# Patient Record
Sex: MALE | Race: WHITE | NOT HISPANIC OR LATINO | URBAN - METROPOLITAN AREA
[De-identification: names, ages, dates, MRNs, and addresses within clinical notes are randomized per-mention and may not be internally consistent; named-entity substitution may affect disease eponyms.]

---

## 2017-01-09 ENCOUNTER — EMERGENCY (EMERGENCY)
Facility: HOSPITAL | Age: 32
LOS: 1 days | End: 2017-01-09
Payer: SELF-PAY

## 2017-01-09 PROCEDURE — 99285 EMERGENCY DEPT VISIT HI MDM: CPT

## 2017-01-09 PROCEDURE — 74176 CT ABD & PELVIS W/O CONTRAST: CPT | Mod: 26

## 2017-05-26 ENCOUNTER — EMERGENCY (EMERGENCY)
Facility: HOSPITAL | Age: 32
LOS: 1 days | End: 2017-05-26
Payer: SELF-PAY

## 2017-05-26 PROCEDURE — 99284 EMERGENCY DEPT VISIT MOD MDM: CPT | Mod: 25

## 2017-05-26 PROCEDURE — 74176 CT ABD & PELVIS W/O CONTRAST: CPT | Mod: 26

## 2017-05-26 PROCEDURE — 99053 MED SERV 10PM-8AM 24 HR FAC: CPT

## 2019-10-21 ENCOUNTER — EMERGENCY (EMERGENCY)
Facility: HOSPITAL | Age: 34
LOS: 1 days | End: 2019-10-21
Admitting: EMERGENCY MEDICINE
Payer: COMMERCIAL

## 2019-10-21 PROCEDURE — 99284 EMERGENCY DEPT VISIT MOD MDM: CPT

## 2019-10-21 PROCEDURE — 74177 CT ABD & PELVIS W/CONTRAST: CPT | Mod: 26

## 2019-12-30 ENCOUNTER — OUTPATIENT (OUTPATIENT)
Dept: OUTPATIENT SERVICES | Facility: HOSPITAL | Age: 34
LOS: 1 days | End: 2019-12-30

## 2020-08-24 ENCOUNTER — INPATIENT (INPATIENT)
Facility: HOSPITAL | Age: 35
LOS: 0 days | Discharge: ANOTHER HOSPITAL | End: 2020-08-24
Attending: INTERNAL MEDICINE | Admitting: INTERNAL MEDICINE
Payer: COMMERCIAL

## 2020-08-24 ENCOUNTER — INPATIENT (INPATIENT)
Facility: HOSPITAL | Age: 35
LOS: 9 days | Discharge: HOME | DRG: 235 | End: 2020-09-03
Attending: THORACIC SURGERY (CARDIOTHORACIC VASCULAR SURGERY) | Admitting: THORACIC SURGERY (CARDIOTHORACIC VASCULAR SURGERY)
Payer: SELF-PAY

## 2020-08-24 VITALS
SYSTOLIC BLOOD PRESSURE: 174 MMHG | HEIGHT: 70 IN | DIASTOLIC BLOOD PRESSURE: 80 MMHG | RESPIRATION RATE: 15 BRPM | TEMPERATURE: 99 F | WEIGHT: 305.78 LBS | OXYGEN SATURATION: 94 % | HEART RATE: 97 BPM

## 2020-08-24 DIAGNOSIS — I25.10 ATHEROSCLEROTIC HEART DISEASE OF NATIVE CORONARY ARTERY WITHOUT ANGINA PECTORIS: ICD-10-CM

## 2020-08-24 LAB
A1C WITH ESTIMATED AVERAGE GLUCOSE RESULT: 5.7 % — HIGH (ref 4–5.6)
ALBUMIN SERPL ELPH-MCNC: 4.1 G/DL — SIGNIFICANT CHANGE UP (ref 3.3–5.2)
ALP SERPL-CCNC: 74 U/L — SIGNIFICANT CHANGE UP (ref 40–120)
ALT FLD-CCNC: 29 U/L — SIGNIFICANT CHANGE UP
ANION GAP SERPL CALC-SCNC: 16 MMOL/L — SIGNIFICANT CHANGE UP (ref 5–17)
APPEARANCE UR: CLEAR — SIGNIFICANT CHANGE UP
APTT BLD: 80.8 SEC — HIGH (ref 27.5–35.5)
AST SERPL-CCNC: 44 U/L — HIGH
BASOPHILS # BLD AUTO: 0.06 K/UL — SIGNIFICANT CHANGE UP (ref 0–0.2)
BASOPHILS NFR BLD AUTO: 0.4 % — SIGNIFICANT CHANGE UP (ref 0–2)
BILIRUB SERPL-MCNC: 0.6 MG/DL — SIGNIFICANT CHANGE UP (ref 0.4–2)
BILIRUB UR-MCNC: NEGATIVE — SIGNIFICANT CHANGE UP
BLD GP AB SCN SERPL QL: SIGNIFICANT CHANGE UP
BUN SERPL-MCNC: 12 MG/DL — SIGNIFICANT CHANGE UP (ref 8–20)
CALCIUM SERPL-MCNC: 9.2 MG/DL — SIGNIFICANT CHANGE UP (ref 8.6–10.2)
CHLORIDE SERPL-SCNC: 97 MMOL/L — LOW (ref 98–107)
CK SERPL-CCNC: 104 U/L — SIGNIFICANT CHANGE UP (ref 30–200)
CO2 SERPL-SCNC: 23 MMOL/L — SIGNIFICANT CHANGE UP (ref 22–29)
COLOR SPEC: YELLOW — SIGNIFICANT CHANGE UP
CREAT SERPL-MCNC: 0.96 MG/DL — SIGNIFICANT CHANGE UP (ref 0.5–1.3)
DIFF PNL FLD: NEGATIVE — SIGNIFICANT CHANGE UP
EOSINOPHIL # BLD AUTO: 0.24 K/UL — SIGNIFICANT CHANGE UP (ref 0–0.5)
EOSINOPHIL NFR BLD AUTO: 1.7 % — SIGNIFICANT CHANGE UP (ref 0–6)
EPI CELLS # UR: SIGNIFICANT CHANGE UP
ESTIMATED AVERAGE GLUCOSE: 117 MG/DL — HIGH (ref 68–114)
GLUCOSE SERPL-MCNC: 107 MG/DL — HIGH (ref 70–99)
GLUCOSE UR QL: NEGATIVE MG/DL — SIGNIFICANT CHANGE UP
HCT VFR BLD CALC: 44.5 % — SIGNIFICANT CHANGE UP (ref 39–50)
HGB BLD-MCNC: 14.5 G/DL — SIGNIFICANT CHANGE UP (ref 13–17)
IMM GRANULOCYTES NFR BLD AUTO: 2 % — HIGH (ref 0–1.5)
INR BLD: 1.23 RATIO — HIGH (ref 0.88–1.16)
KETONES UR-MCNC: ABNORMAL
LEUKOCYTE ESTERASE UR-ACNC: NEGATIVE — SIGNIFICANT CHANGE UP
LYMPHOCYTES # BLD AUTO: 14.1 % — SIGNIFICANT CHANGE UP (ref 13–44)
LYMPHOCYTES # BLD AUTO: 2.01 K/UL — SIGNIFICANT CHANGE UP (ref 1–3.3)
MCHC RBC-ENTMCNC: 27.4 PG — SIGNIFICANT CHANGE UP (ref 27–34)
MCHC RBC-ENTMCNC: 32.6 GM/DL — SIGNIFICANT CHANGE UP (ref 32–36)
MCV RBC AUTO: 84.1 FL — SIGNIFICANT CHANGE UP (ref 80–100)
MONOCYTES # BLD AUTO: 0.79 K/UL — SIGNIFICANT CHANGE UP (ref 0–0.9)
MONOCYTES NFR BLD AUTO: 5.6 % — SIGNIFICANT CHANGE UP (ref 2–14)
NEUTROPHILS # BLD AUTO: 10.83 K/UL — HIGH (ref 1.8–7.4)
NEUTROPHILS NFR BLD AUTO: 76.2 % — SIGNIFICANT CHANGE UP (ref 43–77)
NITRITE UR-MCNC: NEGATIVE — SIGNIFICANT CHANGE UP
NT-PROBNP SERPL-SCNC: 1384 PG/ML — HIGH (ref 0–300)
PA ADP PRP-ACNC: 15 PRU — LOW (ref 180–376)
PH UR: 6.5 — SIGNIFICANT CHANGE UP (ref 5–8)
PLATELET # BLD AUTO: 338 K/UL — SIGNIFICANT CHANGE UP (ref 150–400)
POTASSIUM SERPL-MCNC: 3.5 MMOL/L — SIGNIFICANT CHANGE UP (ref 3.5–5.3)
POTASSIUM SERPL-SCNC: 3.5 MMOL/L — SIGNIFICANT CHANGE UP (ref 3.5–5.3)
PROT SERPL-MCNC: 7.4 G/DL — SIGNIFICANT CHANGE UP (ref 6.6–8.7)
PROT UR-MCNC: 30 MG/DL
PROTHROM AB SERPL-ACNC: 14.1 SEC — HIGH (ref 10.6–13.6)
RBC # BLD: 5.29 M/UL — SIGNIFICANT CHANGE UP (ref 4.2–5.8)
RBC # FLD: 14.8 % — HIGH (ref 10.3–14.5)
RBC CASTS # UR COMP ASSIST: NEGATIVE /HPF — SIGNIFICANT CHANGE UP (ref 0–4)
SODIUM SERPL-SCNC: 136 MMOL/L — SIGNIFICANT CHANGE UP (ref 135–145)
SP GR SPEC: 1 — LOW (ref 1.01–1.02)
TSH SERPL-MCNC: 1.82 UIU/ML — SIGNIFICANT CHANGE UP (ref 0.27–4.2)
UROBILINOGEN FLD QL: NEGATIVE MG/DL — SIGNIFICANT CHANGE UP
WBC # BLD: 14.21 K/UL — HIGH (ref 3.8–10.5)
WBC # FLD AUTO: 14.21 K/UL — HIGH (ref 3.8–10.5)
WBC UR QL: NEGATIVE — SIGNIFICANT CHANGE UP

## 2020-08-24 PROCEDURE — 93010 ELECTROCARDIOGRAM REPORT: CPT | Mod: 76

## 2020-08-24 PROCEDURE — 93458 L HRT ARTERY/VENTRICLE ANGIO: CPT | Mod: 26

## 2020-08-24 PROCEDURE — 99223 1ST HOSP IP/OBS HIGH 75: CPT

## 2020-08-24 PROCEDURE — 93010 ELECTROCARDIOGRAM REPORT: CPT

## 2020-08-24 PROCEDURE — 93308 TTE F-UP OR LMTD: CPT | Mod: 26

## 2020-08-24 PROCEDURE — 71045 X-RAY EXAM CHEST 1 VIEW: CPT | Mod: 26

## 2020-08-24 PROCEDURE — 99291 CRITICAL CARE FIRST HOUR: CPT

## 2020-08-24 PROCEDURE — 33967 INSERT I-AORT PERCUT DEVICE: CPT

## 2020-08-24 PROCEDURE — 99255 IP/OBS CONSLTJ NEW/EST HI 80: CPT | Mod: 25

## 2020-08-24 PROCEDURE — 93880 EXTRACRANIAL BILAT STUDY: CPT | Mod: 26

## 2020-08-24 RX ORDER — ONDANSETRON 8 MG/1
4 TABLET, FILM COATED ORAL ONCE
Refills: 0 | Status: COMPLETED | OUTPATIENT
Start: 2020-08-24 | End: 2020-08-24

## 2020-08-24 RX ORDER — SODIUM CHLORIDE 9 MG/ML
3 INJECTION INTRAMUSCULAR; INTRAVENOUS; SUBCUTANEOUS EVERY 8 HOURS
Refills: 0 | Status: DISCONTINUED | OUTPATIENT
Start: 2020-08-24 | End: 2020-08-28

## 2020-08-24 RX ORDER — FUROSEMIDE 40 MG
40 TABLET ORAL ONCE
Refills: 0 | Status: COMPLETED | OUTPATIENT
Start: 2020-08-24 | End: 2020-08-24

## 2020-08-24 RX ORDER — HEPARIN SODIUM 5000 [USP'U]/ML
1000 INJECTION INTRAVENOUS; SUBCUTANEOUS
Qty: 25000 | Refills: 0 | Status: DISCONTINUED | OUTPATIENT
Start: 2020-08-24 | End: 2020-08-25

## 2020-08-24 RX ORDER — ASPIRIN/CALCIUM CARB/MAGNESIUM 324 MG
81 TABLET ORAL DAILY
Refills: 0 | Status: DISCONTINUED | OUTPATIENT
Start: 2020-08-24 | End: 2020-08-28

## 2020-08-24 RX ORDER — POTASSIUM CHLORIDE 20 MEQ
40 PACKET (EA) ORAL ONCE
Refills: 0 | Status: COMPLETED | OUTPATIENT
Start: 2020-08-24 | End: 2020-08-24

## 2020-08-24 RX ORDER — ATORVASTATIN CALCIUM 80 MG/1
40 TABLET, FILM COATED ORAL AT BEDTIME
Refills: 0 | Status: DISCONTINUED | OUTPATIENT
Start: 2020-08-24 | End: 2020-08-28

## 2020-08-24 RX ORDER — DOBUTAMINE HCL 250MG/20ML
2.5 VIAL (ML) INTRAVENOUS
Qty: 500 | Refills: 0 | Status: DISCONTINUED | OUTPATIENT
Start: 2020-08-24 | End: 2020-08-24

## 2020-08-24 RX ORDER — MORPHINE SULFATE 50 MG/1
2 CAPSULE, EXTENDED RELEASE ORAL EVERY 4 HOURS
Refills: 0 | Status: DISCONTINUED | OUTPATIENT
Start: 2020-08-24 | End: 2020-08-28

## 2020-08-24 RX ORDER — HYDRALAZINE HCL 50 MG
10 TABLET ORAL ONCE
Refills: 0 | Status: COMPLETED | OUTPATIENT
Start: 2020-08-24 | End: 2020-08-24

## 2020-08-24 RX ORDER — ACETAMINOPHEN 500 MG
1000 TABLET ORAL ONCE
Refills: 0 | Status: COMPLETED | OUTPATIENT
Start: 2020-08-24 | End: 2020-08-24

## 2020-08-24 RX ORDER — METOPROLOL TARTRATE 50 MG
25 TABLET ORAL
Refills: 0 | Status: DISCONTINUED | OUTPATIENT
Start: 2020-08-24 | End: 2020-08-25

## 2020-08-24 RX ADMIN — Medication 25 MILLIGRAM(S): at 23:04

## 2020-08-24 RX ADMIN — MORPHINE SULFATE 2 MILLIGRAM(S): 50 CAPSULE, EXTENDED RELEASE ORAL at 21:30

## 2020-08-24 RX ADMIN — SODIUM CHLORIDE 3 MILLILITER(S): 9 INJECTION INTRAMUSCULAR; INTRAVENOUS; SUBCUTANEOUS at 21:47

## 2020-08-24 RX ADMIN — Medication 400 MILLIGRAM(S): at 23:05

## 2020-08-24 RX ADMIN — ONDANSETRON 4 MILLIGRAM(S): 8 TABLET, FILM COATED ORAL at 21:45

## 2020-08-24 RX ADMIN — Medication 40 MILLIGRAM(S): at 23:04

## 2020-08-24 RX ADMIN — HEPARIN SODIUM 9.5 UNIT(S)/HR: 5000 INJECTION INTRAVENOUS; SUBCUTANEOUS at 22:45

## 2020-08-24 RX ADMIN — MORPHINE SULFATE 2 MILLIGRAM(S): 50 CAPSULE, EXTENDED RELEASE ORAL at 20:45

## 2020-08-24 RX ADMIN — Medication 40 MILLIEQUIVALENT(S): at 23:07

## 2020-08-24 RX ADMIN — Medication 10 MILLIGRAM(S): at 21:45

## 2020-08-25 DIAGNOSIS — E66.9 OBESITY, UNSPECIFIED: ICD-10-CM

## 2020-08-25 DIAGNOSIS — K46.9 UNSPECIFIED ABDOMINAL HERNIA WITHOUT OBSTRUCTION OR GANGRENE: Chronic | ICD-10-CM

## 2020-08-25 DIAGNOSIS — Z87.891 PERSONAL HISTORY OF NICOTINE DEPENDENCE: ICD-10-CM

## 2020-08-25 DIAGNOSIS — K21.9 GASTRO-ESOPHAGEAL REFLUX DISEASE WITHOUT ESOPHAGITIS: ICD-10-CM

## 2020-08-25 DIAGNOSIS — I50.21 ACUTE SYSTOLIC (CONGESTIVE) HEART FAILURE: ICD-10-CM

## 2020-08-25 DIAGNOSIS — Z29.9 ENCOUNTER FOR PROPHYLACTIC MEASURES, UNSPECIFIED: ICD-10-CM

## 2020-08-25 LAB
ABO RH CONFIRMATION: SIGNIFICANT CHANGE UP
ALBUMIN SERPL ELPH-MCNC: 3.9 G/DL — SIGNIFICANT CHANGE UP (ref 3.3–5.2)
ALBUMIN SERPL ELPH-MCNC: 4.1 G/DL — SIGNIFICANT CHANGE UP (ref 3.3–5.2)
ALP SERPL-CCNC: 64 U/L — SIGNIFICANT CHANGE UP (ref 40–120)
ALP SERPL-CCNC: 66 U/L — SIGNIFICANT CHANGE UP (ref 40–120)
ALT FLD-CCNC: 24 U/L — SIGNIFICANT CHANGE UP
ALT FLD-CCNC: 28 U/L — SIGNIFICANT CHANGE UP
ANION GAP SERPL CALC-SCNC: 17 MMOL/L — SIGNIFICANT CHANGE UP (ref 5–17)
ANION GAP SERPL CALC-SCNC: SIGNIFICANT CHANGE UP MMOL/L (ref 5–17)
APTT BLD: 38.2 SEC — HIGH (ref 27.5–35.5)
APTT BLD: 38.2 SEC — HIGH (ref 27.5–35.5)
APTT BLD: 43.8 SEC — HIGH (ref 27.5–35.5)
AST SERPL-CCNC: 27 U/L — SIGNIFICANT CHANGE UP
AST SERPL-CCNC: 34 U/L — SIGNIFICANT CHANGE UP
BASOPHILS # BLD AUTO: 0.06 K/UL — SIGNIFICANT CHANGE UP (ref 0–0.2)
BASOPHILS NFR BLD AUTO: 0.5 % — SIGNIFICANT CHANGE UP (ref 0–2)
BILIRUB SERPL-MCNC: 0.4 MG/DL — SIGNIFICANT CHANGE UP (ref 0.4–2)
BILIRUB SERPL-MCNC: 0.6 MG/DL — SIGNIFICANT CHANGE UP (ref 0.4–2)
BUN SERPL-MCNC: 16 MG/DL — SIGNIFICANT CHANGE UP (ref 8–20)
BUN SERPL-MCNC: 20 MG/DL — SIGNIFICANT CHANGE UP (ref 8–20)
CALCIUM SERPL-MCNC: 9.1 MG/DL — SIGNIFICANT CHANGE UP (ref 8.6–10.2)
CALCIUM SERPL-MCNC: 9.2 MG/DL — SIGNIFICANT CHANGE UP (ref 8.6–10.2)
CHLORIDE SERPL-SCNC: 96 MMOL/L — LOW (ref 98–107)
CHLORIDE SERPL-SCNC: 99 MMOL/L — SIGNIFICANT CHANGE UP (ref 98–107)
CO2 SERPL-SCNC: 24 MMOL/L — SIGNIFICANT CHANGE UP (ref 22–29)
CO2 SERPL-SCNC: 24 MMOL/L — SIGNIFICANT CHANGE UP (ref 22–29)
CREAT SERPL-MCNC: 0.93 MG/DL — SIGNIFICANT CHANGE UP (ref 0.5–1.3)
CREAT SERPL-MCNC: 0.95 MG/DL — SIGNIFICANT CHANGE UP (ref 0.5–1.3)
EOSINOPHIL # BLD AUTO: 0.14 K/UL — SIGNIFICANT CHANGE UP (ref 0–0.5)
EOSINOPHIL NFR BLD AUTO: 1.1 % — SIGNIFICANT CHANGE UP (ref 0–6)
GLUCOSE SERPL-MCNC: 116 MG/DL — HIGH (ref 70–99)
GLUCOSE SERPL-MCNC: 118 MG/DL — HIGH (ref 70–99)
HCT VFR BLD CALC: 42.5 % — SIGNIFICANT CHANGE UP (ref 39–50)
HGB BLD-MCNC: 13.5 G/DL — SIGNIFICANT CHANGE UP (ref 13–17)
IMM GRANULOCYTES NFR BLD AUTO: 1.8 % — HIGH (ref 0–1.5)
LYMPHOCYTES # BLD AUTO: 1.76 K/UL — SIGNIFICANT CHANGE UP (ref 1–3.3)
LYMPHOCYTES # BLD AUTO: 14.3 % — SIGNIFICANT CHANGE UP (ref 13–44)
MAGNESIUM SERPL-MCNC: 2.1 MG/DL — SIGNIFICANT CHANGE UP (ref 1.6–2.6)
MCHC RBC-ENTMCNC: 27.3 PG — SIGNIFICANT CHANGE UP (ref 27–34)
MCHC RBC-ENTMCNC: 31.8 GM/DL — LOW (ref 32–36)
MCV RBC AUTO: 85.9 FL — SIGNIFICANT CHANGE UP (ref 80–100)
MONOCYTES # BLD AUTO: 0.8 K/UL — SIGNIFICANT CHANGE UP (ref 0–0.9)
MONOCYTES NFR BLD AUTO: 6.5 % — SIGNIFICANT CHANGE UP (ref 2–14)
MRSA PCR RESULT.: SIGNIFICANT CHANGE UP
NEUTROPHILS # BLD AUTO: 9.31 K/UL — HIGH (ref 1.8–7.4)
NEUTROPHILS NFR BLD AUTO: 75.8 % — SIGNIFICANT CHANGE UP (ref 43–77)
PLATELET # BLD AUTO: 342 K/UL — SIGNIFICANT CHANGE UP (ref 150–400)
POTASSIUM SERPL-MCNC: 4.2 MMOL/L — SIGNIFICANT CHANGE UP (ref 3.5–5.3)
POTASSIUM SERPL-MCNC: 4.2 MMOL/L — SIGNIFICANT CHANGE UP (ref 3.5–5.3)
POTASSIUM SERPL-SCNC: 4.2 MMOL/L — SIGNIFICANT CHANGE UP (ref 3.5–5.3)
POTASSIUM SERPL-SCNC: 4.2 MMOL/L — SIGNIFICANT CHANGE UP (ref 3.5–5.3)
PROT SERPL-MCNC: 7.1 G/DL — SIGNIFICANT CHANGE UP (ref 6.6–8.7)
PROT SERPL-MCNC: 7.4 G/DL — SIGNIFICANT CHANGE UP (ref 6.6–8.7)
RBC # BLD: 4.95 M/UL — SIGNIFICANT CHANGE UP (ref 4.2–5.8)
RBC # FLD: 15.1 % — HIGH (ref 10.3–14.5)
S AUREUS DNA NOSE QL NAA+PROBE: SIGNIFICANT CHANGE UP
SARS-COV-2 IGG SERPL QL IA: NEGATIVE — SIGNIFICANT CHANGE UP
SARS-COV-2 IGM SERPL IA-ACNC: 0.12 INDEX — SIGNIFICANT CHANGE UP
SODIUM SERPL-SCNC: 134 MMOL/L — LOW (ref 135–145)
SODIUM SERPL-SCNC: 137 MMOL/L — SIGNIFICANT CHANGE UP (ref 135–145)
T4 FREE SERPL-MCNC: 1.3 NG/DL — SIGNIFICANT CHANGE UP (ref 0.9–1.8)
TROPONIN T SERPL-MCNC: 1.65 NG/ML — HIGH (ref 0–0.06)
WBC # BLD: 12.29 K/UL — HIGH (ref 3.8–10.5)
WBC # FLD AUTO: 12.29 K/UL — HIGH (ref 3.8–10.5)

## 2020-08-25 PROCEDURE — 93010 ELECTROCARDIOGRAM REPORT: CPT | Mod: 76

## 2020-08-25 PROCEDURE — 93306 TTE W/DOPPLER COMPLETE: CPT | Mod: 26

## 2020-08-25 PROCEDURE — 71045 X-RAY EXAM CHEST 1 VIEW: CPT | Mod: 26,76

## 2020-08-25 PROCEDURE — 99222 1ST HOSP IP/OBS MODERATE 55: CPT

## 2020-08-25 RX ORDER — ACETAMINOPHEN 500 MG
1000 TABLET ORAL ONCE
Refills: 0 | Status: COMPLETED | OUTPATIENT
Start: 2020-08-25 | End: 2020-08-25

## 2020-08-25 RX ORDER — HEPARIN SODIUM 5000 [USP'U]/ML
1400 INJECTION INTRAVENOUS; SUBCUTANEOUS
Qty: 25000 | Refills: 0 | Status: DISCONTINUED | OUTPATIENT
Start: 2020-08-25 | End: 2020-08-26

## 2020-08-25 RX ORDER — METOPROLOL TARTRATE 50 MG
50 TABLET ORAL EVERY 12 HOURS
Refills: 0 | Status: DISCONTINUED | OUTPATIENT
Start: 2020-08-25 | End: 2020-08-26

## 2020-08-25 RX ORDER — METOPROLOL TARTRATE 50 MG
25 TABLET ORAL
Refills: 0 | Status: DISCONTINUED | OUTPATIENT
Start: 2020-08-25 | End: 2020-08-25

## 2020-08-25 RX ORDER — ALPRAZOLAM 0.25 MG
0.25 TABLET ORAL ONCE
Refills: 0 | Status: DISCONTINUED | OUTPATIENT
Start: 2020-08-25 | End: 2020-08-25

## 2020-08-25 RX ORDER — POTASSIUM CHLORIDE 20 MEQ
40 PACKET (EA) ORAL ONCE
Refills: 0 | Status: COMPLETED | OUTPATIENT
Start: 2020-08-25 | End: 2020-08-25

## 2020-08-25 RX ORDER — CHLORHEXIDINE GLUCONATE 213 G/1000ML
1 SOLUTION TOPICAL ONCE
Refills: 0 | Status: COMPLETED | OUTPATIENT
Start: 2020-08-25 | End: 2020-08-25

## 2020-08-25 RX ORDER — NITROGLYCERIN 6.5 MG
0.4 CAPSULE, EXTENDED RELEASE ORAL ONCE
Refills: 0 | Status: COMPLETED | OUTPATIENT
Start: 2020-08-25 | End: 2020-08-25

## 2020-08-25 RX ORDER — CEFUROXIME AXETIL 250 MG
1500 TABLET ORAL ONCE
Refills: 0 | Status: DISCONTINUED | OUTPATIENT
Start: 2020-08-25 | End: 2020-08-28

## 2020-08-25 RX ORDER — VANCOMYCIN HCL 1 G
1500 VIAL (EA) INTRAVENOUS ONCE
Refills: 0 | Status: DISCONTINUED | OUTPATIENT
Start: 2020-08-25 | End: 2020-08-28

## 2020-08-25 RX ORDER — HEPARIN SODIUM 5000 [USP'U]/ML
1200 INJECTION INTRAVENOUS; SUBCUTANEOUS
Qty: 25000 | Refills: 0 | Status: DISCONTINUED | OUTPATIENT
Start: 2020-08-25 | End: 2020-08-25

## 2020-08-25 RX ORDER — METOPROLOL TARTRATE 50 MG
25 TABLET ORAL ONCE
Refills: 0 | Status: COMPLETED | OUTPATIENT
Start: 2020-08-25 | End: 2020-08-25

## 2020-08-25 RX ORDER — METOPROLOL TARTRATE 50 MG
5 TABLET ORAL ONCE
Refills: 0 | Status: COMPLETED | OUTPATIENT
Start: 2020-08-25 | End: 2020-08-25

## 2020-08-25 RX ORDER — ALPRAZOLAM 0.25 MG
0.25 TABLET ORAL EVERY 8 HOURS
Refills: 0 | Status: DISCONTINUED | OUTPATIENT
Start: 2020-08-25 | End: 2020-08-27

## 2020-08-25 RX ORDER — METOPROLOL TARTRATE 50 MG
25 TABLET ORAL THREE TIMES A DAY
Refills: 0 | Status: DISCONTINUED | OUTPATIENT
Start: 2020-08-25 | End: 2020-08-25

## 2020-08-25 RX ORDER — NITROGLYCERIN 6.5 MG
5 CAPSULE, EXTENDED RELEASE ORAL
Qty: 50 | Refills: 0 | Status: DISCONTINUED | OUTPATIENT
Start: 2020-08-25 | End: 2020-08-27

## 2020-08-25 RX ADMIN — SODIUM CHLORIDE 3 MILLILITER(S): 9 INJECTION INTRAMUSCULAR; INTRAVENOUS; SUBCUTANEOUS at 21:41

## 2020-08-25 RX ADMIN — Medication 25 MILLIGRAM(S): at 05:04

## 2020-08-25 RX ADMIN — CHLORHEXIDINE GLUCONATE 1 APPLICATION(S): 213 SOLUTION TOPICAL at 22:05

## 2020-08-25 RX ADMIN — Medication 1000 MILLIGRAM(S): at 15:30

## 2020-08-25 RX ADMIN — SODIUM CHLORIDE 3 MILLILITER(S): 9 INJECTION INTRAMUSCULAR; INTRAVENOUS; SUBCUTANEOUS at 05:05

## 2020-08-25 RX ADMIN — Medication 0.4 MILLIGRAM(S): at 21:39

## 2020-08-25 RX ADMIN — MORPHINE SULFATE 2 MILLIGRAM(S): 50 CAPSULE, EXTENDED RELEASE ORAL at 18:43

## 2020-08-25 RX ADMIN — Medication 0.25 MILLIGRAM(S): at 21:30

## 2020-08-25 RX ADMIN — MORPHINE SULFATE 2 MILLIGRAM(S): 50 CAPSULE, EXTENDED RELEASE ORAL at 13:22

## 2020-08-25 RX ADMIN — Medication 5 MILLIGRAM(S): at 09:31

## 2020-08-25 RX ADMIN — Medication 1000 MILLIGRAM(S): at 00:00

## 2020-08-25 RX ADMIN — Medication 50 MILLIGRAM(S): at 17:08

## 2020-08-25 RX ADMIN — Medication 400 MILLIGRAM(S): at 08:00

## 2020-08-25 RX ADMIN — Medication 400 MILLIGRAM(S): at 15:05

## 2020-08-25 RX ADMIN — Medication 0.25 MILLIGRAM(S): at 02:33

## 2020-08-25 RX ADMIN — MORPHINE SULFATE 2 MILLIGRAM(S): 50 CAPSULE, EXTENDED RELEASE ORAL at 13:07

## 2020-08-25 RX ADMIN — Medication 0.4 MILLIGRAM(S): at 06:15

## 2020-08-25 RX ADMIN — Medication 40 MILLIEQUIVALENT(S): at 05:04

## 2020-08-25 RX ADMIN — Medication 81 MILLIGRAM(S): at 12:31

## 2020-08-25 RX ADMIN — ATORVASTATIN CALCIUM 40 MILLIGRAM(S): 80 TABLET, FILM COATED ORAL at 21:39

## 2020-08-25 RX ADMIN — MORPHINE SULFATE 2 MILLIGRAM(S): 50 CAPSULE, EXTENDED RELEASE ORAL at 05:37

## 2020-08-25 RX ADMIN — MORPHINE SULFATE 2 MILLIGRAM(S): 50 CAPSULE, EXTENDED RELEASE ORAL at 18:28

## 2020-08-25 RX ADMIN — HEPARIN SODIUM 14 UNIT(S)/HR: 5000 INJECTION INTRAVENOUS; SUBCUTANEOUS at 21:00

## 2020-08-25 RX ADMIN — Medication 25 MILLIGRAM(S): at 12:31

## 2020-08-25 RX ADMIN — SODIUM CHLORIDE 3 MILLILITER(S): 9 INJECTION INTRAMUSCULAR; INTRAVENOUS; SUBCUTANEOUS at 14:18

## 2020-08-25 RX ADMIN — Medication 1000 MILLIGRAM(S): at 08:30

## 2020-08-25 NOTE — H&P ADULT - ASSESSMENT
35 year old male, PMH of GERD, HTN, active smoker for 12 years (1/2 PPD), family history of MI (father at 34yo), [place] [facility] [place] presenting with shortness of breath for two days with frothy sputum. Patient had chest pain, with arm pain on his left. CXR showed what looked like pulmonary edema. Patient was given lopressor, lasix, and was started on nitro gtt. ST depressions noted on EKG. Patient taken to the Cath lab and was found to have EF of 30-40%, proximal LAD disease, ostial LAD, and proximal Circ. Patient had a right groin balloon pump inserted. Patient was brought to [place] for further management.

## 2020-08-25 NOTE — CONSULT NOTE ADULT - ASSESSMENT
A/P:  36yo male w/PMH of GERD, HTN (untreated), obesity (gained 80-100lbs in 1 year), ?ELSA, ?herniated disc (right lateral mid thigh numbness), anxiety, active smoker for 15 years (1/2-1 PPD), strong family history of early CAD and MI (both sides, father at 34yo MI & CABG). [place] [facility] [place] and IABP placement.  Patient has been experiencing increased TRINH for about 1-2wks. Saturday night noticed he was having difficulty laying flat d/t orthopnea and PND.  Symptoms worsened Sunday night into Monday morning. Then began getting sharp left chest pain w/left arm numbness/tingling into fingertips, nausea, and hot flashes and chills.  [place] [facility] [place] 0.87. EKG evolving to ST depressions inferolaterally. Pulm edema on CXR. Taken to Cath, found to have EF of 30-40%, proximal LAD disease, ostial LAD, and proximal Circ. Had a right groin IABP placed p/t transfer.       NSTEMI  -EKG w/inferolateral T wave inversions  -[place]rop 0.87 [place] [facility]  -BnP 1384  -LHC [place] revealed EF of 30-40%, proximal LAD disease, ostial LAD, and proximal Circ  -Plan for CTSx intervention  -wean off IABP as allowed  -c/w nitro gtt, heparin gtt, asa, statin, lopressor    HfrEF  -euvolemic on exam  -bedside TTE pending results    Hypertension  -BP normalizes when anxiety and back pain are controlled  -c/w current med regimen    Lifestyle  -patient confident in smoking cessation and diet/lifestyle modifications.      Preliminary evaluation, please await complete evaluation by Dr. Mckee A/P:  36yo male w/PMH of GERD, HTN (untreated), obesity (gained 80-100lbs in 1 year), ?ELSA, ?herniated disc (right lateral mid thigh numbness), anxiety, active smoker for 15 years (1/2-1 PPD), strong family history of early CAD and MI (both sides, father at 34yo MI & CABG). [place] [facility] [place] and IABP placement.  Patient has been experiencing increased TRINH for about 1-2wks. Saturday night noticed he was having difficulty laying flat d/t orthopnea and PND.  Symptoms worsened Sunday night into Monday morning. Then began getting sharp left chest pain w/left arm numbness/tingling into fingertips, nausea, and hot flashes and chills.  [place] [facility] [place]rop 0.87. EKG evolving to ST depressions inferolaterally. Pulm edema on CXR. Taken to Cath, found to have EF of 30-40%, proximal LAD disease, ostial LAD, and proximal Circ. Had a right groin IABP placed p/t transfer.       NSTEMI  -EKG w/inferolateral T wave inversions  -Trop 0.87 at [facility] [place]  -BnP 1384  -LHC ([facility] [place] revealed EF of 30-40%, proximal LAD disease, ostial LAD, and proximal Circ  -Plan for CTSx intervention  -wean off IABP as allowed  -c/w nitro gtt, heparin gtt, asa, statin, lopressor    HfrEF  -euvolemic on exam  -bedside TTE pending results    Hypertension  -BP normalizes when anxiety and back pain are controlled  -c/w current med regimen    Lifestyle  -patient confident in smoking cessation and diet/lifestyle modifications.

## 2020-08-25 NOTE — H&P ADULT - NSHPLABSRESULTS_GEN_ALL_CORE
Labs                          14.5   14.21 )-----------( 338      ( 24 Aug 2020 21:17 )             44.5     08-24    136  |  97<L>  |  12.0  ----------------------------<  107<H>  3.5   |  23.0  |  0.96    Ca    9.2      24 Aug 2020 21:17    TPro  7.4  /  Alb  4.1  /  TBili  0.6  /  DBili  x   /  AST  44<H>  /  ALT  29  /  AlkPhos  74  08-24    Radiology     pending

## 2020-08-25 NOTE — CONSULT NOTE ADULT - SUBJECTIVE AND OBJECTIVE BOX
[place] [facility]                                                               Office:  [address] [address]                                                              Telephone: [phone]. Fax:[phone]                                                                        CARDIOLOGY CONSULTATION NOTE                                                                                             Consult requested by:  Dr. Miller  Reason for Consultation: NSTEMI, Hypertensive  History obtained by: Patient and medical record   obtained: No    Chief complaint:    Patient is a 35y old  Male who presents with a chief complaint of chest pain and shortness of breath (25 Aug 2020 00:01)        HPI:  36yo male w/PMH of GERD, HTN (untreated), obesity (gained 80-100lbs in 1 year), ?ELSA, ?herniated disc (right lateral mid thigh numbness), anxiety, active smoker for 15 years (1/2-1 PPD), strong family history of early CAD and MI (both sides, father at 34yo MI & CABG). Patient [place] from [facility] [place] s/p NSTEMI and IABP placement.  Patient has been experiencing increased TRINH for about 1-2wks. Saturday night noticed he was having difficulty laying flat d/t orthopnea and PND.  Symptoms worsened  night into Monday morning. Then began getting sharp left chest pain w/left arm numbness/tingling into fingertips, nausea, and hot flashes and chills.  At [facility] [place] Trop 0.87. EKG evolving to ST depressions inferolaterally. Pulm edema on CXR. Taken to Cath, found to have EF of 30-40%, proximal LAD disease, ostial LAD, and proximal Circ. Had a right groin IABP placed p/t transfer. Denies palpitations, irregular and/or rapid heart beat, syncope/near syncope, dizziness, edema, fever, v/d, hematuria, or hematochezia.         REVIEW OF SYMPTOMS:     CONSTITUTIONAL: No fever, weight loss, or fatigue  ENMT:  No difficulty hearing, tinnitus, vertigo; No sinus or throat pain  NECK: No pain or stiffness  CARDIOVASCULAR: as per HPI  RESPIRATORY: as per HPI   GI: No dark color stool, no melena, no diarrhea, no constipation, no abdominal pain   NEURO: No headache, no dizziness, no slurred speech   MUSCULOSKELETAL: No joint pain or swelling; No muscle, back, or extremity pain  PSYCH: No agitation, no anxiety.    ALL OTHER REVIEW OF SYSTEMS ARE NEGATIVE.      PREVIOUS DIAGNOSTIC TESTING  in chart from [facility] [place]:  EF of 30-40%, proximal LAD disease, ostial LAD, and proximal Circ.      ALLERGIES: Allergies    No Known Allergies    Intolerances          PAST MEDICAL HISTORY  History of ear surgery      PAST SURGICAL HISTORY  Abdominal hernia      FAMILY HISTORY:  family history of early CAD and MI (both sides, father at 34yo MI & CABG)    SOCIAL HISTORY:    CIGARETTES:   active smoker for 15 years (1/2-1 PPD)  ALCOHOL: drinks 6-10 lrg beers or glasses of vodka per weekend,  DRUGS: marijuana      CURRENT MEDICATIONS:  metoprolol tartrate 25 milliGRAM(s) Oral three times a day  nitroglycerin  Infusion 5 MICROgram(s)/Min IV Continuous <Continuous>     aspirin enteric coated  atorvastatin  cefuroxime  IVPB  chlorhexidine 4% Liquid  heparin  Infusion  sodium chloride 0.9% lock flush  vancomycin  IVPB        HOME MEDICATIONS:  Omeprazole 40mg Qam  Benadryl PRN for anxiety    Vital Signs Last 24 Hrs  T(C): 37.3 (25 Aug 2020 08:00), Max: 37.4 (24 Aug 2020 20:30)  T(F): 99.1 (25 Aug 2020 08:00), Max: 99.3 (24 Aug 2020 20:30)  HR: 80 (25 Aug 2020 09:00) (75 - 97)  BP: 143/82 (25 Aug 2020 09:00) (143/82 - 179/78)  BP(mean): 107 (25 Aug 2020 09:00) (92 - 127)  RR: 22 (25 Aug 2020 09:00) (12 - 29)  SpO2: 96% (25 Aug 2020 09:00) (91% - 98%)      PHYSICAL EXAM:  Constitutional: Comfortable. No acute distress.   HEENT: Atraumatic and normocephalic, neck is supple. No JVD. No carotid bruit. PEERL   CNS: A&Ox3. No focal deficits. EOMI. Cranial nerves II-IX are intact.   Lymph Nodes: Cervical: Not palpable.  Respiratory: LSCTA  Cardiovascular: S1S2 RRR. II/VI murmur. NO rubs or gallop.  Gastrointestinal: Soft non-tender and non distended. +Bowel sounds. Negative Mir's sign.  Extremities: No edema.   Psychiatric: Calm. No agitation.  Skin: No skin rash/ulcers visualized to face, hands or feet.    Intake and output:    @ 07:01  -  08-25 @ 07:00  --------------------------------------------------------  IN: 1116 mL / OUT: 855 mL / NET: 261 mL     @ 07:01   @ 10:30  --------------------------------------------------------  IN: 0 mL / OUT: 50 mL / NET: -50 mL        LABS:                        13.5   12.29 )-----------( 342      ( 25 Aug 2020 05:54 )             42.5         137  |  96<L>  |  16.0  ----------------------------<  118<H>  4.2   |  24.0  |  0.95    Ca    9.2      25 Aug 2020 05:54    TPro  7.4  /  Alb  4.1  /  TBili  0.6  /  DBili  x   /  AST  34  /  ALT  28  /  AlkPhos  66      CARDIAC MARKERS ( 24 Aug 2020 21:17 )  x     / x     / 104 U/L / x     / x        ;p-BNP=Serum Pro-Brain Natriuretic Peptide: 1384 pg/mL ( @ 21:17)    PT/INR - ( 24 Aug 2020 21:17 )   PT: 14.1 sec;   INR: 1.23 ratio         PTT - ( 25 Aug 2020 05:53 )  PTT:43.8 sec  Urinalysis Basic - ( 24 Aug 2020 21:17 )    Color: Yellow / Appearance: Clear / S.005 / pH: x  Gluc: x / Ketone: Moderate  / Bili: Negative / Urobili: Negative mg/dL   Blood: x / Protein: 30 mg/dL / Nitrite: Negative   Leuk Esterase: Negative / RBC: Negative /HPF / WBC Negative   Sq Epi: x / Non Sq Epi: Occasional / Bacteria: x        INTERPRETATION OF TELEMETRY: SR 78, inverted T waves lead II  ECG: Sinus rhythm, inferolateral T wave inversions    RADIOLOGY & ADDITIONAL STUDIES:    X-ray:   < from: Xray Chest 1 View AP/PA. (20 @ 05:41) >  FINDINGS: Tip of aortic balloon pump just below the aortic knob..  The lungs are clear of airspace consolidations or effusions. No pneumothorax.    Heart size within normal limits allowing for magnification effect of AP projection. Visualized osseous structures are intact.        IMPRESSION:   Aortic balloon pump tip just below aortic knob..    < end of copied text >      CT scan:   MRI:

## 2020-08-25 NOTE — H&P ADULT - HISTORY OF PRESENT ILLNESS
35 year old male, PMH of GERD, HTN, active smoker for 12 years (1/2 PPD), family history of MI (father at 34yo), [place] [facility] [place] with shortness of breath for two days with frothy sputum. Patient had chest pain, with arm pain on his left. CXR showed what looked like pulmonary edema. Patient was given lopressor, lasix, and was started on nitro gtt. ST depressions noted on EKG. Patient taken to the Cath lab and was found to have EF of 30-40%, proximal LAD disease, ostial LAD, and proximal Circ. Patient had a right groin balloon pump inserted. Patient was brought to [place] for further management.

## 2020-08-26 DIAGNOSIS — I10 ESSENTIAL (PRIMARY) HYPERTENSION: ICD-10-CM

## 2020-08-26 DIAGNOSIS — I25.10 ATHEROSCLEROTIC HEART DISEASE OF NATIVE CORONARY ARTERY WITHOUT ANGINA PECTORIS: ICD-10-CM

## 2020-08-26 LAB
ANION GAP SERPL CALC-SCNC: 14 MMOL/L — SIGNIFICANT CHANGE UP (ref 5–17)
BUN SERPL-MCNC: 19 MG/DL — SIGNIFICANT CHANGE UP (ref 8–20)
CALCIUM SERPL-MCNC: 9.1 MG/DL — SIGNIFICANT CHANGE UP (ref 8.6–10.2)
CHLORIDE SERPL-SCNC: 97 MMOL/L — LOW (ref 98–107)
CO2 SERPL-SCNC: 23 MMOL/L — SIGNIFICANT CHANGE UP (ref 22–29)
CREAT SERPL-MCNC: 0.94 MG/DL — SIGNIFICANT CHANGE UP (ref 0.5–1.3)
GAS PNL BLDA: SIGNIFICANT CHANGE UP
GLUCOSE SERPL-MCNC: 126 MG/DL — HIGH (ref 70–99)
HCT VFR BLD CALC: 39.6 % — SIGNIFICANT CHANGE UP (ref 39–50)
HGB BLD-MCNC: 12.6 G/DL — LOW (ref 13–17)
MAGNESIUM SERPL-MCNC: 2 MG/DL — SIGNIFICANT CHANGE UP (ref 1.6–2.6)
MCHC RBC-ENTMCNC: 27.2 PG — SIGNIFICANT CHANGE UP (ref 27–34)
MCHC RBC-ENTMCNC: 31.8 GM/DL — LOW (ref 32–36)
MCV RBC AUTO: 85.5 FL — SIGNIFICANT CHANGE UP (ref 80–100)
PA ADP PRP-ACNC: 118 PRU — LOW (ref 180–376)
PLATELET # BLD AUTO: 309 K/UL — SIGNIFICANT CHANGE UP (ref 150–400)
POTASSIUM SERPL-MCNC: 4.2 MMOL/L — SIGNIFICANT CHANGE UP (ref 3.5–5.3)
POTASSIUM SERPL-SCNC: 4.2 MMOL/L — SIGNIFICANT CHANGE UP (ref 3.5–5.3)
RBC # BLD: 4.63 M/UL — SIGNIFICANT CHANGE UP (ref 4.2–5.8)
RBC # FLD: 14.8 % — HIGH (ref 10.3–14.5)
SODIUM SERPL-SCNC: 134 MMOL/L — LOW (ref 135–145)
WBC # BLD: 12.54 K/UL — HIGH (ref 3.8–10.5)
WBC # FLD AUTO: 12.54 K/UL — HIGH (ref 3.8–10.5)

## 2020-08-26 PROCEDURE — 99232 SBSQ HOSP IP/OBS MODERATE 35: CPT

## 2020-08-26 PROCEDURE — 99291 CRITICAL CARE FIRST HOUR: CPT

## 2020-08-26 PROCEDURE — 71045 X-RAY EXAM CHEST 1 VIEW: CPT | Mod: 26

## 2020-08-26 RX ORDER — HYDRALAZINE HCL 50 MG
10 TABLET ORAL ONCE
Refills: 0 | Status: COMPLETED | OUTPATIENT
Start: 2020-08-26 | End: 2020-08-26

## 2020-08-26 RX ORDER — FENTANYL CITRATE 50 UG/ML
25 INJECTION INTRAVENOUS ONCE
Refills: 0 | Status: DISCONTINUED | OUTPATIENT
Start: 2020-08-26 | End: 2020-08-26

## 2020-08-26 RX ORDER — NITROGLYCERIN 6.5 MG
0.4 CAPSULE, EXTENDED RELEASE ORAL
Refills: 0 | Status: DISCONTINUED | OUTPATIENT
Start: 2020-08-26 | End: 2020-08-28

## 2020-08-26 RX ORDER — ACETAMINOPHEN 500 MG
1000 TABLET ORAL ONCE
Refills: 0 | Status: COMPLETED | OUTPATIENT
Start: 2020-08-26 | End: 2020-08-26

## 2020-08-26 RX ORDER — METOPROLOL TARTRATE 50 MG
50 TABLET ORAL THREE TIMES A DAY
Refills: 0 | Status: DISCONTINUED | OUTPATIENT
Start: 2020-08-26 | End: 2020-08-27

## 2020-08-26 RX ORDER — AMLODIPINE BESYLATE 2.5 MG/1
2.5 TABLET ORAL DAILY
Refills: 0 | Status: DISCONTINUED | OUTPATIENT
Start: 2020-08-26 | End: 2020-08-28

## 2020-08-26 RX ORDER — ACETAMINOPHEN 500 MG
650 TABLET ORAL ONCE
Refills: 0 | Status: COMPLETED | OUTPATIENT
Start: 2020-08-26 | End: 2020-08-26

## 2020-08-26 RX ADMIN — Medication 0.4 MILLIGRAM(S): at 21:50

## 2020-08-26 RX ADMIN — Medication 81 MILLIGRAM(S): at 11:58

## 2020-08-26 RX ADMIN — MORPHINE SULFATE 2 MILLIGRAM(S): 50 CAPSULE, EXTENDED RELEASE ORAL at 01:10

## 2020-08-26 RX ADMIN — Medication 10 MILLIGRAM(S): at 01:44

## 2020-08-26 RX ADMIN — MORPHINE SULFATE 2 MILLIGRAM(S): 50 CAPSULE, EXTENDED RELEASE ORAL at 15:50

## 2020-08-26 RX ADMIN — Medication 50 MILLIGRAM(S): at 21:33

## 2020-08-26 RX ADMIN — FENTANYL CITRATE 25 MICROGRAM(S): 50 INJECTION INTRAVENOUS at 08:10

## 2020-08-26 RX ADMIN — Medication 400 MILLIGRAM(S): at 04:58

## 2020-08-26 RX ADMIN — Medication 1000 MILLIGRAM(S): at 05:28

## 2020-08-26 RX ADMIN — Medication 50 MILLIGRAM(S): at 14:40

## 2020-08-26 RX ADMIN — SODIUM CHLORIDE 3 MILLILITER(S): 9 INJECTION INTRAMUSCULAR; INTRAVENOUS; SUBCUTANEOUS at 04:59

## 2020-08-26 RX ADMIN — MORPHINE SULFATE 2 MILLIGRAM(S): 50 CAPSULE, EXTENDED RELEASE ORAL at 21:40

## 2020-08-26 RX ADMIN — Medication 0.25 MILLIGRAM(S): at 21:32

## 2020-08-26 RX ADMIN — Medication 650 MILLIGRAM(S): at 21:34

## 2020-08-26 RX ADMIN — SODIUM CHLORIDE 3 MILLILITER(S): 9 INJECTION INTRAMUSCULAR; INTRAVENOUS; SUBCUTANEOUS at 14:42

## 2020-08-26 RX ADMIN — Medication 50 MILLIGRAM(S): at 10:47

## 2020-08-26 RX ADMIN — Medication 650 MILLIGRAM(S): at 21:30

## 2020-08-26 RX ADMIN — Medication 50 MILLIGRAM(S): at 04:58

## 2020-08-26 RX ADMIN — SODIUM CHLORIDE 3 MILLILITER(S): 9 INJECTION INTRAMUSCULAR; INTRAVENOUS; SUBCUTANEOUS at 21:34

## 2020-08-26 RX ADMIN — MORPHINE SULFATE 2 MILLIGRAM(S): 50 CAPSULE, EXTENDED RELEASE ORAL at 15:34

## 2020-08-26 RX ADMIN — ATORVASTATIN CALCIUM 40 MILLIGRAM(S): 80 TABLET, FILM COATED ORAL at 21:33

## 2020-08-26 RX ADMIN — AMLODIPINE BESYLATE 2.5 MILLIGRAM(S): 2.5 TABLET ORAL at 15:33

## 2020-08-26 RX ADMIN — Medication 10 MILLIGRAM(S): at 01:07

## 2020-08-26 RX ADMIN — FENTANYL CITRATE 25 MICROGRAM(S): 50 INJECTION INTRAVENOUS at 08:30

## 2020-08-26 RX ADMIN — MORPHINE SULFATE 2 MILLIGRAM(S): 50 CAPSULE, EXTENDED RELEASE ORAL at 22:50

## 2020-08-26 NOTE — PROGRESS NOTE ADULT - ASSESSMENT
35 year old male, PMH of GERD, HTN, active smoker for 12 years (1/2 PPD), family history of MI (father at 36yo), comes in from [facility] [place]  after presenting with shortness of breath for two days with frothy sputum. Patient had chest pain, with arm pain on his left. CXR showed what looked like pulmonary edema. Patient was given lopressor, lasix, and was started on nitro gtt. ST depressions noted on EKG. Patient taken to the Cath lab and was found to have EF of 30-40%, proximal LAD disease, ostial LAD, and proximal Circ. Patient had a right groin balloon pump inserted. Patient was brought to [facility] for further management.

## 2020-08-26 NOTE — PROGRESS NOTE ADULT - ASSESSMENT
A/P:  36yo male w/PMH of GERD, HTN (untreated), obesity (gained 80-100lbs in 1 year), ?ELSA, ?herniated disc (right lateral mid thigh numbness), anxiety, active smoker for 15 years (1/2-1 PPD), strong family history of early CAD and MI (both sides, father at 36yo MI & CABG). [place] [facility] [place]/p NSTEMI and IABP placement.  Patient has been experiencing increased TRINH for about 1-2wks. Saturday night noticed he was having difficulty laying flat d/t orthopnea and PND.  Symptoms worsened Sunday night into Monday morning. Then began getting sharp left chest pain w/left arm numbness/tingling into fingertips, nausea, and hot flashes and chills.  At [facility] Trop 0.87. EKG evolving to ST depressions inferolaterally. Pulm edema on CXR. Taken to Cath, found to have EF of 30-40%, proximal LAD disease, ostial LAD, and proximal Circ. Had a right groin IABP placed p/t transfer.     8/26: Patient resting comfortably w/o cardiac complaint.  VSS. Right groin IAPB removed this morning.    NSTEMI  -EKG w/inferolateral T wave inversions  -Trop 0.87 at [facility], now 1.65  -BnP 1384  -LHC ([facility] revealed EF of 30-40%, proximal LAD disease, ostial LAD, and proximal Circ  -Plan for CTSx intervention  -IAPB removed 8/26 am  -c/w nitro gtt, heparin gtt, asa, statin, lopressor    HfrEF  -euvolemic on exam  -TTE results as above    Hypertension  -BP better controlled when anxiety and back pain are controlled  -c/w current med regimen    Lifestyle  -patient confident in smoking cessation and diet/lifestyle modifications.

## 2020-08-26 NOTE — CHART NOTE - NSCHARTNOTEFT_GEN_A_CORE
PROCEDURE NOTE  REMOVAL OF INTRA AORTIC BALLOON PUMP    The IABP (intra-aortic balloon pump) was weaned according to protocol.  Hemodynamics remained stable.  Pulses in the right lower extremity are palpable/audible by doppler/absent.  The patient was placed in the supine position.  The insertion site was identified and the sutures were removed.  The IABP was turned off and the balloon deflated.  The IABP was then removed.  Direct pressure was applied for 45 minutes.  A sandbag was applied and is  to remain in place for three hours.    Monitoring of the right groin and both lower extremities including neuro-vascular checks and vital signs every 15 minutes  x4, then every 30 minutes x 2, then every 1 hr x 4 was ordered.      Complications: none

## 2020-08-26 NOTE — DIETITIAN INITIAL EVALUATION ADULT. - PERTINENT LABORATORY DATA
08-26 Na134 mmol/L<L> Glu 126 mg/dL<H> K+ 4.2 mmol/L Cr  0.94 mg/dL BUN 19.0 mg/dL Phos n/a   Alb n/a   PAB n/a

## 2020-08-26 NOTE — DIETITIAN INITIAL EVALUATION ADULT. - OTHER INFO
35 year old male PMH of GERD, HTN, active smoker for 12 years (1/2 PPD), family history of MI (father at 36yo), [place] [facility] [place] presenting with shortness of breath for two days with frothy sputum. Patient had chest pain, with arm pain on his left. CXR showed what looked like pulmonary edema. Patient was given lopressor, lasix, and was started on nitro gtt. ST depressions noted on EKG. Patient taken to the Cath lab and was found to have EF of 30-40%, proximal LAD disease, ostial LAD, and proximal Circ. Patient had a right groin balloon pump inserted, now removed. Plan for CABG. Pt reports overall good appetite/po intake PTA. Pt follows a regular diet. Denies any significant weight changes. Pt reports fair appetite/po intake since admission due to disliking food. Briefly discussed a heart healthy diet. Pt deferred full diet education/written literature until after surgery- RD to follow up.

## 2020-08-26 NOTE — PROGRESS NOTE ADULT - SUBJECTIVE AND OBJECTIVE BOX
Subjective:  36yo M very anxious, resting without c/o pain      HPI:  35 year old male, PMH of GERD, HTN, active smoker for 12 years (1/2 PPD), family history of MI (father at 34yo), [place] in [place] [facility] [place]  after presenting with shortness of breath for two days with frothy sputum. Patient had chest pain, with arm pain on his left. CXR showed what looked like pulmonary edema. Patient was given lopressor, lasix, and was started on nitro gtt. ST depressions noted on EKG. Patient taken to the Cath lab and was found to have EF of 30-40%, proximal LAD disease, ostial LAD, and proximal Circ. Patient had a right groin balloon pump inserted. Patient was brought to [place] for further management. (25 Aug 2020 00:01)          PAST MEDICAL & SURGICAL HISTORY:  History of ear surgery  Abdominal hernia          MEDICATIONS  (STANDING):  aspirin enteric coated 81 milliGRAM(s) Oral daily  atorvastatin 40 milliGRAM(s) Oral at bedtime  cefuroxime  IVPB 1500 milliGRAM(s) IV Intermittent once  heparin  Infusion 1400 Unit(s)/Hr (14 mL/Hr) IV Continuous <Continuous>  metoprolol tartrate 50 milliGRAM(s) Oral every 12 hours  nitroglycerin  Infusion 5 MICROgram(s)/Min (1.5 mL/Hr) IV Continuous <Continuous>  sodium chloride 0.9% lock flush 3 milliLiter(s) IV Push every 8 hours  vancomycin  IVPB 1500 milliGRAM(s) IV Intermittent once    MEDICATIONS  (PRN):  ALPRAZolam 0.25 milliGRAM(s) Oral every 8 hours PRN anxiety  morphine  - Injectable 2 milliGRAM(s) IV Push every 4 hours PRN Moderate Pain (4 - 6)          Allergies    No Known Allergies    Intolerances          WEIGHTS:  Daily     Daily   Admit Wt: Drug Dosing Weight  Height (cm): 177.8 (24 Aug 2020 20:30)  Weight (kg): 138.7 (24 Aug 2020 20:30)  BMI (kg/m2): 43.9 (24 Aug 2020 20:30)  BSA (m2): 2.5 (24 Aug 2020 20:30)  I&O's Summary    24 Aug 2020 07:01  -  25 Aug 2020 07:00  --------------------------------------------------------  IN: 1116 mL / OUT: 855 mL / NET: 261 mL    25 Aug 2020 07:01  -  26 Aug 2020 04:13  --------------------------------------------------------  IN: 542 mL / OUT: 950 mL / NET: -408 mL        VITAL SIGNS:  ICU Vital Signs Last 24 Hrs  T(C): 36.9 (26 Aug 2020 00:01), Max: 37.3 (25 Aug 2020 08:00)  T(F): 98.4 (26 Aug 2020 00:01), Max: 99.1 (25 Aug 2020 08:00)  HR: 97 (26 Aug 2020 04:00) (78 - 97)  BP: 157/72 (26 Aug 2020 04:00) (134/66 - 178/74)  BP(mean): 103 (26 Aug 2020 04:00) (87 - 127)  ABP: --  ABP(mean): --  RR: 23 (26 Aug 2020 04:00) (6 - 33)  SpO2: 95% (26 Aug 2020 04:00) (91% - 100%)        All laboratory results, radiology and medications reviewed.    LABS:  08-26    134<L>  |  97<L>  |  19.0  ----------------------------<  126<H>  4.2   |  23.0  |  0.94    Ca    9.1      26 Aug 2020 02:48  Mg     2.0     08-26    TPro  7.1  /  Alb  3.9  /  TBili  0.4  /  DBili  x   /  AST  27  /  ALT  24  /  AlkPhos  64  08-25                                 12.6   12.54 )-----------( 309      ( 26 Aug 2020 02:48 )             39.6          PT/INR - ( 24 Aug 2020 21:17 )   PT: 14.1 sec;   INR: 1.23 ratio         PTT - ( 25 Aug 2020 20:18 )  PTT:38.2 sec  Bilirubin Total, Serum: 0.4 mg/dL (08-25 @ 21:26)  Bilirubin Total, Serum: 0.6 mg/dL (08-25 @ 05:54)    ABG - ( 26 Aug 2020 01:47 )  pH, Arterial: 7.49  pH, Blood: x     /  pCO2: 32    /  pO2: 96    / HCO3: 26    / Base Excess: 1.6   /  SaO2: 99                CARDIAC MARKERS ( 25 Aug 2020 21:26 )  x     / 1.65 ng/mL / x     / x     / x      CARDIAC MARKERS ( 24 Aug 2020 21:17 )  x     / x     / 104 U/L / x     / x          CAPILLARY BLOOD GLUCOSE                 PHYSICAL EXAM:  General:  obese, no acute distress  Neurology:  alert and oriented X 4, nonfocal, no gross deficits  Respiratory:  clear to auscultation bilaterally  CV:  regular rate and rhythm, normal S1 S2  Abdomen:  obese, soft, nontender, nondistended, positive bowel sounds  Extremities:  warm, well perfused, 1+ edema +DP pulses

## 2020-08-26 NOTE — PROGRESS NOTE ADULT - SUBJECTIVE AND OBJECTIVE BOX
[place] [facility]                                                               Office: [address] [address]                                                              Telephone: [phone]. Fax:[phone]                                                                             PROGRESS NOTE  Reason for follow up: TVD awaiting CABG  Overnight: No new events.   Update: 8/26: Patient resting comfortably w/o cardiac complaint.  VSS. Right groin IAPB removed this morning.      Review of symptoms:   Cardiac:  No chest pain. No dyspnea. No palpitations.  Respiratory: No cough. No dyspnea  Gastrointestinal: No diarrhea. No abdominal pain. No bleeding.     Past medical history: No updates.   	  Vitals:  T(C): 37.2 (08-26-20 @ 12:00), Max: 37.2 (08-26-20 @ 12:00)  HR: 89 (08-26-20 @ 15:00) (79 - 102)  BP: 145/84 (08-26-20 @ 10:00) (134/66 - 178/74)  RR: 20 (08-26-20 @ 15:00) (6 - 33)  SpO2: 96% (08-26-20 @ 15:00) (84% - 100%)  Wt(kg): --  I&O's Summary    25 Aug 2020 07:01  -  26 Aug 2020 07:00  --------------------------------------------------------  IN: 642 mL / OUT: 1070 mL / NET: -428 mL    26 Aug 2020 07:01  -  26 Aug 2020 15:51  --------------------------------------------------------  IN: 240 mL / OUT: 80 mL / NET: 160 mL      Weight (kg): 138.7 (08-24 @ 20:30)      PHYSICAL EXAM:  Appearance: Comfortable. No acute distress  HEENT:  Head and neck: Atraumatic. Normocephalic.  Normal oral mucosa, PERRL, Neck is supple. No JVD, No carotid bruit.   Neurologic: A&Ox 3, no focal deficits. EOMI, Cranial nerves are intact.  Lymphatic: No cervical lymphadenopathy  Cardiovascular: Normal S1 S2, No murmur, rubs/gallops. No JVD, No edema  Respiratory: Lungs clear to auscultation  Gastrointestinal:  Soft, Non-tender, + BS  Lower Extremities: No edema  Psychiatry: Patient is calm. No agitation. Mood & affect appropriate  Skin: No rashes/ecchymoses/cyanosis/ulcers visualized on the face, hands or feet.      CURRENT MEDICATIONS:  amLODIPine   Tablet 2.5 milliGRAM(s) Oral daily  metoprolol tartrate 50 milliGRAM(s) Oral three times a day  nitroglycerin  Infusion 5 MICROgram(s)/Min IV Continuous <Continuous>    cefuroxime  IVPB  vancomycin  IVPB  atorvastatin  aspirin enteric coated  heparin  Infusion  sodium chloride 0.9% lock flush      DIAGNOSTIC TESTING:  [ ] Echocardiogram:   < from: TTE Echo Complete w/o Contrast w/ Doppler (08.25.20 @ 09:59) >    Summary:   1. Technically difficult study with poor endocardial visualization.   2. Endocardial visualization was enhanced with intravenous echo contrast.   3. The left ventricle endocardium is not well visualized despite use of IV echo contrast. Left ventricle systolic function appears moderatedly reduced estimated LVEF   40%. Global left ventricle hypokinesis. Mildly increased septal wall thickness. Moderately increased posterior wall thickness.   4. The right ventricle was not well visualized.   5. No aortic stenosis.   6. Pericardium not well visualized.   7. Recommend clinical correlation with the above findings.    Shelbi Langston MD Electronically signed on 8/25/2020 at 3:35:22 PM    < end of copied text >    [ ]  Catheterization:  [ ] Stress Test:    OTHER: 	      LABS:	 	  CARDIAC MARKERS ( 25 Aug 2020 21:26 )  x     / 1.65 ng/mL / x     / x     / x      p-BNP 25 Aug 2020 21:26: x    , CARDIAC MARKERS ( 24 Aug 2020 21:17 )  x     / x     / 104 U/L / x     / x      p-BNP 24 Aug 2020 21:17: 1384 pg/mL                          12.6   12.54 )-----------( 309      ( 26 Aug 2020 02:48 )             39.6     08-26    134<L>  |  97<L>  |  19.0  ----------------------------<  126<H>  4.2   |  23.0  |  0.94    Ca    9.1      26 Aug 2020 02:48  Mg     2.0     08-26    TPro  7.1  /  Alb  3.9  /  TBili  0.4  /  DBili  x   /  AST  27  /  ALT  24  /  AlkPhos  64  08-25    proBNP: Serum Pro-Brain Natriuretic Peptide: 1384 pg/mL (08-24 @ 21:17)    Lipid Profile:   HgA1c:   TSH: Thyroid Stimulating Hormone, Serum: 1.82 uIU/mL        TELEMETRY: SR 80s

## 2020-08-26 NOTE — DIETITIAN INITIAL EVALUATION ADULT. - PERTINENT MEDS FT
MEDICATIONS  (STANDING):  aspirin enteric coated 81 milliGRAM(s) Oral daily  atorvastatin 40 milliGRAM(s) Oral at bedtime  cefuroxime  IVPB 1500 milliGRAM(s) IV Intermittent once  heparin  Infusion 1400 Unit(s)/Hr (14 mL/Hr) IV Continuous <Continuous>  metoprolol tartrate 50 milliGRAM(s) Oral three times a day  nitroglycerin  Infusion 5 MICROgram(s)/Min (1.5 mL/Hr) IV Continuous <Continuous>  sodium chloride 0.9% lock flush 3 milliLiter(s) IV Push every 8 hours  vancomycin  IVPB 1500 milliGRAM(s) IV Intermittent once    MEDICATIONS  (PRN):  ALPRAZolam 0.25 milliGRAM(s) Oral every 8 hours PRN anxiety  morphine  - Injectable 2 milliGRAM(s) IV Push every 4 hours PRN Moderate Pain (4 - 6)

## 2020-08-27 ENCOUNTER — TRANSCRIPTION ENCOUNTER (OUTPATIENT)
Age: 35
End: 2020-08-27

## 2020-08-27 DIAGNOSIS — K62.5 HEMORRHAGE OF ANUS AND RECTUM: ICD-10-CM

## 2020-08-27 LAB
ANION GAP SERPL CALC-SCNC: 11 MMOL/L — SIGNIFICANT CHANGE UP (ref 5–17)
BUN SERPL-MCNC: 15 MG/DL — SIGNIFICANT CHANGE UP (ref 8–20)
CALCIUM SERPL-MCNC: 9.2 MG/DL — SIGNIFICANT CHANGE UP (ref 8.6–10.2)
CHLORIDE SERPL-SCNC: 102 MMOL/L — SIGNIFICANT CHANGE UP (ref 98–107)
CO2 SERPL-SCNC: 25 MMOL/L — SIGNIFICANT CHANGE UP (ref 22–29)
CREAT SERPL-MCNC: 0.88 MG/DL — SIGNIFICANT CHANGE UP (ref 0.5–1.3)
GLUCOSE SERPL-MCNC: 105 MG/DL — HIGH (ref 70–99)
HCT VFR BLD CALC: 42.2 % — SIGNIFICANT CHANGE UP (ref 39–50)
HGB BLD-MCNC: 13.3 G/DL — SIGNIFICANT CHANGE UP (ref 13–17)
MAGNESIUM SERPL-MCNC: 2.1 MG/DL — SIGNIFICANT CHANGE UP (ref 1.6–2.6)
MCHC RBC-ENTMCNC: 27.7 PG — SIGNIFICANT CHANGE UP (ref 27–34)
MCHC RBC-ENTMCNC: 31.5 GM/DL — LOW (ref 32–36)
MCV RBC AUTO: 87.7 FL — SIGNIFICANT CHANGE UP (ref 80–100)
PA ADP PRP-ACNC: 157 PRU — LOW (ref 180–376)
PLATELET # BLD AUTO: 288 K/UL — SIGNIFICANT CHANGE UP (ref 150–400)
POTASSIUM SERPL-MCNC: 4.5 MMOL/L — SIGNIFICANT CHANGE UP (ref 3.5–5.3)
POTASSIUM SERPL-SCNC: 4.5 MMOL/L — SIGNIFICANT CHANGE UP (ref 3.5–5.3)
RBC # BLD: 4.81 M/UL — SIGNIFICANT CHANGE UP (ref 4.2–5.8)
RBC # FLD: 15.1 % — HIGH (ref 10.3–14.5)
SODIUM SERPL-SCNC: 138 MMOL/L — SIGNIFICANT CHANGE UP (ref 135–145)
T3 SERPL-MCNC: 103 NG/DL — SIGNIFICANT CHANGE UP (ref 80–200)
WBC # BLD: 12.52 K/UL — HIGH (ref 3.8–10.5)
WBC # FLD AUTO: 12.52 K/UL — HIGH (ref 3.8–10.5)

## 2020-08-27 PROCEDURE — 99291 CRITICAL CARE FIRST HOUR: CPT | Mod: 57

## 2020-08-27 PROCEDURE — 99222 1ST HOSP IP/OBS MODERATE 55: CPT

## 2020-08-27 PROCEDURE — 71045 X-RAY EXAM CHEST 1 VIEW: CPT | Mod: 26

## 2020-08-27 PROCEDURE — 99232 SBSQ HOSP IP/OBS MODERATE 35: CPT

## 2020-08-27 RX ORDER — PHENYLEPHRINE-SHARK LIVER OIL-MINERAL OIL-PETROLATUM RECTAL OINTMENT
1 OINTMENT (GRAM) RECTAL
Refills: 0 | Status: DISCONTINUED | OUTPATIENT
Start: 2020-08-27 | End: 2020-08-28

## 2020-08-27 RX ORDER — SENNA PLUS 8.6 MG/1
2 TABLET ORAL AT BEDTIME
Refills: 0 | Status: DISCONTINUED | OUTPATIENT
Start: 2020-08-27 | End: 2020-08-28

## 2020-08-27 RX ORDER — POLYETHYLENE GLYCOL 3350 17 G/17G
17 POWDER, FOR SOLUTION ORAL DAILY
Refills: 0 | Status: DISCONTINUED | OUTPATIENT
Start: 2020-08-27 | End: 2020-08-28

## 2020-08-27 RX ORDER — METOPROLOL TARTRATE 50 MG
50 TABLET ORAL EVERY 6 HOURS
Refills: 0 | Status: DISCONTINUED | OUTPATIENT
Start: 2020-08-27 | End: 2020-08-28

## 2020-08-27 RX ORDER — ALPRAZOLAM 0.25 MG
0.5 TABLET ORAL ONCE
Refills: 0 | Status: DISCONTINUED | OUTPATIENT
Start: 2020-08-27 | End: 2020-08-27

## 2020-08-27 RX ORDER — LANOLIN ALCOHOL/MO/W.PET/CERES
5 CREAM (GRAM) TOPICAL AT BEDTIME
Refills: 0 | Status: COMPLETED | OUTPATIENT
Start: 2020-08-27 | End: 2020-08-27

## 2020-08-27 RX ADMIN — ATORVASTATIN CALCIUM 40 MILLIGRAM(S): 80 TABLET, FILM COATED ORAL at 21:44

## 2020-08-27 RX ADMIN — SODIUM CHLORIDE 3 MILLILITER(S): 9 INJECTION INTRAMUSCULAR; INTRAVENOUS; SUBCUTANEOUS at 05:58

## 2020-08-27 RX ADMIN — Medication 50 MILLIGRAM(S): at 05:57

## 2020-08-27 RX ADMIN — Medication 50 MILLIGRAM(S): at 22:59

## 2020-08-27 RX ADMIN — SENNA PLUS 2 TABLET(S): 8.6 TABLET ORAL at 21:44

## 2020-08-27 RX ADMIN — Medication 81 MILLIGRAM(S): at 11:35

## 2020-08-27 RX ADMIN — Medication 0.25 MILLIGRAM(S): at 14:21

## 2020-08-27 RX ADMIN — Medication 50 MILLIGRAM(S): at 13:14

## 2020-08-27 RX ADMIN — Medication 50 MILLIGRAM(S): at 17:42

## 2020-08-27 RX ADMIN — SODIUM CHLORIDE 3 MILLILITER(S): 9 INJECTION INTRAMUSCULAR; INTRAVENOUS; SUBCUTANEOUS at 21:45

## 2020-08-27 RX ADMIN — Medication 5 MILLIGRAM(S): at 22:10

## 2020-08-27 RX ADMIN — POLYETHYLENE GLYCOL 3350 17 GRAM(S): 17 POWDER, FOR SOLUTION ORAL at 11:36

## 2020-08-27 RX ADMIN — AMLODIPINE BESYLATE 2.5 MILLIGRAM(S): 2.5 TABLET ORAL at 05:57

## 2020-08-27 RX ADMIN — SODIUM CHLORIDE 3 MILLILITER(S): 9 INJECTION INTRAMUSCULAR; INTRAVENOUS; SUBCUTANEOUS at 13:14

## 2020-08-27 RX ADMIN — Medication 0.5 MILLIGRAM(S): at 21:44

## 2020-08-27 NOTE — PROGRESS NOTE ADULT - SUBJECTIVE AND OBJECTIVE BOX
Subjective:  34yo M resting comfortable, no c/o of CP.      HPI:  35 year old male, PMH of GERD, HTN, active smoker for 12 years (1/2 PPD), family history of MI (father at 36yo), comes in from [facility]  after presenting with shortness of breath for two days with frothy sputum. Patient had chest pain, with arm pain on his left. CXR showed what looked like pulmonary edema. Patient was given lopressor, lasix, and was started on nitro gtt. ST depressions noted on EKG. Patient taken to the Cath lab and was found to have EF of 30-40%, proximal LAD disease, ostial LAD, and proximal Circ. Patient had a right groin balloon pump inserted. Patient was brought to [place] for further management. (25 Aug 2020 00:01)          PAST MEDICAL & SURGICAL HISTORY:  History of ear surgery  Abdominal hernia          MEDICATIONS  (STANDING):  amLODIPine   Tablet 2.5 milliGRAM(s) Oral daily  aspirin enteric coated 81 milliGRAM(s) Oral daily  atorvastatin 40 milliGRAM(s) Oral at bedtime  cefuroxime  IVPB 1500 milliGRAM(s) IV Intermittent once  metoprolol tartrate 50 milliGRAM(s) Oral three times a day  nitroglycerin  Infusion 5 MICROgram(s)/Min (1.5 mL/Hr) IV Continuous <Continuous>  sodium chloride 0.9% lock flush 3 milliLiter(s) IV Push every 8 hours  vancomycin  IVPB 1500 milliGRAM(s) IV Intermittent once    MEDICATIONS  (PRN):  ALPRAZolam 0.25 milliGRAM(s) Oral every 8 hours PRN anxiety  morphine  - Injectable 2 milliGRAM(s) IV Push every 4 hours PRN Moderate Pain (4 - 6)  nitroglycerin     SubLingual 0.4 milliGRAM(s) SubLingual every 5 minutes PRN Chest Pain          Allergies    No Known Allergies    Intolerances          WEIGHTS:  Daily     Daily Weight in k.2 (26 Aug 2020 13:36)  Admit Wt: Drug Dosing Weight  Height (cm): 177.8 (24 Aug 2020 20:30)  Weight (kg): 138.7 (24 Aug 2020 20:30)  BMI (kg/m2): 43.9 (24 Aug 2020 20:30)  BSA (m2): 2.5 (24 Aug 2020 20:30)  I&O's Summary    25 Aug 2020 07:01  -  26 Aug 2020 07:00  --------------------------------------------------------  IN: 642 mL / OUT: 1070 mL / NET: -428 mL    26 Aug 2020 07:01  -  27 Aug 2020 02:54  --------------------------------------------------------  IN: 840 mL / OUT: 1775 mL / NET: -935 mL        VITAL SIGNS:  ICU Vital Signs Last 24 Hrs  T(C): 36.7 (26 Aug 2020 22:00), Max: 37.2 (26 Aug 2020 12:00)  T(F): 98.1 (26 Aug 2020 22:00), Max: 99 (26 Aug 2020 12:00)  HR: 74 (27 Aug 2020 02:00) (70 - 102)  BP: 158/72 (27 Aug 2020 02:00) (123/62 - 161/100)  BP(mean): 103 (27 Aug 2020 02:00) (82 - 133)  ABP: 168/81 (26 Aug 2020 19:00) (128/68 - 168/81)  ABP(mean): 102 (26 Aug 2020 19:00) (78 - 102)  RR: 24 (26 Aug 2020 22:35) (9 - 24)  SpO2: 96% (27 Aug 2020 02:00) (84% - 100%)        All laboratory results, radiology and medications reviewed.    LABS:      134<L>  |  97<L>  |  19.0  ----------------------------<  126<H>  4.2   |  23.0  |  0.94    Ca    9.1      26 Aug 2020 02:48  Mg     2.0         TPro  7.1  /  Alb  3.9  /  TBili  0.4  /  DBili  x   /  AST  27  /  ALT  24  /  AlkPhos  64  08-25                                 12.6   12.54 )-----------( 309      ( 26 Aug 2020 02:48 )             39.6          PTT - ( 25 Aug 2020 20:18 )  PTT:38.2 sec    ABG - ( 26 Aug 2020 01:47 )  pH, Arterial: 7.49  pH, Blood: x     /  pCO2: 32    /  pO2: 96    / HCO3: 26    / Base Excess: 1.6   /  SaO2: 99                CARDIAC MARKERS ( 25 Aug 2020 21:26 )  x     / 1.65 ng/mL / x     / x     / x          CAPILLARY BLOOD GLUCOSE                 PHYSICAL EXAM:  General:  well nourished, obese, no acute distress  Neurology:  alert and oriented X 4, nonfocal, no gross deficits  Respiratory:  clear to auscultation bilaterally  CV:  regular rate and rhythm, normal S1 S2  Abdomen:  obese, soft, nontender, nondistended, positive bowel sounds  Extremities:  warm, well perfused, 1+ edema +DP pulses

## 2020-08-27 NOTE — PROGRESS NOTE ADULT - ASSESSMENT
36yo male w/PMH of GERD, HTN (untreated), obesity (gained 80-100lbs in 1 year), ?ELSA, ?herniated disc (right lateral mid thigh numbness), anxiety, active smoker for 15 years (1/2-1 PPD), strong family history of early CAD and MI (both sides, father at 34yo MI & CABG). [place] [facility]/p [place]STEMI and IABP placement.  Patient has been experiencing increased TRINH for about 1-2wks. Saturday night noticed he was having difficulty laying flat d/t orthopnea and PND.  Symptoms worsened Sunday night into Monday morning. Then began getting sharp left chest pain w/left arm numbness/tingling into fingertips, nausea, and hot flashes and chills.  [place] [facility] [place] 0.87. EKG evolving to ST depressions inferolaterally. Pulm edema on CXR. Taken to Cath, found to have EF of 30-40%, proximal LAD disease, ostial LAD, and proximal Circ. Had a right groin IABP placed p/t transfer.     8/27: Pt denies chest pain, palpitations, SOB. Pt awaiting procedure, pt stated he had all his concerns and questions addressed. Plan for CTSX surgical procedure 8/28. Cont. current medication regimen. Tele-NSR HR @ 80-90s       NSTEMI  -EKG w/inferolateral T wave inversions  -Trop 0.87 [place] [facility], now 1.65  -BNP 1384  -[place]HC [place] revealed EF of 30-40%, proximal LAD disease, ostial LAD, and proximal Circ   -Plan for CTSx intervention 8/28, pt awaiting procedure, pt stated he had all his concerns and questions addressed  -c/w asa, statin, lopressor    HfrEF  -euvolemic on exam  -TTE results as above    Hypertension  -BP better controlled when anxiety and back pain are controlled  -c/w current med regimen    Lifestyle  -patient confident in smoking cessation and diet/lifestyle modifications

## 2020-08-27 NOTE — PROGRESS NOTE ADULT - SUBJECTIVE AND OBJECTIVE BOX
[place] [facility]                                                               Office: [address] [address]                                                              Telephone: [phone]. Fax:[phone]                                                                             PROGRESS NOTE  Reason for follow up: chest pain and shortness of breath  Overnight: No new events.   Update: 8/27: Pt denies chest pain, palpitations, SOB. Pt awaiting procedure, pt stated he had all his concerns and questions addressed. Plan for CTSX surgical procedure 8/28. Cont. current medication regimen. Tele-NSR HR @ 80-90s.    Subjective: "I'm having surgery tomorrow "      	  Vitals:  T(C): 37.4 (08-27-20 @ 13:00), Max: 37.4 (08-27-20 @ 13:00)  HR: 89 (08-27-20 @ 15:30) (70 - 96)  BP: 165/81 (08-27-20 @ 15:30) (123/62 - 173/88)  RR: 17 (08-27-20 @ 15:00) (17 - 26)  SpO2: 98% (08-27-20 @ 15:30) (94% - 100%)    I&O's Summary    26 Aug 2020 07:01  -  27 Aug 2020 07:00  --------------------------------------------------------  IN: 840 mL / OUT: 1900 mL / NET: -1060 mL    27 Aug 2020 07:01  -  27 Aug 2020 15:44  --------------------------------------------------------  IN: 720 mL / OUT: 750 mL / NET: -30 mL      Weight (kg): 138.7 (08-24 @ 20:30)      PHYSICAL EXAM:  Appearance: Comfortable. No acute distress  HEENT:  Head and neck: Atraumatic. Normocephalic.  Normal oral mucosa, PERRL, Neck is supple. No JVD, No carotid bruit.   Neurologic: A & O x 3, no focal deficits. EOMI.  Lymphatic: No cervical lymphadenopathy  Cardiovascular: Normal S1 S2, No murmur, rubs/gallops. No JVD, No edema  Respiratory: Lungs clear to auscultation  Gastrointestinal:  Soft, Non-tender, + BS  Lower Extremities: No edema  Psychiatry: Patient is calm. No agitation. Mood & affect appropriate  Skin: No rashes/ ecchymoses/cyanosis/ulcers visualized on the face, hands or feet.      CURRENT MEDICATIONS:  amLODIPine   Tablet 2.5 milliGRAM(s) Oral daily  metoprolol tartrate 50 milliGRAM(s) Oral three times a day  nitroglycerin     SubLingual 0.4 milliGRAM(s) SubLingual every 5 minutes PRN  cefuroxime  IVPB  vancomycin  IVPB  polyethylene glycol 3350  senna  atorvastatin  aspirin enteric coated  hemorrhoidal Ointment  sodium chloride 0.9% lock flush      DIAGNOSTIC TESTING:  [ ] Echocardiogram: < from: TTE Echo Complete w/o Contrast w/ Doppler (08.25.20 @ 09:59) >  Summary:   1. Technically difficult study with poor endocardial visualization.   2. Endocardial visualization was enhanced with intravenous echo contrast.   3. The left ventricle endocardium is not well visualized despite use of IV echo contrast. Left ventricle systolic function appears moderatedly reduced estimated LVEF   40%. Global left ventricle hypokinesis. Mildly increased septal wall thickness. Moderately increased posterior wall thickness.   4. The right ventricle was not well visualized.   5. No aortic stenosis.   6. Pericardium not well visualized.   7. Recommend clinical correlation with the above findings.    < end of copied text >        OTHER: 	  XRAY: < from: Xray Chest 1 View- PORTABLE-Routine (08.27.20 @ 05:25) >    Lungs/Pleura:  Prominence of the pulmonary vasculature, likely pulmonary edema, increased since 8/26/2020.    Heart/Mediastinum:  Cardiomediastinal silhouette is stable.    Bones:  No acute osseous abnormality.    Impression:    Pulmonary edema, increased since 8/26/2020.    < end of copied text >      LABS:	 	  CARDIAC MARKERS ( 25 Aug 2020 21:26 )  x     / 1.65 ng/mL / x     / x     / x      p-BNP 25 Aug 2020 21:26: x    , CARDIAC MARKERS ( 24 Aug 2020 21:17 )  x     / x     / 104 U/L / x     / x      p-BNP 24 Aug 2020 21:17: 1384 pg/mL                          13.3   12.52 )-----------( 288      ( 27 Aug 2020 02:43 )             42.2     08-27    138  |  102  |  15.0  ----------------------------<  105<H>  4.5   |  25.0  |  0.88    Ca    9.2      27 Aug 2020 02:43  Mg     2.1     08-27    TPro  7.1  /  Alb  3.9  /  TBili  0.4  /  DBili  x   /  AST  27  /  ALT  24  /  AlkPhos  64  08-25    proBNP: Serum Pro-Brain Natriuretic Peptide: 1384 pg/mL (08-24 @ 21:17)      TSH: Thyroid Stimulating Hormone, Serum: 1.82 uIU/mL        TELEMETRY: NSR HR @ 80-90s

## 2020-08-27 NOTE — CONSULT NOTE ADULT - PROBLEM SELECTOR RECOMMENDATION 9
due to internal hemorrhoids worse due to mild chronic constipation and anticoagulation. Suggest docusate sodium BID and miralax daily in the perioperative period. Metamucil and/or senna may also be of benefit if needed. I would not be surprised if he still has some bleeding post op with anticoagulation but no anemia at this time.

## 2020-08-27 NOTE — CONSULT NOTE ADULT - SUBJECTIVE AND OBJECTIVE BOX
Patient is a 35y old  Male who presents with a chief complaint of chest pain and shortness of breath (27 Aug 2020 02:54)      HPI:  35 year old male, PMH of GERD, HTN, active smoker for 12 years (1/2 PPD), family history of MI (father at 34yo), comes in from [facility]  after presenting with shortness of breath for two days with frothy sputum. Patient had chest pain, with arm pain on his left. CXR showed what looked like pulmonary edema. Patient was given lopressor, lasix, and was started on nitro gtt. ST depressions noted on EKG. Patient taken to the Cath lab and was found to have EF of 30-40%, proximal LAD disease, ostial LAD, and proximal Circ. Patient had a right groin balloon pump inserted. Patient was brought to [place] for further management. (25 Aug 2020 00:01)    Asked to see patient due to passage of BRBPR into the commode with BM X 2 today.      REVIEW OF SYSTEMS:    CONSTITUTIONAL: No fever, weight loss, or fatigue  EYES: No eye pain, visual disturbances, or discharge  ENMT:  No difficulty hearing, tinnitus, vertigo; No sinus or throat pain  NECK: No pain or stiffness  RESPIRATORY: No cough, wheezing, chills or hemoptysis; No shortness of breath  CARDIOVASCULAR: No chest pain, palpitations, dizziness, or leg swelling  GASTROINTESTINAL: No abdominal or epigastric pain. No nausea, vomiting, or hematemesis; No diarrhea or constipation. No melena or hematochezia.  NEUROLOGICAL: No headaches, memory loss, loss of strength, numbness, or tremors  SKIN: No itching, burning, rashes, or lesions   LYMPH NODES: No enlarged glands  MUSCULOSKELETAL: No joint pain or swelling; No muscle, back, or extremity pain  PSYCHIATRIC: No depression, anxiety, mood swings, or difficulty sleeping  HEME/LYMPH: No easy bruising, or bleeding gums  ALLERY AND IMMUNOLOGIC: No hives or eczema      PAST MEDICAL & SURGICAL HISTORY:  History of ear surgery  Abdominal hernia      FAMILY HISTORY:      SOCIAL HISTORY:  Smoking Status: [ ] Current, [ ] Former, [ ] Never  Pack Years:  Alcohol Use:    Home Medications:      MEDICATIONS:  MEDICATIONS  (STANDING):  amLODIPine   Tablet 2.5 milliGRAM(s) Oral daily  aspirin enteric coated 81 milliGRAM(s) Oral daily  atorvastatin 40 milliGRAM(s) Oral at bedtime  cefuroxime  IVPB 1500 milliGRAM(s) IV Intermittent once  hemorrhoidal Ointment 1 Application(s) Rectal two times a day  metoprolol tartrate 50 milliGRAM(s) Oral three times a day  nitroglycerin  Infusion 5 MICROgram(s)/Min (1.5 mL/Hr) IV Continuous <Continuous>  polyethylene glycol 3350 17 Gram(s) Oral daily  senna 2 Tablet(s) Oral at bedtime  sodium chloride 0.9% lock flush 3 milliLiter(s) IV Push every 8 hours  vancomycin  IVPB 1500 milliGRAM(s) IV Intermittent once    MEDICATIONS  (PRN):  ALPRAZolam 0.25 milliGRAM(s) Oral every 8 hours PRN anxiety  bisacodyl 5 milliGRAM(s) Oral every 12 hours PRN Constipation  morphine  - Injectable 2 milliGRAM(s) IV Push every 4 hours PRN Moderate Pain (4 - 6)  nitroglycerin     SubLingual 0.4 milliGRAM(s) SubLingual every 5 minutes PRN Chest Pain      Allergies    No Known Allergies    Intolerances        Vital Signs Last 24 Hrs  T(C): 37.3 (27 Aug 2020 09:00), Max: 37.3 (27 Aug 2020 09:00)  T(F): 99.2 (27 Aug 2020 09:00), Max: 99.2 (27 Aug 2020 09:00)  HR: 90 (27 Aug 2020 10:00) (70 - 96)  BP: 150/93 (27 Aug 2020 10:00) (123/62 - 158/72)  BP(mean): 119 (27 Aug 2020 10:00) (83 - 119)  RR: 18 (27 Aug 2020 10:00) (15 - 26)  SpO2: 99% (27 Aug 2020 10:00) (94% - 100%)    08-26 @ 07:01  -  08-27 @ 07:00  --------------------------------------------------------  IN: 840 mL / OUT: 1900 mL / NET: -1060 mL    08-27 @ 07:01 - 08-27 @ 11:28  --------------------------------------------------------  IN: 360 mL / OUT: 200 mL / NET: 160 mL          PHYSICAL EXAM:    General: Well developed; well nourished; in no acute distress  HEENT: MMM, conjunctiva and sclera clear  Lungs: Clear  Heart: Rhythm Regular, No Murmurs  Gastrointestinal: Soft, non-tender non-distended; Normal bowel sounds; No rebound or guarding; No Organomegaly & No Masses  Extremities: Normal range of motion, No clubbing, cyanosis or edema  Neurological: Alert and oriented x3, Non-focal  Skin: Warm and dry. No obvious rash  Rectal: No Masses      LABS:                        13.3   12.52 )-----------( 288      ( 27 Aug 2020 02:43 )             42.2     08-27    138  |  102  |  15.0  ----------------------------<  105<H>  4.5   |  25.0  |  0.88    Ca    9.2      27 Aug 2020 02:43  Mg     2.1     08-27    TPro  7.1  /  Alb  3.9  /  TBili  0.4  /  DBili  x   /  AST  27  /  ALT  24  /  AlkPhos  64  08-25          RADIOLOGY & ADDITIONAL STUDIES: Patient is a 35y old  Male who presents with a chief complaint of chest pain and shortness of breath (27 Aug 2020 02:54)      HPI:  35 year old male, PMH of GERD, HTN, active smoker for 12 years (1/2 PPD), family history of MI (father at 36yo), comes in from [facility] [place]  after presenting with shortness of breath for two days with frothy sputum. Patient had chest pain, with arm pain on his left. CXR showed what looked like pulmonary edema. Patient was given lopressor, lasix, and was started on nitro gtt. ST depressions noted on EKG. Patient taken to the Cath lab and was found to have EF of 30-40%, proximal LAD disease, ostial LAD, and proximal Circ. Patient had a right groin balloon pump inserted. Patient was brought to [place] for further management. (25 Aug 2020 00:01)    Asked to see patient due to passage of BRBPR into the commode with BM X 2 today. He has hx of occasional blood per rectum for three years, averaging once monthly. InNovember of 2019 he underwent colonoscopy by Dr. Dangelo in [place] for evaluation which revealed hemorrhoids only. He has a daily BM but it is usually hard and he strains. Now without BM for three days until today. denies abdominal or rectal pain. No nausea or vomiting. H?H normal and stable.      REVIEW OF SYSTEMS:    CONSTITUTIONAL: No fever, weight loss, or fatigue  EYES: No eye pain, visual disturbances, or discharge  ENMT:  No difficulty hearing, tinnitus, vertigo; No sinus or throat pain  NECK: No pain or stiffness  RESPIRATORY: No cough, wheezing, chills or hemoptysis; No shortness of breath  CARDIOVASCULAR: No chest pain, palpitations, dizziness, or leg swelling  GASTROINTESTINAL: as above  NEUROLOGICAL: No headaches, memory loss, loss of strength, numbness, or tremors  SKIN: No itching, burning, rashes, or lesions   LYMPH NODES: No enlarged glands  MUSCULOSKELETAL: No joint pain or swelling; No muscle, back, or extremity pain  PSYCHIATRIC: No depression, anxiety, mood swings, or difficulty sleeping  HEME/LYMPH: No easy bruising, or bleeding gums  ALLERY AND IMMUNOLOGIC: No hives or eczema      PAST MEDICAL & SURGICAL HISTORY:  History of ear surgery  Abdominal hernia, inguinal, as a child  hypertension      FAMILY HISTORY:      SOCIAL HISTORY:  Smoking Status: [x ] Current, [ ] Former, [ ] Never-1/2 ppd  Pack Years:  Alcohol Use: heavy on some weekends    Home Medications:      MEDICATIONS:  MEDICATIONS  (STANDING):  amLODIPine   Tablet 2.5 milliGRAM(s) Oral daily  aspirin enteric coated 81 milliGRAM(s) Oral daily  atorvastatin 40 milliGRAM(s) Oral at bedtime  cefuroxime  IVPB 1500 milliGRAM(s) IV Intermittent once  hemorrhoidal Ointment 1 Application(s) Rectal two times a day  metoprolol tartrate 50 milliGRAM(s) Oral three times a day  nitroglycerin  Infusion 5 MICROgram(s)/Min (1.5 mL/Hr) IV Continuous <Continuous>  polyethylene glycol 3350 17 Gram(s) Oral daily  senna 2 Tablet(s) Oral at bedtime  sodium chloride 0.9% lock flush 3 milliLiter(s) IV Push every 8 hours  vancomycin  IVPB 1500 milliGRAM(s) IV Intermittent once    MEDICATIONS  (PRN):  ALPRAZolam 0.25 milliGRAM(s) Oral every 8 hours PRN anxiety  bisacodyl 5 milliGRAM(s) Oral every 12 hours PRN Constipation  morphine  - Injectable 2 milliGRAM(s) IV Push every 4 hours PRN Moderate Pain (4 - 6)  nitroglycerin     SubLingual 0.4 milliGRAM(s) SubLingual every 5 minutes PRN Chest Pain      Allergies    No Known Allergies    Intolerances        Vital Signs Last 24 Hrs  T(C): 37.3 (27 Aug 2020 09:00), Max: 37.3 (27 Aug 2020 09:00)  T(F): 99.2 (27 Aug 2020 09:00), Max: 99.2 (27 Aug 2020 09:00)  HR: 90 (27 Aug 2020 10:00) (70 - 96)  BP: 150/93 (27 Aug 2020 10:00) (123/62 - 158/72)  BP(mean): 119 (27 Aug 2020 10:00) (83 - 119)  RR: 18 (27 Aug 2020 10:00) (15 - 26)  SpO2: 99% (27 Aug 2020 10:00) (94% - 100%)    08-26 @ 07:01  -  08-27 @ 07:00  --------------------------------------------------------  IN: 840 mL / OUT: 1900 mL / NET: -1060 mL    08-27 @ 07:01 - 08-27 @ 11:28  --------------------------------------------------------  IN: 360 mL / OUT: 200 mL / NET: 160 mL          PHYSICAL EXAM:    General: obese but well nourished; in no acute distress  HEENT: MMM, conjunctiva and sclera clear  Lungs: Clear  Heart: Rhythm Regular, No Murmurs  Gastrointestinal: Soft, non-tender non-distended; Normal bowel sounds; No rebound or guarding; No Organomegaly & No Masses  Extremities: Normal range of motion, No clubbing, cyanosis or edema  Neurological: Alert and oriented x3, Non-focal  Skin: Warm and dry. No obvious rash  Rectal: No Masses, scant brown stool with some red scant blood admixed      LABS:                        13.3   12.52 )-----------( 288      ( 27 Aug 2020 02:43 )             42.2     08-27    138  |  102  |  15.0  ----------------------------<  105<H>  4.5   |  25.0  |  0.88    Ca    9.2      27 Aug 2020 02:43  Mg     2.1     08-27    TPro  7.1  /  Alb  3.9  /  TBili  0.4  /  DBili  x   /  AST  27  /  ALT  24  /  AlkPhos  64  08-25

## 2020-08-27 NOTE — PROGRESS NOTE ADULT - ASSESSMENT
35 year old male, PMH of GERD, HTN, active smoker for 12 years (1/2 PPD), family history of MI (father at 36yo), comes in from [facility] [place]  after presenting with shortness of breath for two days with frothy sputum. Patient had chest pain, with arm pain on his left. CXR showed what looked like pulmonary edema. Patient was given lopressor, lasix, and was started on nitro gtt. ST depressions noted on EKG. Patient taken to the Cath lab and was found to have EF of 30-40%, proximal LAD disease, ostial LAD, and proximal Circ. Patient had a right groin balloon pump inserted. Patient was brought to [facility] for further management.   8/26 IABP d/c'd without event.

## 2020-08-28 ENCOUNTER — APPOINTMENT (OUTPATIENT)
Dept: CARDIOTHORACIC SURGERY | Facility: HOSPITAL | Age: 35
End: 2020-08-28
Payer: COMMERCIAL

## 2020-08-28 LAB
ALBUMIN SERPL ELPH-MCNC: 3.4 G/DL — SIGNIFICANT CHANGE UP (ref 3.3–5.2)
ALP SERPL-CCNC: 57 U/L — SIGNIFICANT CHANGE UP (ref 40–120)
ALT FLD-CCNC: 29 U/L — SIGNIFICANT CHANGE UP
ANION GAP SERPL CALC-SCNC: 12 MMOL/L — SIGNIFICANT CHANGE UP (ref 5–17)
ANION GAP SERPL CALC-SCNC: 16 MMOL/L — SIGNIFICANT CHANGE UP (ref 5–17)
APTT BLD: 33.9 SEC — SIGNIFICANT CHANGE UP (ref 27.5–35.5)
AST SERPL-CCNC: 66 U/L — HIGH
BASE EXCESS BLDV CALC-SCNC: -0.9 MMOL/L — SIGNIFICANT CHANGE UP (ref -2–2)
BILIRUB SERPL-MCNC: 2.1 MG/DL — HIGH (ref 0.4–2)
BLD GP AB SCN SERPL QL: SIGNIFICANT CHANGE UP
BLOOD GAS COMMENTS, VENOUS: SIGNIFICANT CHANGE UP
BUN SERPL-MCNC: 15 MG/DL — SIGNIFICANT CHANGE UP (ref 8–20)
BUN SERPL-MCNC: 15 MG/DL — SIGNIFICANT CHANGE UP (ref 8–20)
CALCIUM SERPL-MCNC: 7.7 MG/DL — LOW (ref 8.6–10.2)
CALCIUM SERPL-MCNC: 9.2 MG/DL — SIGNIFICANT CHANGE UP (ref 8.6–10.2)
CHLORIDE SERPL-SCNC: 105 MMOL/L — SIGNIFICANT CHANGE UP (ref 98–107)
CHLORIDE SERPL-SCNC: 99 MMOL/L — SIGNIFICANT CHANGE UP (ref 98–107)
CO2 SERPL-SCNC: 21 MMOL/L — LOW (ref 22–29)
CO2 SERPL-SCNC: 24 MMOL/L — SIGNIFICANT CHANGE UP (ref 22–29)
CREAT SERPL-MCNC: 0.68 MG/DL — SIGNIFICANT CHANGE UP (ref 0.5–1.3)
CREAT SERPL-MCNC: 0.85 MG/DL — SIGNIFICANT CHANGE UP (ref 0.5–1.3)
GAS PNL BLDA: SIGNIFICANT CHANGE UP
GAS PNL BLDV: SIGNIFICANT CHANGE UP
GLUCOSE BLDC GLUCOMTR-MCNC: 181 MG/DL — HIGH (ref 70–99)
GLUCOSE BLDC GLUCOMTR-MCNC: 205 MG/DL — HIGH (ref 70–99)
GLUCOSE BLDC GLUCOMTR-MCNC: 208 MG/DL — HIGH (ref 70–99)
GLUCOSE BLDC GLUCOMTR-MCNC: 226 MG/DL — HIGH (ref 70–99)
GLUCOSE SERPL-MCNC: 102 MG/DL — HIGH (ref 70–99)
GLUCOSE SERPL-MCNC: 192 MG/DL — HIGH (ref 70–99)
HCO3 BLDV-SCNC: 23 MMOL/L — SIGNIFICANT CHANGE UP (ref 21–29)
HCT VFR BLD CALC: 35.7 % — LOW (ref 39–50)
HCT VFR BLD CALC: 41.9 % — SIGNIFICANT CHANGE UP (ref 39–50)
HGB BLD-MCNC: 11.8 G/DL — LOW (ref 13–17)
HGB BLD-MCNC: 13.3 G/DL — SIGNIFICANT CHANGE UP (ref 13–17)
HOROWITZ INDEX BLDV+IHG-RTO: 0.9 — SIGNIFICANT CHANGE UP
INR BLD: 1.42 RATIO — HIGH (ref 0.88–1.16)
MAGNESIUM SERPL-MCNC: 2 MG/DL — SIGNIFICANT CHANGE UP (ref 1.6–2.6)
MAGNESIUM SERPL-MCNC: 2.6 MG/DL — SIGNIFICANT CHANGE UP (ref 1.8–2.6)
MCHC RBC-ENTMCNC: 27.5 PG — SIGNIFICANT CHANGE UP (ref 27–34)
MCHC RBC-ENTMCNC: 28 PG — SIGNIFICANT CHANGE UP (ref 27–34)
MCHC RBC-ENTMCNC: 31.7 GM/DL — LOW (ref 32–36)
MCHC RBC-ENTMCNC: 33.1 GM/DL — SIGNIFICANT CHANGE UP (ref 32–36)
MCV RBC AUTO: 84.8 FL — SIGNIFICANT CHANGE UP (ref 80–100)
MCV RBC AUTO: 86.6 FL — SIGNIFICANT CHANGE UP (ref 80–100)
PA ADP PRP-ACNC: 197 PRU — SIGNIFICANT CHANGE UP (ref 180–376)
PCO2 BLDV: 41 MMHG — SIGNIFICANT CHANGE UP (ref 35–50)
PH BLDV: 7.38 — SIGNIFICANT CHANGE UP (ref 7.32–7.43)
PLATELET # BLD AUTO: 265 K/UL — SIGNIFICANT CHANGE UP (ref 150–400)
PLATELET # BLD AUTO: 296 K/UL — SIGNIFICANT CHANGE UP (ref 150–400)
PO2 BLDV: 31 MMHG — SIGNIFICANT CHANGE UP (ref 25–45)
POTASSIUM SERPL-MCNC: 4.3 MMOL/L — SIGNIFICANT CHANGE UP (ref 3.5–5.3)
POTASSIUM SERPL-MCNC: 4.9 MMOL/L — SIGNIFICANT CHANGE UP (ref 3.5–5.3)
POTASSIUM SERPL-SCNC: 4.3 MMOL/L — SIGNIFICANT CHANGE UP (ref 3.5–5.3)
POTASSIUM SERPL-SCNC: 4.9 MMOL/L — SIGNIFICANT CHANGE UP (ref 3.5–5.3)
PROT SERPL-MCNC: 5.5 G/DL — LOW (ref 6.6–8.7)
PROTHROM AB SERPL-ACNC: 16.2 SEC — HIGH (ref 10.6–13.6)
RBC # BLD: 4.21 M/UL — SIGNIFICANT CHANGE UP (ref 4.2–5.8)
RBC # BLD: 4.84 M/UL — SIGNIFICANT CHANGE UP (ref 4.2–5.8)
RBC # FLD: 14.1 % — SIGNIFICANT CHANGE UP (ref 10.3–14.5)
RBC # FLD: 14.4 % — SIGNIFICANT CHANGE UP (ref 10.3–14.5)
SAO2 % BLDV: 56 % — SIGNIFICANT CHANGE UP
SODIUM SERPL-SCNC: 135 MMOL/L — SIGNIFICANT CHANGE UP (ref 135–145)
SODIUM SERPL-SCNC: 142 MMOL/L — SIGNIFICANT CHANGE UP (ref 135–145)
WBC # BLD: 10.74 K/UL — HIGH (ref 3.8–10.5)
WBC # BLD: 32.93 K/UL — HIGH (ref 3.8–10.5)
WBC # FLD AUTO: 10.74 K/UL — HIGH (ref 3.8–10.5)
WBC # FLD AUTO: 32.93 K/UL — HIGH (ref 3.8–10.5)

## 2020-08-28 PROCEDURE — 33533 CABG ARTERIAL SINGLE: CPT | Mod: AS

## 2020-08-28 PROCEDURE — 33521 CABG ARTERY-VEIN FOUR: CPT

## 2020-08-28 PROCEDURE — 71045 X-RAY EXAM CHEST 1 VIEW: CPT | Mod: 26

## 2020-08-28 PROCEDURE — 93010 ELECTROCARDIOGRAM REPORT: CPT

## 2020-08-28 PROCEDURE — 33508 ENDOSCOPIC VEIN HARVEST: CPT

## 2020-08-28 PROCEDURE — 33521 CABG ARTERY-VEIN FOUR: CPT | Mod: AS

## 2020-08-28 PROCEDURE — 33533 CABG ARTERIAL SINGLE: CPT

## 2020-08-28 PROCEDURE — 99232 SBSQ HOSP IP/OBS MODERATE 35: CPT | Mod: 25

## 2020-08-28 RX ORDER — SENNA PLUS 8.6 MG/1
2 TABLET ORAL AT BEDTIME
Refills: 0 | Status: DISCONTINUED | OUTPATIENT
Start: 2020-08-28 | End: 2020-09-03

## 2020-08-28 RX ORDER — CEFUROXIME AXETIL 250 MG
1500 TABLET ORAL EVERY 8 HOURS
Refills: 0 | Status: COMPLETED | OUTPATIENT
Start: 2020-08-28 | End: 2020-08-30

## 2020-08-28 RX ORDER — PANTOPRAZOLE SODIUM 20 MG/1
40 TABLET, DELAYED RELEASE ORAL DAILY
Refills: 0 | Status: DISCONTINUED | OUTPATIENT
Start: 2020-08-29 | End: 2020-09-03

## 2020-08-28 RX ORDER — POTASSIUM CHLORIDE 20 MEQ
10 PACKET (EA) ORAL
Refills: 0 | Status: DISCONTINUED | OUTPATIENT
Start: 2020-08-28 | End: 2020-08-29

## 2020-08-28 RX ORDER — DEXTROSE 50 % IN WATER 50 %
25 SYRINGE (ML) INTRAVENOUS
Refills: 0 | Status: DISCONTINUED | OUTPATIENT
Start: 2020-08-28 | End: 2020-08-30

## 2020-08-28 RX ORDER — LIDOCAINE 4 G/100G
2 CREAM TOPICAL DAILY
Refills: 0 | Status: DISCONTINUED | OUTPATIENT
Start: 2020-08-29 | End: 2020-09-02

## 2020-08-28 RX ORDER — PANTOPRAZOLE SODIUM 20 MG/1
40 TABLET, DELAYED RELEASE ORAL ONCE
Refills: 0 | Status: COMPLETED | OUTPATIENT
Start: 2020-08-28 | End: 2020-08-28

## 2020-08-28 RX ORDER — ASPIRIN/CALCIUM CARB/MAGNESIUM 324 MG
81 TABLET ORAL ONCE
Refills: 0 | Status: COMPLETED | OUTPATIENT
Start: 2020-08-28 | End: 2020-08-28

## 2020-08-28 RX ORDER — CHLORHEXIDINE GLUCONATE 213 G/1000ML
5 SOLUTION TOPICAL EVERY 4 HOURS
Refills: 0 | Status: DISCONTINUED | OUTPATIENT
Start: 2020-08-28 | End: 2020-08-29

## 2020-08-28 RX ORDER — CLOPIDOGREL BISULFATE 75 MG/1
75 TABLET, FILM COATED ORAL DAILY
Refills: 0 | Status: DISCONTINUED | OUTPATIENT
Start: 2020-08-29 | End: 2020-09-03

## 2020-08-28 RX ORDER — DEXTROSE 50 % IN WATER 50 %
50 SYRINGE (ML) INTRAVENOUS
Refills: 0 | Status: DISCONTINUED | OUTPATIENT
Start: 2020-08-28 | End: 2020-08-30

## 2020-08-28 RX ORDER — IPRATROPIUM/ALBUTEROL SULFATE 18-103MCG
3 AEROSOL WITH ADAPTER (GRAM) INHALATION EVERY 6 HOURS
Refills: 0 | Status: DISCONTINUED | OUTPATIENT
Start: 2020-08-29 | End: 2020-09-03

## 2020-08-28 RX ORDER — VANCOMYCIN HCL 1 G
1500 VIAL (EA) INTRAVENOUS EVERY 12 HOURS
Refills: 0 | Status: COMPLETED | OUTPATIENT
Start: 2020-08-28 | End: 2020-08-30

## 2020-08-28 RX ORDER — ATORVASTATIN CALCIUM 80 MG/1
80 TABLET, FILM COATED ORAL AT BEDTIME
Refills: 0 | Status: DISCONTINUED | OUTPATIENT
Start: 2020-08-28 | End: 2020-09-03

## 2020-08-28 RX ORDER — NOREPINEPHRINE BITARTRATE/D5W 8 MG/250ML
0.05 PLASTIC BAG, INJECTION (ML) INTRAVENOUS
Qty: 8 | Refills: 0 | Status: DISCONTINUED | OUTPATIENT
Start: 2020-08-28 | End: 2020-08-29

## 2020-08-28 RX ORDER — FENTANYL CITRATE 50 UG/ML
50 INJECTION INTRAVENOUS
Refills: 0 | Status: DISCONTINUED | OUTPATIENT
Start: 2020-08-28 | End: 2020-08-28

## 2020-08-28 RX ORDER — ONDANSETRON 8 MG/1
4 TABLET, FILM COATED ORAL EVERY 4 HOURS
Refills: 0 | Status: DISCONTINUED | OUTPATIENT
Start: 2020-08-28 | End: 2020-08-30

## 2020-08-28 RX ORDER — IPRATROPIUM/ALBUTEROL SULFATE 18-103MCG
3 AEROSOL WITH ADAPTER (GRAM) INHALATION ONCE
Refills: 0 | Status: COMPLETED | OUTPATIENT
Start: 2020-08-28 | End: 2020-08-28

## 2020-08-28 RX ORDER — INSULIN HUMAN 100 [IU]/ML
2 INJECTION, SOLUTION SUBCUTANEOUS
Qty: 50 | Refills: 0 | Status: DISCONTINUED | OUTPATIENT
Start: 2020-08-28 | End: 2020-08-29

## 2020-08-28 RX ORDER — ASPIRIN/CALCIUM CARB/MAGNESIUM 324 MG
81 TABLET ORAL DAILY
Refills: 0 | Status: DISCONTINUED | OUTPATIENT
Start: 2020-08-29 | End: 2020-09-03

## 2020-08-28 RX ORDER — FENTANYL CITRATE 50 UG/ML
100 INJECTION INTRAVENOUS ONCE
Refills: 0 | Status: DISCONTINUED | OUTPATIENT
Start: 2020-08-28 | End: 2020-08-28

## 2020-08-28 RX ORDER — ALBUMIN HUMAN 25 %
250 VIAL (ML) INTRAVENOUS
Refills: 0 | Status: DISCONTINUED | OUTPATIENT
Start: 2020-08-28 | End: 2020-08-28

## 2020-08-28 RX ORDER — SODIUM CHLORIDE 9 MG/ML
500 INJECTION, SOLUTION INTRAVENOUS
Refills: 0 | Status: DISCONTINUED | OUTPATIENT
Start: 2020-08-28 | End: 2020-08-28

## 2020-08-28 RX ORDER — CHLORHEXIDINE GLUCONATE 213 G/1000ML
1 SOLUTION TOPICAL DAILY
Refills: 0 | Status: DISCONTINUED | OUTPATIENT
Start: 2020-08-28 | End: 2020-08-30

## 2020-08-28 RX ORDER — IPRATROPIUM/ALBUTEROL SULFATE 18-103MCG
3 AEROSOL WITH ADAPTER (GRAM) INHALATION EVERY 4 HOURS
Refills: 0 | Status: COMPLETED | OUTPATIENT
Start: 2020-08-28 | End: 2020-08-29

## 2020-08-28 RX ORDER — SODIUM CHLORIDE 9 MG/ML
1000 INJECTION INTRAMUSCULAR; INTRAVENOUS; SUBCUTANEOUS
Refills: 0 | Status: DISCONTINUED | OUTPATIENT
Start: 2020-08-28 | End: 2020-08-30

## 2020-08-28 RX ORDER — POLYETHYLENE GLYCOL 3350 17 G/17G
17 POWDER, FOR SOLUTION ORAL DAILY
Refills: 0 | Status: DISCONTINUED | OUTPATIENT
Start: 2020-08-30 | End: 2020-09-03

## 2020-08-28 RX ORDER — DEXMEDETOMIDINE HYDROCHLORIDE IN 0.9% SODIUM CHLORIDE 4 UG/ML
0.5 INJECTION INTRAVENOUS
Qty: 200 | Refills: 0 | Status: DISCONTINUED | OUTPATIENT
Start: 2020-08-28 | End: 2020-08-29

## 2020-08-28 RX ORDER — PROPOFOL 10 MG/ML
10 INJECTION, EMULSION INTRAVENOUS
Qty: 1000 | Refills: 0 | Status: DISCONTINUED | OUTPATIENT
Start: 2020-08-28 | End: 2020-08-29

## 2020-08-28 RX ORDER — VANCOMYCIN HCL 1 G
2000 VIAL (EA) INTRAVENOUS EVERY 12 HOURS
Refills: 0 | Status: DISCONTINUED | OUTPATIENT
Start: 2020-08-28 | End: 2020-08-28

## 2020-08-28 RX ORDER — EPINEPHRINE 0.3 MG/.3ML
0.01 INJECTION INTRAMUSCULAR; SUBCUTANEOUS
Qty: 4 | Refills: 0 | Status: DISCONTINUED | OUTPATIENT
Start: 2020-08-28 | End: 2020-08-29

## 2020-08-28 RX ADMIN — Medication 3 MILLILITER(S): at 17:48

## 2020-08-28 RX ADMIN — FENTANYL CITRATE 100 MICROGRAM(S): 50 INJECTION INTRAVENOUS at 17:30

## 2020-08-28 RX ADMIN — SENNA PLUS 2 TABLET(S): 8.6 TABLET ORAL at 22:43

## 2020-08-28 RX ADMIN — FENTANYL CITRATE 100 MICROGRAM(S): 50 INJECTION INTRAVENOUS at 17:15

## 2020-08-28 RX ADMIN — PANTOPRAZOLE SODIUM 40 MILLIGRAM(S): 20 TABLET, DELAYED RELEASE ORAL at 17:51

## 2020-08-28 RX ADMIN — ATORVASTATIN CALCIUM 80 MILLIGRAM(S): 80 TABLET, FILM COATED ORAL at 22:43

## 2020-08-28 RX ADMIN — Medication 100 MILLIGRAM(S): at 22:53

## 2020-08-28 RX ADMIN — CHLORHEXIDINE GLUCONATE 5 MILLILITER(S): 213 SOLUTION TOPICAL at 17:51

## 2020-08-28 RX ADMIN — Medication 50 MILLIGRAM(S): at 05:02

## 2020-08-28 RX ADMIN — CHLORHEXIDINE GLUCONATE 5 MILLILITER(S): 213 SOLUTION TOPICAL at 22:43

## 2020-08-28 RX ADMIN — Medication 300 MILLIGRAM(S): at 21:15

## 2020-08-28 RX ADMIN — Medication 3 MILLILITER(S): at 23:26

## 2020-08-28 RX ADMIN — DEXMEDETOMIDINE HYDROCHLORIDE IN 0.9% SODIUM CHLORIDE 17.3 MICROGRAM(S)/KG/HR: 4 INJECTION INTRAVENOUS at 17:48

## 2020-08-28 RX ADMIN — Medication 3 MILLILITER(S): at 20:23

## 2020-08-28 RX ADMIN — Medication 81 MILLIGRAM(S): at 22:43

## 2020-08-28 RX ADMIN — Medication 100 MILLIGRAM(S): at 17:51

## 2020-08-28 RX ADMIN — CHLORHEXIDINE GLUCONATE 1 APPLICATION(S): 213 SOLUTION TOPICAL at 17:51

## 2020-08-28 RX ADMIN — SODIUM CHLORIDE 3 MILLILITER(S): 9 INJECTION INTRAMUSCULAR; INTRAVENOUS; SUBCUTANEOUS at 05:04

## 2020-08-28 RX ADMIN — AMLODIPINE BESYLATE 2.5 MILLIGRAM(S): 2.5 TABLET ORAL at 05:02

## 2020-08-28 NOTE — BRIEF OPERATIVE NOTE - NSICDXBRIEFPROCEDURE_GEN_ALL_CORE_FT
PROCEDURES:  CABG, with CLARICE 28-Aug-2020 15:56:46 C5 L ( SVG-RCA, SVG-RAMUS. SVG-OM3, SVG- Diag) Raheem Carmona

## 2020-08-28 NOTE — PROGRESS NOTE ADULT - SUBJECTIVE AND OBJECTIVE BOX
Significant recent/past 24 hr events:    Subjective:    Review of Systems  Denies any complaints       Patient is a 35y old  Male who presents with a chief complaint of chest pain and shortness of breath (27 Aug 2020 15:43)    HPI:  35 year old male, PMH of GERD, HTN, active smoker for 12 years (1/2 PPD), family history of MI (father at 34yo), [place] [facility] [place] with shortness of breath for two days with frothy sputum. Patient had chest pain, with arm pain on his left. CXR showed what looked like pulmonary edema. Patient was given lopressor, lasix, and was started on nitro gtt. ST depressions noted on EKG. Patient taken to the Cath lab and was found to have EF of 30-40%, proximal LAD disease, ostial LAD, and proximal Circ. Patient had a right groin balloon pump inserted. Patient was brought to [place] for further management. (25 Aug 2020 00:01)    PAST MEDICAL & SURGICAL HISTORY:  History of ear surgery  Abdominal hernia    FAMILY HISTORY:      Vitals   ICU Vital Signs Last 24 Hrs  T(C): 36.8 (27 Aug 2020 19:22), Max: 37.4 (27 Aug 2020 13:00)  T(F): 98.2 (27 Aug 2020 19:22), Max: 99.3 (27 Aug 2020 13:00)  HR: 69 (28 Aug 2020 01:00) (69 - 96)  BP: 128/78 (28 Aug 2020 01:00) (128/78 - 173/88)  BP(mean): 97 (28 Aug 2020 01:00) (92 - 124)  ABP: --  ABP(mean): --  RR: 16 (28 Aug 2020 01:00) (16 - 23)  SpO2: 97% (28 Aug 2020 01:00) (94% - 99%)      VENT SETTINGS         I&O's Detail    26 Aug 2020 07:01  -  27 Aug 2020 07:00  --------------------------------------------------------  IN:    Oral Fluid: 840 mL  Total IN: 840 mL    OUT:    Indwelling Catheter - Urethral: 725 mL    Voided: 1175 mL  Total OUT: 1900 mL    Total NET: -1060 mL      27 Aug 2020 07:01  -  28 Aug 2020 02:31  --------------------------------------------------------  IN:    Oral Fluid: 1080 mL  Total IN: 1080 mL    OUT:    Voided: 2250 mL  Total OUT: 2250 mL    Total NET: -1170 mL          LABS                        13.3   12.52 )-----------( 288      ( 27 Aug 2020 02:43 )             42.2     08-27    138  |  102  |  15.0  ----------------------------<  105<H>  4.5   |  25.0  |  0.88    Ca    9.2      27 Aug 2020 02:43  Mg     2.1     08-27                      MEDICATIONS  (STANDING):  amLODIPine   Tablet 2.5 milliGRAM(s) Oral daily  aspirin enteric coated 81 milliGRAM(s) Oral daily  atorvastatin 40 milliGRAM(s) Oral at bedtime  cefuroxime  IVPB 1500 milliGRAM(s) IV Intermittent once  hemorrhoidal Ointment 1 Application(s) Rectal two times a day  metoprolol tartrate 50 milliGRAM(s) Oral every 6 hours  polyethylene glycol 3350 17 Gram(s) Oral daily  senna 2 Tablet(s) Oral at bedtime  sodium chloride 0.9% lock flush 3 milliLiter(s) IV Push every 8 hours  vancomycin  IVPB 1500 milliGRAM(s) IV Intermittent once    MEDICATIONS  (PRN):  bisacodyl 5 milliGRAM(s) Oral every 12 hours PRN Constipation  morphine  - Injectable 2 milliGRAM(s) IV Push every 4 hours PRN Moderate Pain (4 - 6)  nitroglycerin     SubLingual 0.4 milliGRAM(s) SubLingual every 5 minutes PRN Chest Pain    Allergies:  No Known Allergies      Physical Exam:   Constitutional: NAD, well-groomed, well-developed  HEENT: PERRLA, EOMI, no drainage or redness  Neck: supple,  No JVD  Respiratory: Breath Sounds equal & clear bilaterally to auscultation, no rales/rhonchi/wheezing, no accessory muscle use noted  Cardiovascular: Regular rate, regular rhythm, normal S1, S2; no murmurs or rub  Gastrointestinal: Soft, non-tender, non distended, + bowel sounds  Extremities: LINDSEY x 4, no peripheral edema, no cyanosis, no clubbing   Neurological: A+O x 3; speech clear and intact; no sensory, motor  deficits, normal reflexes  Skin: warm, dry, well perfused Significant recent/past 24 hr events: patient is resting comfortably at this time, no other issues overnight     Subjective: no complaints at this time     Review of Systems  Denies any complaints       Patient is a 35y old  Male who presents with a chief complaint of chest pain and shortness of breath (27 Aug 2020 15:43)    HPI:  35 year old male, PMH of GERD, HTN, active smoker for 12 years (1/2 PPD), family history of MI (father at 34yo), [place] [facility] [place] with shortness of breath for two days with frothy sputum. Patient had chest pain, with arm pain on his left. CXR showed what looked like pulmonary edema. Patient was given lopressor, lasix, and was started on nitro gtt. ST depressions noted on EKG. Patient taken to the Cath lab and was found to have EF of 30-40%, proximal LAD disease, ostial LAD, and proximal Circ. Patient had a right groin balloon pump inserted. Patient was brought to [place] for further management. (25 Aug 2020 00:01)    PAST MEDICAL & SURGICAL HISTORY:  History of ear surgery  Abdominal hernia    FAMILY HISTORY:      Vitals   ICU Vital Signs Last 24 Hrs  T(C): 36.8 (27 Aug 2020 19:22), Max: 37.4 (27 Aug 2020 13:00)  T(F): 98.2 (27 Aug 2020 19:22), Max: 99.3 (27 Aug 2020 13:00)  HR: 69 (28 Aug 2020 01:00) (69 - 96)  BP: 128/78 (28 Aug 2020 01:00) (128/78 - 173/88)  BP(mean): 97 (28 Aug 2020 01:00) (92 - 124)  ABP: --  ABP(mean): --  RR: 16 (28 Aug 2020 01:00) (16 - 23)  SpO2: 97% (28 Aug 2020 01:00) (94% - 99%)      VENT SETTINGS         I&O's Detail    26 Aug 2020 07:01  -  27 Aug 2020 07:00  --------------------------------------------------------  IN:    Oral Fluid: 840 mL  Total IN: 840 mL    OUT:    Indwelling Catheter - Urethral: 725 mL    Voided: 1175 mL  Total OUT: 1900 mL    Total NET: -1060 mL      27 Aug 2020 07:01  -  28 Aug 2020 02:31  --------------------------------------------------------  IN:    Oral Fluid: 1080 mL  Total IN: 1080 mL    OUT:    Voided: 2250 mL  Total OUT: 2250 mL    Total NET: -1170 mL          LABS                        13.3   12.52 )-----------( 288      ( 27 Aug 2020 02:43 )             42.2     08-27    138  |  102  |  15.0  ----------------------------<  105<H>  4.5   |  25.0  |  0.88    Ca    9.2      27 Aug 2020 02:43  Mg     2.1     08-27                      MEDICATIONS  (STANDING):  amLODIPine   Tablet 2.5 milliGRAM(s) Oral daily  aspirin enteric coated 81 milliGRAM(s) Oral daily  atorvastatin 40 milliGRAM(s) Oral at bedtime  cefuroxime  IVPB 1500 milliGRAM(s) IV Intermittent once  hemorrhoidal Ointment 1 Application(s) Rectal two times a day  metoprolol tartrate 50 milliGRAM(s) Oral every 6 hours  polyethylene glycol 3350 17 Gram(s) Oral daily  senna 2 Tablet(s) Oral at bedtime  sodium chloride 0.9% lock flush 3 milliLiter(s) IV Push every 8 hours  vancomycin  IVPB 1500 milliGRAM(s) IV Intermittent once    MEDICATIONS  (PRN):  bisacodyl 5 milliGRAM(s) Oral every 12 hours PRN Constipation  morphine  - Injectable 2 milliGRAM(s) IV Push every 4 hours PRN Moderate Pain (4 - 6)  nitroglycerin     SubLingual 0.4 milliGRAM(s) SubLingual every 5 minutes PRN Chest Pain    Allergies:  No Known Allergies      Physical Exam:   Constitutional: NAD, well-groomed, well-developed  HEENT: PERRLA, EOMI, no drainage or redness  Neck: supple,  No JVD  Respiratory: Breath Sounds equal & clear bilaterally to auscultation, no rales/rhonchi/wheezing, no accessory muscle use noted  Cardiovascular: Regular rate, regular rhythm, normal S1, S2; no murmurs or rub  Gastrointestinal: Soft, non-tender, non distended, + bowel sounds  Extremities: LINDSEY x 4, no peripheral edema, no cyanosis, no clubbing   Neurological: A+O x 3; speech clear and intact; no sensory, motor  deficits, normal reflexes  Skin: warm, dry, well perfused

## 2020-08-28 NOTE — BRIEF OPERATIVE NOTE - COMMENTS
No qualified resident was available to assist in this case. I have personally first assisted the Cardiothoracic Surgeon listed in this brief op note throughout the entirety of this case. No qualified resident was available to assist in this case. I have personally first assisted the Cardiothoracic Surgeon listed in this brief op note throughout the entirety of this case.    aortic cross clamp time 103 minutes

## 2020-08-28 NOTE — PROGRESS NOTE ADULT - ASSESSMENT
35 year old male, PMH of GERD, HTN, active smoker for 12 years (1/2 PPD), family history of MI (father at 36yo), [place] [facility] [place] with shortness of breath for two days with frothy sputum. Patient had chest pain, with arm pain on his left. CXR showed what looked like pulmonary edema. Patient was given lopressor, lasix, and was started on nitro gtt. ST depressions noted on EKG. Patient taken to the Cath lab and was found to have EF of 30-40%, proximal LAD disease, ostial LAD, and proximal Circ. Patient had a right groin balloon pump inserted. Patient was brought to [place] for further management.

## 2020-08-28 NOTE — BRIEF OPERATIVE NOTE - ANTIBIOTIC PROTOCOL
Followed protocol Consent (Scalp)/Introductory Paragraph: The rationale for Mohs was explained to the patient and consent was obtained. The risks, benefits and alternatives to therapy were discussed in detail. Specifically, the risks of changes in hair growth pattern secondary to repair, infection, scarring, bleeding, prolonged wound healing, incomplete removal, allergy to anesthesia, nerve injury and recurrence were addressed. Prior to the procedure, the treatment site was clearly identified and confirmed by the patient. All components of Universal Protocol/PAUSE Rule completed.

## 2020-08-28 NOTE — AIRWAY REMOVAL NOTE  ADULT & PEDS - ARTIFICAL AIRWAY REMOVAL COMMENTS
patient was extubated post ABG. PA put in orders, nurse was at bedside. Patient was extuabted and placed on Bipap settigns of 12/6 100% patient was not maintaining sats on aersol mask. Pa aware and is to put in orders for bipap. patient is resting and  hr 94 sats 92% . patient is in no distress and is resting at this time. will continue to monitor

## 2020-08-28 NOTE — BRIEF OPERATIVE NOTE - NS MD BRIEF OPTUBE MEDIASTINAL NUM
Department of Anesthesiology  Postprocedure Note    Patient: Mikey Leyden  MRN: [id number]  YOB: 1969  Date of evaluation: 6/19/2019  Time:  9:25 AM     Procedure Summary     Date:  06/19/19 Room / Location:  [address] [place] [address] 01 / 1200 [facility] ENDOSCOPY    Anesthesia Start:  7772 Anesthesia Stop:  6377    Procedure:  EGD DIAGNOSTIC ONLY (N/A ) Diagnosis:  (-)    Surgeon:  Maria Fernanda Lou MD Responsible Provider:  Tamiko Ahuja MD    Anesthesia Type:  TIVA, MAC ASA Status:  3          Anesthesia Type: TIVA, MAC    Anay Phase I: Anay Score: 8    Anay Phase II:      Last vitals: Reviewed and per EMR flowsheets.        Anesthesia Post Evaluation    Patient location during evaluation: bedside  Patient participation: complete - patient participated  Level of consciousness: sleepy but conscious  Pain score: 0  Airway patency: patent  Nausea & Vomiting: no nausea and no vomiting  Complications: no  Cardiovascular status: blood pressure returned to baseline and hemodynamically stable  Respiratory status: acceptable, room air and spontaneous ventilation  Hydration status: euvolemic
2

## 2020-08-29 LAB
ALBUMIN SERPL ELPH-MCNC: 3.2 G/DL — LOW (ref 3.3–5.2)
ALP SERPL-CCNC: 47 U/L — SIGNIFICANT CHANGE UP (ref 40–120)
ALT FLD-CCNC: 38 U/L — SIGNIFICANT CHANGE UP
ANION GAP SERPL CALC-SCNC: 10 MMOL/L — SIGNIFICANT CHANGE UP (ref 5–17)
ANION GAP SERPL CALC-SCNC: 11 MMOL/L — SIGNIFICANT CHANGE UP (ref 5–17)
AST SERPL-CCNC: 184 U/L — HIGH
BILIRUB SERPL-MCNC: 0.6 MG/DL — SIGNIFICANT CHANGE UP (ref 0.4–2)
BUN SERPL-MCNC: 18 MG/DL — SIGNIFICANT CHANGE UP (ref 8–20)
BUN SERPL-MCNC: 22 MG/DL — HIGH (ref 8–20)
CALCIUM SERPL-MCNC: 7.5 MG/DL — LOW (ref 8.6–10.2)
CALCIUM SERPL-MCNC: 8.2 MG/DL — LOW (ref 8.6–10.2)
CHLORIDE SERPL-SCNC: 107 MMOL/L — SIGNIFICANT CHANGE UP (ref 98–107)
CHLORIDE SERPL-SCNC: 109 MMOL/L — HIGH (ref 98–107)
CO2 SERPL-SCNC: 25 MMOL/L — SIGNIFICANT CHANGE UP (ref 22–29)
CO2 SERPL-SCNC: 25 MMOL/L — SIGNIFICANT CHANGE UP (ref 22–29)
CREAT SERPL-MCNC: 1.02 MG/DL — SIGNIFICANT CHANGE UP (ref 0.5–1.3)
CREAT SERPL-MCNC: 1.18 MG/DL — SIGNIFICANT CHANGE UP (ref 0.5–1.3)
GAS PNL BLDA: SIGNIFICANT CHANGE UP
GAS PNL BLDA: SIGNIFICANT CHANGE UP
GLUCOSE BLDC GLUCOMTR-MCNC: 132 MG/DL — HIGH (ref 70–99)
GLUCOSE BLDC GLUCOMTR-MCNC: 140 MG/DL — HIGH (ref 70–99)
GLUCOSE BLDC GLUCOMTR-MCNC: 144 MG/DL — HIGH (ref 70–99)
GLUCOSE BLDC GLUCOMTR-MCNC: 144 MG/DL — HIGH (ref 70–99)
GLUCOSE BLDC GLUCOMTR-MCNC: 146 MG/DL — HIGH (ref 70–99)
GLUCOSE BLDC GLUCOMTR-MCNC: 150 MG/DL — HIGH (ref 70–99)
GLUCOSE BLDC GLUCOMTR-MCNC: 153 MG/DL — HIGH (ref 70–99)
GLUCOSE BLDC GLUCOMTR-MCNC: 160 MG/DL — HIGH (ref 70–99)
GLUCOSE BLDC GLUCOMTR-MCNC: 160 MG/DL — HIGH (ref 70–99)
GLUCOSE BLDC GLUCOMTR-MCNC: 166 MG/DL — HIGH (ref 70–99)
GLUCOSE SERPL-MCNC: 150 MG/DL — HIGH (ref 70–99)
GLUCOSE SERPL-MCNC: 170 MG/DL — HIGH (ref 70–99)
HCT VFR BLD CALC: 32.7 % — LOW (ref 39–50)
HGB BLD-MCNC: 10.6 G/DL — LOW (ref 13–17)
MAGNESIUM SERPL-MCNC: 2.3 MG/DL — SIGNIFICANT CHANGE UP (ref 1.6–2.6)
MCHC RBC-ENTMCNC: 28 PG — SIGNIFICANT CHANGE UP (ref 27–34)
MCHC RBC-ENTMCNC: 32.4 GM/DL — SIGNIFICANT CHANGE UP (ref 32–36)
MCV RBC AUTO: 86.5 FL — SIGNIFICANT CHANGE UP (ref 80–100)
PLATELET # BLD AUTO: 252 K/UL — SIGNIFICANT CHANGE UP (ref 150–400)
POTASSIUM SERPL-MCNC: 4.4 MMOL/L — SIGNIFICANT CHANGE UP (ref 3.5–5.3)
POTASSIUM SERPL-MCNC: 5.5 MMOL/L — HIGH (ref 3.5–5.3)
POTASSIUM SERPL-SCNC: 4.4 MMOL/L — SIGNIFICANT CHANGE UP (ref 3.5–5.3)
POTASSIUM SERPL-SCNC: 5.5 MMOL/L — HIGH (ref 3.5–5.3)
PROT SERPL-MCNC: 5.4 G/DL — LOW (ref 6.6–8.7)
RBC # BLD: 3.78 M/UL — LOW (ref 4.2–5.8)
RBC # FLD: 14.8 % — HIGH (ref 10.3–14.5)
SODIUM SERPL-SCNC: 141 MMOL/L — SIGNIFICANT CHANGE UP (ref 135–145)
SODIUM SERPL-SCNC: 145 MMOL/L — SIGNIFICANT CHANGE UP (ref 135–145)
WBC # BLD: 13.95 K/UL — HIGH (ref 3.8–10.5)
WBC # FLD AUTO: 13.95 K/UL — HIGH (ref 3.8–10.5)

## 2020-08-29 PROCEDURE — 99232 SBSQ HOSP IP/OBS MODERATE 35: CPT

## 2020-08-29 PROCEDURE — 93010 ELECTROCARDIOGRAM REPORT: CPT

## 2020-08-29 PROCEDURE — 71045 X-RAY EXAM CHEST 1 VIEW: CPT | Mod: 26

## 2020-08-29 RX ORDER — HYDROMORPHONE HYDROCHLORIDE 2 MG/ML
0.25 INJECTION INTRAMUSCULAR; INTRAVENOUS; SUBCUTANEOUS ONCE
Refills: 0 | Status: DISCONTINUED | OUTPATIENT
Start: 2020-08-29 | End: 2020-08-29

## 2020-08-29 RX ORDER — KETOROLAC TROMETHAMINE 30 MG/ML
15 SYRINGE (ML) INJECTION ONCE
Refills: 0 | Status: DISCONTINUED | OUTPATIENT
Start: 2020-08-29 | End: 2020-08-29

## 2020-08-29 RX ORDER — ENOXAPARIN SODIUM 100 MG/ML
40 INJECTION SUBCUTANEOUS EVERY 12 HOURS
Refills: 0 | Status: DISCONTINUED | OUTPATIENT
Start: 2020-08-29 | End: 2020-09-03

## 2020-08-29 RX ORDER — HYDROMORPHONE HYDROCHLORIDE 2 MG/ML
0.5 INJECTION INTRAMUSCULAR; INTRAVENOUS; SUBCUTANEOUS ONCE
Refills: 0 | Status: DISCONTINUED | OUTPATIENT
Start: 2020-08-29 | End: 2020-08-29

## 2020-08-29 RX ORDER — ALPRAZOLAM 0.25 MG
0.25 TABLET ORAL EVERY 8 HOURS
Refills: 0 | Status: DISCONTINUED | OUTPATIENT
Start: 2020-08-29 | End: 2020-08-30

## 2020-08-29 RX ORDER — ENOXAPARIN SODIUM 100 MG/ML
40 INJECTION SUBCUTANEOUS DAILY
Refills: 0 | Status: DISCONTINUED | OUTPATIENT
Start: 2020-08-29 | End: 2020-08-29

## 2020-08-29 RX ORDER — INSULIN LISPRO 100/ML
VIAL (ML) SUBCUTANEOUS EVERY 6 HOURS
Refills: 0 | Status: DISCONTINUED | OUTPATIENT
Start: 2020-08-29 | End: 2020-08-30

## 2020-08-29 RX ORDER — ACETAMINOPHEN 500 MG
1000 TABLET ORAL ONCE
Refills: 0 | Status: COMPLETED | OUTPATIENT
Start: 2020-08-29 | End: 2020-08-29

## 2020-08-29 RX ORDER — METOPROLOL TARTRATE 50 MG
5 TABLET ORAL EVERY 6 HOURS
Refills: 0 | Status: DISCONTINUED | OUTPATIENT
Start: 2020-08-29 | End: 2020-08-30

## 2020-08-29 RX ORDER — GABAPENTIN 400 MG/1
300 CAPSULE ORAL EVERY 8 HOURS
Refills: 0 | Status: DISCONTINUED | OUTPATIENT
Start: 2020-08-29 | End: 2020-09-03

## 2020-08-29 RX ORDER — FUROSEMIDE 40 MG
20 TABLET ORAL EVERY 6 HOURS
Refills: 0 | Status: DISCONTINUED | OUTPATIENT
Start: 2020-08-29 | End: 2020-08-29

## 2020-08-29 RX ORDER — GLUCAGON INJECTION, SOLUTION 0.5 MG/.1ML
1 INJECTION, SOLUTION SUBCUTANEOUS ONCE
Refills: 0 | Status: DISCONTINUED | OUTPATIENT
Start: 2020-08-29 | End: 2020-08-30

## 2020-08-29 RX ORDER — SODIUM CHLORIDE 9 MG/ML
1000 INJECTION, SOLUTION INTRAVENOUS
Refills: 0 | Status: DISCONTINUED | OUTPATIENT
Start: 2020-08-29 | End: 2020-08-30

## 2020-08-29 RX ORDER — DEXMEDETOMIDINE HYDROCHLORIDE IN 0.9% SODIUM CHLORIDE 4 UG/ML
0.2 INJECTION INTRAVENOUS
Qty: 200 | Refills: 0 | Status: DISCONTINUED | OUTPATIENT
Start: 2020-08-29 | End: 2020-08-29

## 2020-08-29 RX ORDER — FUROSEMIDE 40 MG
20 TABLET ORAL EVERY 8 HOURS
Refills: 0 | Status: DISCONTINUED | OUTPATIENT
Start: 2020-08-29 | End: 2020-08-30

## 2020-08-29 RX ORDER — DEXTROSE 50 % IN WATER 50 %
15 SYRINGE (ML) INTRAVENOUS ONCE
Refills: 0 | Status: DISCONTINUED | OUTPATIENT
Start: 2020-08-29 | End: 2020-08-30

## 2020-08-29 RX ORDER — CALCIUM GLUCONATE 100 MG/ML
2 VIAL (ML) INTRAVENOUS ONCE
Refills: 0 | Status: COMPLETED | OUTPATIENT
Start: 2020-08-29 | End: 2020-08-29

## 2020-08-29 RX ADMIN — Medication 3 MILLILITER(S): at 20:39

## 2020-08-29 RX ADMIN — LIDOCAINE 2 PATCH: 4 CREAM TOPICAL at 11:40

## 2020-08-29 RX ADMIN — Medication 300 MILLIGRAM(S): at 22:40

## 2020-08-29 RX ADMIN — PANTOPRAZOLE SODIUM 40 MILLIGRAM(S): 20 TABLET, DELAYED RELEASE ORAL at 14:59

## 2020-08-29 RX ADMIN — ONDANSETRON 4 MILLIGRAM(S): 8 TABLET, FILM COATED ORAL at 21:34

## 2020-08-29 RX ADMIN — Medication 3 MILLILITER(S): at 11:44

## 2020-08-29 RX ADMIN — DEXMEDETOMIDINE HYDROCHLORIDE IN 0.9% SODIUM CHLORIDE 6.94 MICROGRAM(S)/KG/HR: 4 INJECTION INTRAVENOUS at 14:57

## 2020-08-29 RX ADMIN — Medication 300 MILLIGRAM(S): at 08:37

## 2020-08-29 RX ADMIN — HYDROMORPHONE HYDROCHLORIDE 0.5 MILLIGRAM(S): 2 INJECTION INTRAMUSCULAR; INTRAVENOUS; SUBCUTANEOUS at 16:40

## 2020-08-29 RX ADMIN — Medication 400 MILLIGRAM(S): at 23:07

## 2020-08-29 RX ADMIN — Medication 5 MILLIGRAM(S): at 18:37

## 2020-08-29 RX ADMIN — HYDROMORPHONE HYDROCHLORIDE 0.5 MILLIGRAM(S): 2 INJECTION INTRAMUSCULAR; INTRAVENOUS; SUBCUTANEOUS at 15:30

## 2020-08-29 RX ADMIN — CLOPIDOGREL BISULFATE 75 MILLIGRAM(S): 75 TABLET, FILM COATED ORAL at 14:58

## 2020-08-29 RX ADMIN — LIDOCAINE 2 PATCH: 4 CREAM TOPICAL at 19:00

## 2020-08-29 RX ADMIN — HYDROMORPHONE HYDROCHLORIDE 0.25 MILLIGRAM(S): 2 INJECTION INTRAMUSCULAR; INTRAVENOUS; SUBCUTANEOUS at 02:00

## 2020-08-29 RX ADMIN — Medication 15 MILLIGRAM(S): at 08:36

## 2020-08-29 RX ADMIN — Medication 20 MILLIGRAM(S): at 08:00

## 2020-08-29 RX ADMIN — Medication 100 MILLIGRAM(S): at 07:02

## 2020-08-29 RX ADMIN — INSULIN HUMAN 2 UNIT(S)/HR: 100 INJECTION, SOLUTION SUBCUTANEOUS at 08:38

## 2020-08-29 RX ADMIN — Medication 200 GRAM(S): at 10:49

## 2020-08-29 RX ADMIN — GABAPENTIN 300 MILLIGRAM(S): 400 CAPSULE ORAL at 21:35

## 2020-08-29 RX ADMIN — Medication 3 MILLILITER(S): at 08:28

## 2020-08-29 RX ADMIN — Medication 81 MILLIGRAM(S): at 14:58

## 2020-08-29 RX ADMIN — Medication 1000 MILLIGRAM(S): at 08:30

## 2020-08-29 RX ADMIN — HYDROMORPHONE HYDROCHLORIDE 0.5 MILLIGRAM(S): 2 INJECTION INTRAMUSCULAR; INTRAVENOUS; SUBCUTANEOUS at 20:51

## 2020-08-29 RX ADMIN — Medication 5 MILLIGRAM(S): at 11:39

## 2020-08-29 RX ADMIN — Medication 3 MILLILITER(S): at 04:49

## 2020-08-29 RX ADMIN — Medication 100 MILLIGRAM(S): at 14:58

## 2020-08-29 RX ADMIN — Medication 20 MILLIGRAM(S): at 15:07

## 2020-08-29 RX ADMIN — Medication 1000 MILLIGRAM(S): at 23:22

## 2020-08-29 RX ADMIN — Medication 15 MILLIGRAM(S): at 09:00

## 2020-08-29 RX ADMIN — Medication 100 MILLIGRAM(S): at 21:34

## 2020-08-29 RX ADMIN — SENNA PLUS 2 TABLET(S): 8.6 TABLET ORAL at 21:38

## 2020-08-29 RX ADMIN — CHLORHEXIDINE GLUCONATE 1 APPLICATION(S): 213 SOLUTION TOPICAL at 11:41

## 2020-08-29 RX ADMIN — Medication 400 MILLIGRAM(S): at 16:49

## 2020-08-29 RX ADMIN — HYDROMORPHONE HYDROCHLORIDE 0.5 MILLIGRAM(S): 2 INJECTION INTRAMUSCULAR; INTRAVENOUS; SUBCUTANEOUS at 16:00

## 2020-08-29 RX ADMIN — GABAPENTIN 300 MILLIGRAM(S): 400 CAPSULE ORAL at 14:57

## 2020-08-29 RX ADMIN — Medication 400 MILLIGRAM(S): at 08:36

## 2020-08-29 RX ADMIN — HYDROMORPHONE HYDROCHLORIDE 0.5 MILLIGRAM(S): 2 INJECTION INTRAMUSCULAR; INTRAVENOUS; SUBCUTANEOUS at 21:06

## 2020-08-29 RX ADMIN — ENOXAPARIN SODIUM 40 MILLIGRAM(S): 100 INJECTION SUBCUTANEOUS at 14:00

## 2020-08-29 RX ADMIN — ATORVASTATIN CALCIUM 80 MILLIGRAM(S): 80 TABLET, FILM COATED ORAL at 21:35

## 2020-08-29 RX ADMIN — Medication 0.25 MILLIGRAM(S): at 23:06

## 2020-08-29 RX ADMIN — DEXMEDETOMIDINE HYDROCHLORIDE IN 0.9% SODIUM CHLORIDE 6.94 MICROGRAM(S)/KG/HR: 4 INJECTION INTRAVENOUS at 11:39

## 2020-08-29 RX ADMIN — Medication 20 MILLIGRAM(S): at 21:34

## 2020-08-29 NOTE — PROGRESS NOTE ADULT - ASSESSMENT
34yo male w/PMH of GERD, HTN (untreated), obesity (gained 80-100lbs in 1 year), ?ELSA, ?herniated disc (right lateral mid thigh numbness), anxiety, active smoker for 15 years (1/2-1 PPD), strong family history of early CAD and MI (both sides, father at 34yo MI & CABG). [place] [facility] [place] and IABP placement.  Patient has been experiencing increased TRINH for about 1-2wks. Saturday night noticed he was having difficulty laying flat d/t orthopnea and PND.  Symptoms worsened Sunday night into Monday morning. Then began getting sharp left chest pain w/left arm numbness/tingling into fingertips, nausea, and hot flashes and chills.  [place] [facility].87. EKG evolving to ST depressions inferolaterally. Pulm edema on CXR. Taken to Cath, found to have EF of 30-40%, proximal LAD disease, ostial LAD, and proximal Circ. Had a right groin IABP placed p/t transfer.     8/27: Pt denies chest pain, palpitations, SOB. Pt awaiting procedure, pt stated he had all his concerns and questions addressed. Plan for CTSX surgical procedure 8/28. Cont. current medication regimen. Tele-NSR HR @ 80-90s     8/28- s/p CABG-  SVG-RCA, SVG-RAMUS. SVG-OM3, SVG- Diag  8/29- NIPPV, off pressors.     CAD  -s/p CABG-  SVG-RCA, SVG-RAMUS. SVG-OM3, SVG- Diag  -cont statin, asa, plavix   -tx as per CTSX team     Lifestyle  -patient confident in smoking cessation and diet/lifestyle modifications 34yo male w/PMH of GERD, HTN (untreated), obesity (gained 80-100lbs in 1 year), ?ELSA, ?herniated disc (right lateral mid thigh numbness), anxiety, active smoker for 15 years (1/2-1 PPD), strong family history of early CAD and MI (both sides, father at 36yo MI & CABG). [place] [facility] [place] and IABP placement.  Patient has been experiencing increased TRINH for about 1-2wks. Saturday night noticed he was having difficulty laying flat d/t orthopnea and PND.  Symptoms worsened Sunday night into Monday morning. Then began getting sharp left chest pain w/left arm numbness/tingling into fingertips, nausea, and hot flashes and chills.  [place] [facility] 0.87. EKG evolving to ST depressions inferolaterally. Pulm edema on CXR. Taken to Cath, found to have EF of 30-40%, proximal LAD disease, ostial LAD, and proximal Circ. Had a right groin IABP placed p/t transfer.     8/27: Pt denies chest pain, palpitations, SOB. Pt awaiting procedure, pt stated he had all his concerns and questions addressed. Plan for CTSX surgical procedure 8/28. Cont. current medication regimen. Tele-NSR HR @ 80-90s     8/28- s/p CABG-  SVG-RCA, SVG-RAMUS. SVG-OM3, SVG- Diag  8/29- NIPPV, off pressors.     CAD  -s/p CABG-  SVG-RCA, SVG-RAMUS. SVG-OM3, SVG- Diag  -cont statin, asa, plavix   -tx as per CTSX team     Tobacco Use  -patient confident in smoking cessation and diet/lifestyle modifications

## 2020-08-29 NOTE — PROGRESS NOTE ADULT - SUBJECTIVE AND OBJECTIVE BOX
Subjective:  34yo M just extubated, given IV tylenol and dilaudid for pain with good resolution.      HPI:  35 year old male, PMH of GERD, HTN, active smoker for 12 years (1/2 PPD), family history of MI (father at 36yo), [place] [facility] [place] with shortness of breath for two days with frothy sputum. Patient had chest pain, with arm pain on his left. CXR showed what looked like pulmonary edema. Patient was given lopressor, lasix, and was started on nitro gtt. ST depressions noted on EKG. Patient taken to the Cath lab and was found to have EF of 30-40%, proximal LAD disease, ostial LAD, and proximal Circ. Patient had a right groin balloon pump inserted. Patient was brought to [place] for further management. (25 Aug 2020 00:01)          PAST MEDICAL & SURGICAL HISTORY:  History of ear surgery  Abdominal hernia          MEDICATIONS  (STANDING):  albuterol/ipratropium for Nebulization 3 milliLiter(s) Nebulizer every 4 hours  albuterol/ipratropium for Nebulization 3 milliLiter(s) Nebulizer every 6 hours  aspirin enteric coated 81 milliGRAM(s) Oral daily  atorvastatin 80 milliGRAM(s) Oral at bedtime  cefuroxime  IVPB 1500 milliGRAM(s) IV Intermittent every 8 hours  chlorhexidine 0.12% Liquid 5 milliLiter(s) Oral Mucosa every 4 hours  chlorhexidine 2% Cloths 1 Application(s) Topical daily  clopidogrel Tablet 75 milliGRAM(s) Oral daily  dexMEDEtomidine Infusion 0.5 MICROgram(s)/kG/Hr (17.3 mL/Hr) IV Continuous <Continuous>  dextrose 50% Injectable 50 milliLiter(s) IV Push every 15 minutes  dextrose 50% Injectable 25 milliLiter(s) IV Push every 15 minutes  EPINEPHrine    Infusion 0.015 MICROgram(s)/kG/Min (8 mL/Hr) IV Continuous <Continuous>  HYDROmorphone  Injectable 0.25 milliGRAM(s) IV Push once  insulin regular Infusion 2 Unit(s)/Hr (2 mL/Hr) IV Continuous <Continuous>  lidocaine   Patch 2 Patch Transdermal daily  norepinephrine Infusion 0.05 MICROgram(s)/kG/Min (13 mL/Hr) IV Continuous <Continuous>  pantoprazole    Tablet 40 milliGRAM(s) Oral daily  potassium chloride  10 mEq/50 mL IVPB 10 milliEquivalent(s) IV Intermittent every 1 hour  potassium chloride  10 mEq/50 mL IVPB 10 milliEquivalent(s) IV Intermittent every 1 hour  potassium chloride  10 mEq/50 mL IVPB 10 milliEquivalent(s) IV Intermittent every 1 hour  propofol Infusion 10 MICROgram(s)/kG/Min (8.32 mL/Hr) IV Continuous <Continuous>  senna 2 Tablet(s) Oral at bedtime  sodium chloride 0.9%. 1000 milliLiter(s) (10 mL/Hr) IV Continuous <Continuous>  sodium chloride 0.9%. 1000 milliLiter(s) (5 mL/Hr) IV Continuous <Continuous>  vancomycin  IVPB 1500 milliGRAM(s) IV Intermittent every 12 hours    MEDICATIONS  (PRN):  ondansetron Injectable 4 milliGRAM(s) IV Push every 4 hours PRN Nausea and/or Vomiting          Allergies    No Known Allergies    Intolerances          WEIGHTS:  Daily     Daily   Admit Wt: Drug Dosing Weight  Height (cm): 177.8 (24 Aug 2020 20:30)  Weight (kg): 138.7 (24 Aug 2020 20:30)  BMI (kg/m2): 43.9 (24 Aug 2020 20:30)  BSA (m2): 2.5 (24 Aug 2020 20:30)  I&O's Summary    27 Aug 2020 07:01  -  28 Aug 2020 07:00  --------------------------------------------------------  IN: 1080 mL / OUT: 3050 mL / NET: -1970 mL    28 Aug 2020 07:01  -  29 Aug 2020 01:32  --------------------------------------------------------  IN: 444.8 mL / OUT: 940 mL / NET: -495.2 mL        VITAL SIGNS:  ICU Vital Signs Last 24 Hrs  T(C): 38.3 (29 Aug 2020 01:00), Max: 38.4 (29 Aug 2020 00:01)  T(F): 100.9 (29 Aug 2020 01:00), Max: 101.1 (29 Aug 2020 00:01)  HR: 100 (29 Aug 2020 01:15) (70 - 106)  BP: 149/94 (28 Aug 2020 07:34) (124/69 - 166/92)  BP(mean): 111 (28 Aug 2020 06:00) (86 - 120)  ABP: 116/65 (29 Aug 2020 01:15) (96/57 - 149/74)  ABP(mean): 80 (29 Aug 2020 01:15) (68 - 95)  RR: 26 (29 Aug 2020 01:15) (14 - 35)  SpO2: 96% (29 Aug 2020 01:15) (89% - 100%)        All laboratory results, radiology and medications reviewed.    LABS:  08-28    142  |  105  |  15.0  ----------------------------<  192<H>  4.9   |  21.0<L>  |  0.85    Ca    7.7<L>      28 Aug 2020 17:32  Mg     2.6     08-28    TPro  5.5<L>  /  Alb  3.4  /  TBili  2.1<H>  /  DBili  x   /  AST  66<H>  /  ALT  29  /  AlkPhos  57  08-28                                 11.8   32.93 )-----------( 296      ( 28 Aug 2020 17:32 )             35.7          PT/INR - ( 28 Aug 2020 17:32 )   PT: 16.2 sec;   INR: 1.42 ratio         PTT - ( 28 Aug 2020 17:32 )  PTT:33.9 sec  Bilirubin Total, Serum: 2.1 mg/dL (08-28 @ 17:32)    ABG - ( 28 Aug 2020 23:46 )  pH, Arterial: 7.42  pH, Blood: x     /  pCO2: 40    /  pO2: 73    / HCO3: 25    / Base Excess: 1.0   /  SaO2: 95                    CAPILLARY BLOOD GLUCOSE      POCT Blood Glucose.: 160 mg/dL (29 Aug 2020 00:54)  POCT Blood Glucose.: 181 mg/dL (28 Aug 2020 21:15)  POCT Blood Glucose.: 205 mg/dL (28 Aug 2020 20:00)  POCT Blood Glucose.: 226 mg/dL (28 Aug 2020 18:59)  POCT Blood Glucose.: 208 mg/dL (28 Aug 2020 18:09)             PHYSICAL EXAM:  General:  obese, well nourished, no acute distress  Neurology:  alert and oriented X 4, nonfocal, no gross deficits  Respiratory:  clear to auscultation bilaterally  CV:  regular rate and rhythm, normal S1 S2  Abdomen:  obese, soft, nontender, nondistended, positive bowel sounds  Extremities:  warm, well perfused, 1+ edema +DP pulses  Incisions:  midline sternal incision, c/d/i, sternum stable

## 2020-08-29 NOTE — PHYSICAL THERAPY INITIAL EVALUATION ADULT - GENERAL OBSERVATIONS, REHAB EVAL
Pt received on 4EST, JEANNE hi'ed pt for PT. pt observed semi-scott in bed with high flow, A-line, right IJ line, chest tube x 2, telemonitor with , tirado, pleasant, cooperative, A&O and c/o 9/10 sternal incision pain

## 2020-08-29 NOTE — PHYSICAL THERAPY INITIAL EVALUATION ADULT - CRITERIA FOR SKILLED THERAPEUTIC INTERVENTIONS
therapy frequency/anticipated discharge recommendation/functional limitations in following categories/risk reduction/prevention/anticipated equipment needs at discharge/impairments found/rehab potential/predicted duration of therapy intervention

## 2020-08-29 NOTE — PROGRESS NOTE ADULT - ASSESSMENT
35 year old male, PMH of GERD, HTN, active smoker for 12 years (1/2 PPD), family history of MI (father at 34yo), comes in [place] [facility] [place]  after presenting with shortness of breath for two days with frothy sputum. Patient had chest pain, with arm pain on his left. CXR showed what looked like pulmonary edema. Patient was given lopressor, lasix, and was started on nitro gtt. ST depressions noted on EKG. Patient taken to the Cath lab and was found to have EF of 30-40%, proximal LAD disease, ostial LAD, and proximal Circ. Patient had a right groin balloon pump inserted. Patient was brought to [place] for further management.   IABP removed the following day after arrival and prior to surgery.  On 8/28 pt underwent C5L, extubated to bipap.

## 2020-08-29 NOTE — PROGRESS NOTE ADULT - SUBJECTIVE AND OBJECTIVE BOX
[place] CARDIOLOGY-[facility]                                                               Office: [address] [address]                                                              Telephone: [phone]. Fax:[phone]                                                                             PROGRESS NOTE  Reason for follow up:   Overnight: No new events.   Update:     Subjective: "  ______________________"      	  Vitals:  T(C): 38 (08-29-20 @ 05:00), Max: 38.4 (08-29-20 @ 00:01)  HR: 95 (08-29-20 @ 12:30) (86 - 121)  BP: --  RR: 36 (08-29-20 @ 12:30) (8 - 45)  SpO2: 98% (08-29-20 @ 12:30) (88% - 100%)  Wt(kg): --  I&O's Summary    28 Aug 2020 07:01  -  29 Aug 2020 07:00  --------------------------------------------------------  IN: 1185 mL / OUT: 1560 mL / NET: -375 mL    29 Aug 2020 07:01  -  29 Aug 2020 13:14  --------------------------------------------------------  IN: 812.4 mL / OUT: 595 mL / NET: 217.4 mL      Weight (kg): 138.7 (08-24 @ 20:30)      PHYSICAL EXAM:  Appearance: Comfortable. No acute distress  HEENT:  Head and neck: Atraumatic. Normocephalic.  Normal oral mucosa, PERRL, Neck is supple. No JVD, No carotid bruit.   Neurologic: A & O x 3, no focal deficits. EOMI.  Lymphatic: No cervical lymphadenopathy  Cardiovascular: Normal S1 S2, No murmur, rubs/gallops. No JVD, No edema  Respiratory: Lungs clear to auscultation  Gastrointestinal:  Soft, Non-tender, + BS  Lower Extremities: No edema  Psychiatry: Patient is calm. No agitation. Mood & affect appropriate  Skin: No rashes/ ecchymoses/cyanosis/ulcers visualized on the face, hands or feet.      CURRENT MEDICATIONS:  furosemide   Injectable 20 milliGRAM(s) IV Push every 8 hours  metoprolol tartrate Injectable 5 milliGRAM(s) IV Push every 6 hours    albuterol/ipratropium for Nebulization  albuterol/ipratropium for Nebulization  cefuroxime  IVPB  vancomycin  IVPB  dexMEDEtomidine Infusion  gabapentin  pantoprazole    Tablet  senna  atorvastatin  dextrose 50% Injectable  dextrose 50% Injectable  insulin regular Infusion  aspirin enteric coated  chlorhexidine 2% Cloths  clopidogrel Tablet  enoxaparin Injectable  lidocaine   Patch  sodium chloride 0.9%.  sodium chloride 0.9%.      DIAGNOSTIC TESTING:  [ ] Echocardiogram:   [ ]  Catheterization:  [ ] Stress Test:    OTHER: 	      LABS:	 	                            10.6   13.95 )-----------( 252      ( 29 Aug 2020 03:07 )             32.7     08-29    145  |  109<H>  |  22.0<H>  ----------------------------<  150<H>  4.4   |  25.0  |  1.18    Ca    8.2<L>      29 Aug 2020 11:55  Mg     2.3     08-29    TPro  5.4<L>  /  Alb  3.2<L>  /  TBili  0.6  /  DBili  x   /  AST  184<H>  /  ALT  38  /  AlkPhos  47  08-29    proBNP: Serum Pro-Brain Natriuretic Peptide: 1384 pg/mL (08-24 @ 21:17)     TSH: Thyroid Stimulating Hormone, Serum: 1.82 uIU/mL      TELEMETRY: Reviewed    ECG:  Reviewed by me. [place] CARDIOLOGY-[facility]                                                               Office: [address] [facility], [place] [address]                                                              Telephone: [phone]. Fax:[phone]                                                                             PROGRESS NOTE  Reason for follow up: CAD- S/p CABG  Update: CABG 8/28- extubated, remains on NIPPV, B/L chest tube, TLC RIJ, tirado, B/L LE ace wrapped. Pt denies chest pain. States wants NIPPV off.     	  Vitals:  T(C): 38 (08-29-20 @ 05:00), Max: 38.4 (08-29-20 @ 00:01)  HR: 95 (08-29-20 @ 12:30) (86 - 121)  RR: 36 (08-29-20 @ 12:30) (8 - 45)  SpO2: 98% (08-29-20 @ 12:30) (88% - 100%)      I&O's Summary    28 Aug 2020 07:01  -  29 Aug 2020 07:00  --------------------------------------------------------  IN: 1185 mL / OUT: 1560 mL / NET: -375 mL    29 Aug 2020 07:01  -  29 Aug 2020 13:14  --------------------------------------------------------  IN: 812.4 mL / OUT: 595 mL / NET: 217.4 mL      Weight (kg): 138.7 (08-24 @ 20:30)      PHYSICAL EXAM:  Appearance: No acute distress  HEENT:Atraumatic. Normocephalic.  Normal oral mucosa, PERRL,   Neurologic: A & O x 3, no focal deficits. EOMI.  Cardiovascular: Normal S1 S2, No murmur, rubs/gallops. No JVD, No edema  Respiratory: CTAB  Gastrointestinal:  Soft, Non-tender, + BS  Lower Extremities: No edema  Psychiatry: Patient is calm. No agitation.  Skin: R IJ TLC, b/L Chest tube, tirado, ace bandages to b/l LE, sternal wound dressed.       CURRENT MEDICATIONS:  furosemide   Injectable 20 milliGRAM(s) IV Push every 8 hours  metoprolol tartrate Injectable 5 milliGRAM(s) IV Push every 6 hours  albuterol/ipratropium for Nebulization  albuterol/ipratropium for Nebulization  cefuroxime  IVPB  vancomycin  IVPB  dexMEDEtomidine Infusion  gabapentin  pantoprazole    Tablet  senna  atorvastatin  dextrose 50% Injectable  dextrose 50% Injectable  insulin regular Infusion  aspirin enteric coated  chlorhexidine 2% Cloths  clopidogrel Tablet  enoxaparin Injectable  lidocaine   Patch  sodium chloride 0.9%.  sodium chloride 0.9%.      LABS:	 	                            10.6   13.95 )-----------( 252      ( 29 Aug 2020 03:07 )             32.7     08-29    145  |  109<H>  |  22.0<H>  ----------------------------<  150<H>  4.4   |  25.0  |  1.18    Ca    8.2<L>      29 Aug 2020 11:55  Mg     2.3     08-29    TPro  5.4<L>  /  Alb  3.2<L>  /  TBili  0.6  /  DBili  x   /  AST  184<H>  /  ALT  38  /  AlkPhos  47  08-29    proBNP: Serum Pro-Brain Natriuretic Peptide: 1384 pg/mL (08-24 @ 21:17)     TSH: Thyroid Stimulating Hormone, Serum: 1.82 uIU/mL      TELEMETRY: Reviewed    ECG:  Reviewed by me.

## 2020-08-29 NOTE — PHYSICAL THERAPY INITIAL EVALUATION ADULT - ADDITIONAL COMMENTS
Pt lives with spouse in a private home with no ROSANGELA, bed&bath on ground level and no stairs inside. Pt's PLOF was independent in all ADL's + ambulation without an Assistive Device and was working and driving PTA. Pt has no DME at home. At this time, RW is recommended upon d/c

## 2020-08-29 NOTE — PHYSICAL THERAPY INITIAL EVALUATION ADULT - PHYSICAL ASSIST/NONPHYSICAL ASSIST: GAIT, REHAB EVAL
1 person assist/1 person + 1 person to manage equipment/pt required increased physical assistance to help maintain proper upright walking posture during ambulation, assistance re proper use of AD and assistance for safety & falls prevention; verbal cues re proper gait sequence + proper use of RW; 2 additional people were required for equipment and line management/verbal cues

## 2020-08-29 NOTE — PHYSICAL THERAPY INITIAL EVALUATION ADULT - PHYSICAL ASSIST/NONPHYSICAL ASSIST: SUPINE/SIT, REHAB EVAL
1 person assist/pt required increased physical assistance to get bilateral LE's out of bed and to help assume proper upright sitting at EOB posture; verbal cues for proper sequence + proper use of bed rails/verbal cues

## 2020-08-29 NOTE — PHYSICAL THERAPY INITIAL EVALUATION ADULT - PHYSICAL ASSIST/NONPHYSICAL ASSIST: STAND/SIT, REHAB EVAL
1 person assist/1 person + 1 person to manage equipment/pt required increased physical assistance to help perform transfer/to safely lower to a sitting position; verbal cues re proper sequence + proper hand placement in prep to perform transfer; 2 additional people were required for equipment and line management/verbal cues

## 2020-08-29 NOTE — PHYSICAL THERAPY INITIAL EVALUATION ADULT - PHYSICAL ASSIST/NONPHYSICAL ASSIST: SIT/STAND, REHAB EVAL
1 person assist/verbal cues/1 person + 1 person to manage equipment/pt required increased physical assistance to help perform transfer/to rise to a full standing position. verbal cues re proper sequence + proper hand placement in prep to perform transfer; 2 additional people were required to help with equipment and line management

## 2020-08-30 DIAGNOSIS — F41.9 ANXIETY DISORDER, UNSPECIFIED: ICD-10-CM

## 2020-08-30 LAB
ANION GAP SERPL CALC-SCNC: 12 MMOL/L — SIGNIFICANT CHANGE UP (ref 5–17)
BUN SERPL-MCNC: 22 MG/DL — HIGH (ref 8–20)
CALCIUM SERPL-MCNC: 8.1 MG/DL — LOW (ref 8.6–10.2)
CHLORIDE SERPL-SCNC: 102 MMOL/L — SIGNIFICANT CHANGE UP (ref 98–107)
CO2 SERPL-SCNC: 24 MMOL/L — SIGNIFICANT CHANGE UP (ref 22–29)
CREAT SERPL-MCNC: 0.87 MG/DL — SIGNIFICANT CHANGE UP (ref 0.5–1.3)
GLUCOSE BLDC GLUCOMTR-MCNC: 124 MG/DL — HIGH (ref 70–99)
GLUCOSE BLDC GLUCOMTR-MCNC: 127 MG/DL — HIGH (ref 70–99)
GLUCOSE BLDC GLUCOMTR-MCNC: 130 MG/DL — HIGH (ref 70–99)
GLUCOSE BLDC GLUCOMTR-MCNC: 134 MG/DL — HIGH (ref 70–99)
GLUCOSE BLDC GLUCOMTR-MCNC: 151 MG/DL — HIGH (ref 70–99)
GLUCOSE SERPL-MCNC: 132 MG/DL — HIGH (ref 70–99)
HCT VFR BLD CALC: 26.8 % — LOW (ref 39–50)
HGB BLD-MCNC: 8.3 G/DL — LOW (ref 13–17)
MAGNESIUM SERPL-MCNC: 2.1 MG/DL — SIGNIFICANT CHANGE UP (ref 1.6–2.6)
MCHC RBC-ENTMCNC: 27.5 PG — SIGNIFICANT CHANGE UP (ref 27–34)
MCHC RBC-ENTMCNC: 31 GM/DL — LOW (ref 32–36)
MCV RBC AUTO: 88.7 FL — SIGNIFICANT CHANGE UP (ref 80–100)
PLATELET # BLD AUTO: 228 K/UL — SIGNIFICANT CHANGE UP (ref 150–400)
POTASSIUM SERPL-MCNC: 4.4 MMOL/L — SIGNIFICANT CHANGE UP (ref 3.5–5.3)
POTASSIUM SERPL-SCNC: 4.4 MMOL/L — SIGNIFICANT CHANGE UP (ref 3.5–5.3)
RBC # BLD: 3.02 M/UL — LOW (ref 4.2–5.8)
RBC # FLD: 14.9 % — HIGH (ref 10.3–14.5)
SODIUM SERPL-SCNC: 138 MMOL/L — SIGNIFICANT CHANGE UP (ref 135–145)
WBC # BLD: 17.09 K/UL — HIGH (ref 3.8–10.5)
WBC # FLD AUTO: 17.09 K/UL — HIGH (ref 3.8–10.5)

## 2020-08-30 PROCEDURE — 71045 X-RAY EXAM CHEST 1 VIEW: CPT | Mod: 26

## 2020-08-30 PROCEDURE — 71045 X-RAY EXAM CHEST 1 VIEW: CPT | Mod: 26,77

## 2020-08-30 RX ORDER — HYDROMORPHONE HYDROCHLORIDE 2 MG/ML
1 INJECTION INTRAMUSCULAR; INTRAVENOUS; SUBCUTANEOUS ONCE
Refills: 0 | Status: DISCONTINUED | OUTPATIENT
Start: 2020-08-30 | End: 2020-08-30

## 2020-08-30 RX ORDER — METOPROLOL TARTRATE 50 MG
25 TABLET ORAL
Refills: 0 | Status: DISCONTINUED | OUTPATIENT
Start: 2020-08-30 | End: 2020-08-30

## 2020-08-30 RX ORDER — HYDROMORPHONE HYDROCHLORIDE 2 MG/ML
0.5 INJECTION INTRAMUSCULAR; INTRAVENOUS; SUBCUTANEOUS ONCE
Refills: 0 | Status: DISCONTINUED | OUTPATIENT
Start: 2020-08-30 | End: 2020-08-30

## 2020-08-30 RX ORDER — METOPROLOL TARTRATE 50 MG
50 TABLET ORAL
Refills: 0 | Status: DISCONTINUED | OUTPATIENT
Start: 2020-08-31 | End: 2020-09-01

## 2020-08-30 RX ORDER — OXYCODONE AND ACETAMINOPHEN 5; 325 MG/1; MG/1
2 TABLET ORAL EVERY 4 HOURS
Refills: 0 | Status: DISCONTINUED | OUTPATIENT
Start: 2020-08-30 | End: 2020-09-03

## 2020-08-30 RX ORDER — FUROSEMIDE 40 MG
20 TABLET ORAL ONCE
Refills: 0 | Status: COMPLETED | OUTPATIENT
Start: 2020-08-30 | End: 2020-08-30

## 2020-08-30 RX ORDER — OXYCODONE AND ACETAMINOPHEN 5; 325 MG/1; MG/1
1 TABLET ORAL EVERY 4 HOURS
Refills: 0 | Status: DISCONTINUED | OUTPATIENT
Start: 2020-08-30 | End: 2020-09-03

## 2020-08-30 RX ORDER — FUROSEMIDE 40 MG
40 TABLET ORAL EVERY 12 HOURS
Refills: 0 | Status: DISCONTINUED | OUTPATIENT
Start: 2020-08-30 | End: 2020-08-31

## 2020-08-30 RX ORDER — INSULIN LISPRO 100/ML
VIAL (ML) SUBCUTANEOUS
Refills: 0 | Status: DISCONTINUED | OUTPATIENT
Start: 2020-08-30 | End: 2020-08-31

## 2020-08-30 RX ORDER — METOPROLOL TARTRATE 50 MG
25 TABLET ORAL ONCE
Refills: 0 | Status: COMPLETED | OUTPATIENT
Start: 2020-08-30 | End: 2020-08-30

## 2020-08-30 RX ORDER — ALPRAZOLAM 0.25 MG
0.25 TABLET ORAL EVERY 8 HOURS
Refills: 0 | Status: DISCONTINUED | OUTPATIENT
Start: 2020-08-30 | End: 2020-09-03

## 2020-08-30 RX ADMIN — HYDROMORPHONE HYDROCHLORIDE 0.5 MILLIGRAM(S): 2 INJECTION INTRAMUSCULAR; INTRAVENOUS; SUBCUTANEOUS at 00:56

## 2020-08-30 RX ADMIN — HYDROMORPHONE HYDROCHLORIDE 1 MILLIGRAM(S): 2 INJECTION INTRAMUSCULAR; INTRAVENOUS; SUBCUTANEOUS at 04:29

## 2020-08-30 RX ADMIN — GABAPENTIN 300 MILLIGRAM(S): 400 CAPSULE ORAL at 21:19

## 2020-08-30 RX ADMIN — OXYCODONE AND ACETAMINOPHEN 2 TABLET(S): 5; 325 TABLET ORAL at 23:00

## 2020-08-30 RX ADMIN — OXYCODONE AND ACETAMINOPHEN 2 TABLET(S): 5; 325 TABLET ORAL at 09:01

## 2020-08-30 RX ADMIN — LIDOCAINE 2 PATCH: 4 CREAM TOPICAL at 14:08

## 2020-08-30 RX ADMIN — Medication 81 MILLIGRAM(S): at 14:07

## 2020-08-30 RX ADMIN — LIDOCAINE 2 PATCH: 4 CREAM TOPICAL at 00:14

## 2020-08-30 RX ADMIN — Medication 3 MILLILITER(S): at 03:16

## 2020-08-30 RX ADMIN — OXYCODONE AND ACETAMINOPHEN 2 TABLET(S): 5; 325 TABLET ORAL at 14:06

## 2020-08-30 RX ADMIN — Medication 3 MILLILITER(S): at 20:11

## 2020-08-30 RX ADMIN — CLOPIDOGREL BISULFATE 75 MILLIGRAM(S): 75 TABLET, FILM COATED ORAL at 14:08

## 2020-08-30 RX ADMIN — HYDROMORPHONE HYDROCHLORIDE 0.5 MILLIGRAM(S): 2 INJECTION INTRAMUSCULAR; INTRAVENOUS; SUBCUTANEOUS at 01:11

## 2020-08-30 RX ADMIN — OXYCODONE AND ACETAMINOPHEN 2 TABLET(S): 5; 325 TABLET ORAL at 22:15

## 2020-08-30 RX ADMIN — Medication 3 MILLILITER(S): at 08:19

## 2020-08-30 RX ADMIN — Medication 0.25 MILLIGRAM(S): at 05:52

## 2020-08-30 RX ADMIN — ENOXAPARIN SODIUM 40 MILLIGRAM(S): 100 INJECTION SUBCUTANEOUS at 02:16

## 2020-08-30 RX ADMIN — Medication 20 MILLIGRAM(S): at 05:52

## 2020-08-30 RX ADMIN — Medication 40 MILLIGRAM(S): at 17:15

## 2020-08-30 RX ADMIN — POLYETHYLENE GLYCOL 3350 17 GRAM(S): 17 POWDER, FOR SOLUTION ORAL at 17:15

## 2020-08-30 RX ADMIN — OXYCODONE AND ACETAMINOPHEN 2 TABLET(S): 5; 325 TABLET ORAL at 15:06

## 2020-08-30 RX ADMIN — Medication 5 MILLIGRAM(S): at 00:15

## 2020-08-30 RX ADMIN — GABAPENTIN 300 MILLIGRAM(S): 400 CAPSULE ORAL at 14:08

## 2020-08-30 RX ADMIN — Medication 100 MILLIGRAM(S): at 05:51

## 2020-08-30 RX ADMIN — OXYCODONE AND ACETAMINOPHEN 2 TABLET(S): 5; 325 TABLET ORAL at 09:31

## 2020-08-30 RX ADMIN — Medication 0.25 MILLIGRAM(S): at 20:34

## 2020-08-30 RX ADMIN — GABAPENTIN 300 MILLIGRAM(S): 400 CAPSULE ORAL at 05:52

## 2020-08-30 RX ADMIN — LIDOCAINE 2 PATCH: 4 CREAM TOPICAL at 20:27

## 2020-08-30 RX ADMIN — CHLORHEXIDINE GLUCONATE 1 APPLICATION(S): 213 SOLUTION TOPICAL at 16:13

## 2020-08-30 RX ADMIN — SENNA PLUS 2 TABLET(S): 8.6 TABLET ORAL at 21:19

## 2020-08-30 RX ADMIN — ATORVASTATIN CALCIUM 80 MILLIGRAM(S): 80 TABLET, FILM COATED ORAL at 21:19

## 2020-08-30 RX ADMIN — Medication 25 MILLIGRAM(S): at 17:15

## 2020-08-30 RX ADMIN — Medication 300 MILLIGRAM(S): at 08:54

## 2020-08-30 RX ADMIN — PANTOPRAZOLE SODIUM 40 MILLIGRAM(S): 20 TABLET, DELAYED RELEASE ORAL at 14:09

## 2020-08-30 RX ADMIN — Medication 25 MILLIGRAM(S): at 21:19

## 2020-08-30 RX ADMIN — Medication 25 MILLIGRAM(S): at 10:16

## 2020-08-30 RX ADMIN — Medication 20 MILLIGRAM(S): at 08:54

## 2020-08-30 RX ADMIN — HYDROMORPHONE HYDROCHLORIDE 1 MILLIGRAM(S): 2 INJECTION INTRAMUSCULAR; INTRAVENOUS; SUBCUTANEOUS at 04:14

## 2020-08-30 RX ADMIN — ENOXAPARIN SODIUM 40 MILLIGRAM(S): 100 INJECTION SUBCUTANEOUS at 08:54

## 2020-08-30 RX ADMIN — OXYCODONE AND ACETAMINOPHEN 2 TABLET(S): 5; 325 TABLET ORAL at 18:07

## 2020-08-30 RX ADMIN — Medication 1: at 06:43

## 2020-08-30 RX ADMIN — ENOXAPARIN SODIUM 40 MILLIGRAM(S): 100 INJECTION SUBCUTANEOUS at 21:19

## 2020-08-30 RX ADMIN — Medication 5 MILLIGRAM(S): at 05:52

## 2020-08-30 NOTE — PROGRESS NOTE ADULT - ASSESSMENT
35 year old male, PMH of GERD, HTN, active smoker for 12 years (1/2 PPD), family history of MI (father at 34yo), [place] [facility] [place] with shortness of breath for two days with frothy sputum. Patient had chest pain, with arm pain on his left. CXR showed what looked like pulmonary edema. Patient was given lopressor, lasix, and was started on nitro gtt. ST depressions noted on EKG. Patient taken to the Cath lab and was found to have EF of 30-40%, proximal LAD disease, ostial LAD, and proximal Circ. Patient had a right groin balloon pump inserted. Patient was brought to [place] for further management.   IABP removed the following day after arrival and prior to surgery.  On 8/28 pt underwent C5L, extubated to bipap.

## 2020-08-30 NOTE — PROGRESS NOTE ADULT - SUBJECTIVE AND OBJECTIVE BOX
Subjective:  34yo M with intermittent c/o pain despite IV Tylenol and IV Dilaudid q 3 hrs      HPI:  35 year old male, PMH of GERD, HTN, active smoker for 12 years (1/2 PPD), family history of MI (father at 34yo), [place] [facility] [place] with shortness of breath for two days with frothy sputum. Patient had chest pain, with arm pain on his left. CXR showed what looked like pulmonary edema. Patient was given lopressor, lasix, and was started on nitro gtt. ST depressions noted on EKG. Patient taken to the Cath lab and was found to have EF of 30-40%, proximal LAD disease, ostial LAD, and proximal Circ. Patient had a right groin balloon pump inserted. Patient was brought to [place] for further management. (25 Aug 2020 00:01)          PAST MEDICAL & SURGICAL HISTORY:  History of ear surgery  Abdominal hernia          MEDICATIONS  (STANDING):  albuterol/ipratropium for Nebulization 3 milliLiter(s) Nebulizer every 6 hours  ALPRAZolam 0.25 milliGRAM(s) Oral every 8 hours  aspirin enteric coated 81 milliGRAM(s) Oral daily  atorvastatin 80 milliGRAM(s) Oral at bedtime  cefuroxime  IVPB 1500 milliGRAM(s) IV Intermittent every 8 hours  chlorhexidine 2% Cloths 1 Application(s) Topical daily  clopidogrel Tablet 75 milliGRAM(s) Oral daily  dextrose 5%. 1000 milliLiter(s) (50 mL/Hr) IV Continuous <Continuous>  dextrose 50% Injectable 50 milliLiter(s) IV Push every 15 minutes  dextrose 50% Injectable 25 milliLiter(s) IV Push every 15 minutes  enoxaparin Injectable 40 milliGRAM(s) SubCutaneous every 12 hours  furosemide   Injectable 20 milliGRAM(s) IV Push every 8 hours  gabapentin 300 milliGRAM(s) Oral every 8 hours  insulin lispro (HumaLOG) corrective regimen sliding scale   SubCutaneous every 6 hours  lidocaine   Patch 2 Patch Transdermal daily  metoprolol tartrate Injectable 5 milliGRAM(s) IV Push every 6 hours  pantoprazole    Tablet 40 milliGRAM(s) Oral daily  polyethylene glycol 3350 17 Gram(s) Oral daily  senna 2 Tablet(s) Oral at bedtime  sodium chloride 0.9%. 1000 milliLiter(s) (10 mL/Hr) IV Continuous <Continuous>  sodium chloride 0.9%. 1000 milliLiter(s) (5 mL/Hr) IV Continuous <Continuous>  vancomycin  IVPB 1500 milliGRAM(s) IV Intermittent every 12 hours    MEDICATIONS  (PRN):  dextrose 40% Gel 15 Gram(s) Oral once PRN Blood Glucose LESS THAN 70 milliGRAM(s)/deciliter  glucagon  Injectable 1 milliGRAM(s) IntraMuscular once PRN Glucose LESS THAN 70 milligrams/deciliter  ondansetron Injectable 4 milliGRAM(s) IV Push every 4 hours PRN Nausea and/or Vomiting          Allergies    No Known Allergies    Intolerances          WEIGHTS:  Daily     Daily   Admit Wt: Drug Dosing Weight  Height (cm): 177.8 (24 Aug 2020 20:30)  Weight (kg): 138.7 (24 Aug 2020 20:30)  BMI (kg/m2): 43.9 (24 Aug 2020 20:30)  BSA (m2): 2.5 (24 Aug 2020 20:30)  I&O's Summary    28 Aug 2020 07:01  -  29 Aug 2020 07:00  --------------------------------------------------------  IN: 1185 mL / OUT: 1590 mL / NET: -405 mL    29 Aug 2020 07:01  -  30 Aug 2020 05:13  --------------------------------------------------------  IN: 1939.1 mL / OUT: 2505 mL / NET: -565.9 mL        VITAL SIGNS:  ICU Vital Signs Last 24 Hrs  T(C): 38.2 (30 Aug 2020 04:00), Max: 38.2 (30 Aug 2020 00:00)  T(F): 100.8 (30 Aug 2020 04:00), Max: 100.8 (30 Aug 2020 00:00)  HR: 106 (30 Aug 2020 04:00) (88 - 121)  BP: --  BP(mean): --  ABP: 127/109 (30 Aug 2020 04:00) (-9/-11 - 176/102)  ABP(mean): 116 (30 Aug 2020 04:00) (-23 - 121)  RR: 28 (30 Aug 2020 04:00) (10 - 45)  SpO2: 94% (30 Aug 2020 04:00) (88% - 100%)        All laboratory results, radiology and medications reviewed.    LABS:  08-30    138  |  102  |  22.0<H>  ----------------------------<  132<H>  4.4   |  24.0  |  0.87    Ca    8.1<L>      30 Aug 2020 04:36  Mg     2.1     08-30    TPro  5.4<L>  /  Alb  3.2<L>  /  TBili  0.6  /  DBili  x   /  AST  184<H>  /  ALT  38  /  AlkPhos  47  08-29                                 8.3    17.09 )-----------( 228      ( 30 Aug 2020 04:36 )             26.8          PT/INR - ( 28 Aug 2020 17:32 )   PT: 16.2 sec;   INR: 1.42 ratio         PTT - ( 28 Aug 2020 17:32 )  PTT:33.9 sec    ABG - ( 29 Aug 2020 23:45 )  pH, Arterial: 7.42  pH, Blood: x     /  pCO2: 42    /  pO2: 102   / HCO3: 27    / Base Excess: 2.5   /  SaO2: 98                    CAPILLARY BLOOD GLUCOSE      POCT Blood Glucose.: 134 mg/dL (30 Aug 2020 00:21)  POCT Blood Glucose.: 144 mg/dL (29 Aug 2020 21:06)  POCT Blood Glucose.: 132 mg/dL (29 Aug 2020 18:45)  POCT Blood Glucose.: 140 mg/dL (29 Aug 2020 16:56)  POCT Blood Glucose.: 146 mg/dL (29 Aug 2020 14:36)  POCT Blood Glucose.: 150 mg/dL (29 Aug 2020 11:44)  POCT Blood Glucose.: 160 mg/dL (29 Aug 2020 08:20)  POCT Blood Glucose.: 166 mg/dL (29 Aug 2020 07:13)             PHYSICAL EXAM:  General:  obese, no acute distress  Neurology:  alert and oriented X 4, nonfocal, no gross deficits  Respiratory:  clear to auscultation bilaterally  CV:  regular rate and rhythm, normal S1 S2  Abdomen:  obese, soft, nontender, nondistended, positive bowel sounds  Extremities:  warm, well perfused, 2+ edema +DP pulses  Incisions:  midline sternal incision, c/d/i, sternum stable

## 2020-08-31 LAB
ANION GAP SERPL CALC-SCNC: 11 MMOL/L — SIGNIFICANT CHANGE UP (ref 5–17)
BUN SERPL-MCNC: 18 MG/DL — SIGNIFICANT CHANGE UP (ref 8–20)
CALCIUM SERPL-MCNC: 8.7 MG/DL — SIGNIFICANT CHANGE UP (ref 8.6–10.2)
CHLORIDE SERPL-SCNC: 95 MMOL/L — LOW (ref 98–107)
CO2 SERPL-SCNC: 29 MMOL/L — SIGNIFICANT CHANGE UP (ref 22–29)
CREAT SERPL-MCNC: 0.71 MG/DL — SIGNIFICANT CHANGE UP (ref 0.5–1.3)
GAS PNL BLDA: SIGNIFICANT CHANGE UP
GLUCOSE BLDC GLUCOMTR-MCNC: 124 MG/DL — HIGH (ref 70–99)
GLUCOSE BLDC GLUCOMTR-MCNC: 89 MG/DL — SIGNIFICANT CHANGE UP (ref 70–99)
GLUCOSE BLDC GLUCOMTR-MCNC: 97 MG/DL — SIGNIFICANT CHANGE UP (ref 70–99)
GLUCOSE SERPL-MCNC: 102 MG/DL — HIGH (ref 70–99)
HCT VFR BLD CALC: 26.3 % — LOW (ref 39–50)
HGB BLD-MCNC: 8.2 G/DL — LOW (ref 13–17)
MAGNESIUM SERPL-MCNC: 2.1 MG/DL — SIGNIFICANT CHANGE UP (ref 1.6–2.6)
MCHC RBC-ENTMCNC: 27.7 PG — SIGNIFICANT CHANGE UP (ref 27–34)
MCHC RBC-ENTMCNC: 31.2 GM/DL — LOW (ref 32–36)
MCV RBC AUTO: 88.9 FL — SIGNIFICANT CHANGE UP (ref 80–100)
PLATELET # BLD AUTO: 250 K/UL — SIGNIFICANT CHANGE UP (ref 150–400)
POTASSIUM SERPL-MCNC: 4.2 MMOL/L — SIGNIFICANT CHANGE UP (ref 3.5–5.3)
POTASSIUM SERPL-SCNC: 4.2 MMOL/L — SIGNIFICANT CHANGE UP (ref 3.5–5.3)
RBC # BLD: 2.96 M/UL — LOW (ref 4.2–5.8)
RBC # FLD: 14.6 % — HIGH (ref 10.3–14.5)
SODIUM SERPL-SCNC: 135 MMOL/L — SIGNIFICANT CHANGE UP (ref 135–145)
WBC # BLD: 15.57 K/UL — HIGH (ref 3.8–10.5)
WBC # FLD AUTO: 15.57 K/UL — HIGH (ref 3.8–10.5)

## 2020-08-31 PROCEDURE — 71045 X-RAY EXAM CHEST 1 VIEW: CPT | Mod: 26

## 2020-08-31 PROCEDURE — 99222 1ST HOSP IP/OBS MODERATE 55: CPT

## 2020-08-31 RX ORDER — SODIUM CHLORIDE 9 MG/ML
3 INJECTION INTRAMUSCULAR; INTRAVENOUS; SUBCUTANEOUS EVERY 8 HOURS
Refills: 0 | Status: DISCONTINUED | OUTPATIENT
Start: 2020-08-31 | End: 2020-09-03

## 2020-08-31 RX ORDER — POTASSIUM CHLORIDE 20 MEQ
20 PACKET (EA) ORAL ONCE
Refills: 0 | Status: COMPLETED | OUTPATIENT
Start: 2020-08-31 | End: 2020-08-31

## 2020-08-31 RX ORDER — BUMETANIDE 0.25 MG/ML
2 INJECTION INTRAMUSCULAR; INTRAVENOUS
Refills: 0 | Status: DISCONTINUED | OUTPATIENT
Start: 2020-08-31 | End: 2020-09-02

## 2020-08-31 RX ORDER — LOSARTAN POTASSIUM 100 MG/1
25 TABLET, FILM COATED ORAL DAILY
Refills: 0 | Status: DISCONTINUED | OUTPATIENT
Start: 2020-08-31 | End: 2020-09-03

## 2020-08-31 RX ADMIN — OXYCODONE AND ACETAMINOPHEN 1 TABLET(S): 5; 325 TABLET ORAL at 19:53

## 2020-08-31 RX ADMIN — Medication 0.25 MILLIGRAM(S): at 06:06

## 2020-08-31 RX ADMIN — Medication 3 MILLILITER(S): at 19:51

## 2020-08-31 RX ADMIN — LIDOCAINE 2 PATCH: 4 CREAM TOPICAL at 02:00

## 2020-08-31 RX ADMIN — SODIUM CHLORIDE 3 MILLILITER(S): 9 INJECTION INTRAMUSCULAR; INTRAVENOUS; SUBCUTANEOUS at 21:42

## 2020-08-31 RX ADMIN — SENNA PLUS 2 TABLET(S): 8.6 TABLET ORAL at 20:33

## 2020-08-31 RX ADMIN — ENOXAPARIN SODIUM 40 MILLIGRAM(S): 100 INJECTION SUBCUTANEOUS at 20:33

## 2020-08-31 RX ADMIN — OXYCODONE AND ACETAMINOPHEN 1 TABLET(S): 5; 325 TABLET ORAL at 20:53

## 2020-08-31 RX ADMIN — LIDOCAINE 2 PATCH: 4 CREAM TOPICAL at 11:13

## 2020-08-31 RX ADMIN — Medication 3 MILLILITER(S): at 02:50

## 2020-08-31 RX ADMIN — Medication 3 MILLILITER(S): at 09:50

## 2020-08-31 RX ADMIN — Medication 81 MILLIGRAM(S): at 11:13

## 2020-08-31 RX ADMIN — POLYETHYLENE GLYCOL 3350 17 GRAM(S): 17 POWDER, FOR SOLUTION ORAL at 11:14

## 2020-08-31 RX ADMIN — OXYCODONE AND ACETAMINOPHEN 1 TABLET(S): 5; 325 TABLET ORAL at 15:43

## 2020-08-31 RX ADMIN — Medication 20 MILLIEQUIVALENT(S): at 06:06

## 2020-08-31 RX ADMIN — BUMETANIDE 2 MILLIGRAM(S): 0.25 INJECTION INTRAMUSCULAR; INTRAVENOUS at 16:14

## 2020-08-31 RX ADMIN — LOSARTAN POTASSIUM 25 MILLIGRAM(S): 100 TABLET, FILM COATED ORAL at 11:13

## 2020-08-31 RX ADMIN — OXYCODONE AND ACETAMINOPHEN 2 TABLET(S): 5; 325 TABLET ORAL at 12:15

## 2020-08-31 RX ADMIN — ATORVASTATIN CALCIUM 80 MILLIGRAM(S): 80 TABLET, FILM COATED ORAL at 20:33

## 2020-08-31 RX ADMIN — Medication 50 MILLIGRAM(S): at 17:35

## 2020-08-31 RX ADMIN — OXYCODONE AND ACETAMINOPHEN 2 TABLET(S): 5; 325 TABLET ORAL at 11:19

## 2020-08-31 RX ADMIN — GABAPENTIN 300 MILLIGRAM(S): 400 CAPSULE ORAL at 20:33

## 2020-08-31 RX ADMIN — OXYCODONE AND ACETAMINOPHEN 1 TABLET(S): 5; 325 TABLET ORAL at 16:40

## 2020-08-31 RX ADMIN — Medication 50 MILLIGRAM(S): at 05:38

## 2020-08-31 RX ADMIN — GABAPENTIN 300 MILLIGRAM(S): 400 CAPSULE ORAL at 05:38

## 2020-08-31 RX ADMIN — LIDOCAINE 2 PATCH: 4 CREAM TOPICAL at 19:17

## 2020-08-31 RX ADMIN — GABAPENTIN 300 MILLIGRAM(S): 400 CAPSULE ORAL at 13:32

## 2020-08-31 RX ADMIN — CLOPIDOGREL BISULFATE 75 MILLIGRAM(S): 75 TABLET, FILM COATED ORAL at 11:13

## 2020-08-31 RX ADMIN — PANTOPRAZOLE SODIUM 40 MILLIGRAM(S): 20 TABLET, DELAYED RELEASE ORAL at 11:14

## 2020-08-31 RX ADMIN — OXYCODONE AND ACETAMINOPHEN 2 TABLET(S): 5; 325 TABLET ORAL at 05:39

## 2020-08-31 RX ADMIN — ENOXAPARIN SODIUM 40 MILLIGRAM(S): 100 INJECTION SUBCUTANEOUS at 09:20

## 2020-08-31 RX ADMIN — Medication 40 MILLIGRAM(S): at 05:38

## 2020-08-31 NOTE — PROGRESS NOTE ADULT - SUBJECTIVE AND OBJECTIVE BOX
SUBJECTIVE   "i am just scared"  paula admits anxiety since before he started     INTERIM HISTORY SIGNIFICANT FOR   s/p cabg post op with high flow and bipap requirements, htn since uptitirated medications     Patient is a 35y old  Male who presents with a chief complaint of chest pain and shortness of breath (30 Aug 2020 05:13)    HPI:  35 year old male, PMH of GERD, HTN, active smoker for 12 years (1/2 PPD), family history of MI (father at 34yo), comes in [place] [facility] [place]  after presenting with shortness of breath for two days with frothy sputum. Patient had chest pain, with arm pain on his left. CXR showed what looked like pulmonary edema. Patient was given lopressor, lasix, and was started on nitro gtt. ST depressions noted on EKG. Patient taken to the Cath lab and was found to have EF of 30-40%, proximal LAD disease, ostial LAD, and proximal Circ. Patient had a right groin balloon pump inserted. Patient was brought to [place] for further management. (25 Aug 2020 00:01)    OBJECTIVE  PAST MEDICAL & SURGICAL HISTORY:  History of ear surgery  Abdominal hernia    No Known Allergies    Home Medications:    VITALS  Currently in sinus rhythm with vitals as below  ICU Vital Signs Last 24 Hrs  T(C): 37 (31 Aug 2020 00:00), Max: 39.1 (30 Aug 2020 09:00)  T(F): 98.6 (31 Aug 2020 00:00), Max: 102.4 (30 Aug 2020 09:00)  HR: 90 (31 Aug 2020 00:00) (90 - 108)  BP: 147/76 (31 Aug 2020 00:00) (118/75 - 184/73)  BP(mean): 105 (31 Aug 2020 00:00) (92 - 119)  ABP: 122/118 (30 Aug 2020 06:00) (81/69 - 132/124)  ABP(mean): 120 (30 Aug 2020 06:00) (72 - 127)  RR: 17 (31 Aug 2020 00:00) (16 - 38)  SpO2: 98% (31 Aug 2020 00:00) (92% - 100%)    Adult Advanced Hemodynamics Last 24 Hrs  CVP(mm Hg): 24 (30 Aug 2020 07:00) (24 - 50)    LABS                        8.3    17.09 )-----------( 228      ( 30 Aug 2020 04:36 )             26.8   08-30    138  |  102  |  22.0<H>  ----------------------------<  132<H>  4.4   |  24.0  |  0.87    Ca    8.1<L>      30 Aug 2020 04:36  Mg     2.1     08-30    TPro  5.4<L>  /  Alb  3.2<L>  /  TBili  0.6  /  DBili  x   /  AST  184<H>  /  ALT  38  /  AlkPhos  47  08-29  CAPILLARY BLOOD GLUCOSE      POCT Blood Glucose.: 124 mg/dL (30 Aug 2020 21:14)      ABG - ( 29 Aug 2020 23:45 )  pH, Arterial: 7.42  pH, Blood: x     /  pCO2: 42    /  pO2: 102   / HCO3: 27    / Base Excess: 2.5   /  SaO2: 98                  IN/OUT    08-29-20 @ 07:01  -  08-30-20 @ 07:00  --------------------------------------------------------  IN: 1964.1 mL / OUT: 3130 mL / NET: -1165.9 mL    08-30-20 @ 07:01  -  08-31-20 @ 01:31  --------------------------------------------------------  IN: 1325 mL / OUT: 2410 mL / NET: -1085 mL      IMAGING  personally reviewed imaging   Xray Chest 1 View-PORTABLE IMMEDIATE:    EXAM:  XR CHEST PORTABLE IMMED 1V                          PROCEDURE DATE:  08/30/2020          INTERPRETATION:  AP erect chest on August 30, 2020 at 3:23 PM. There is a problem with patient's chest tube.    Heart is enlarged. Sternotomy again noted.    There is a mild central congestive picture with mild left base effusion.    A tube, possibly a Pleurx catheter, remains at the left base.    Another tube superimposing more centrally over the chest earlier in the day is no longer evident.    IMPRESSION: As above.              SPEEDY HUGHES M.D., ATTENDING RADIOLOGIST  This document has been electronically signed. Aug 30 2020  4:08PM               (08-30-20 @ 16:04)  Xray Chest 1 View- PORTABLE-Routine:    EXAM:  XR CHEST PORTABLE ROUTINE 1V                          PROCEDURE DATE:  08/30/2020          INTERPRETATION:  Portable chest radiograph    CLINICAL INFORMATION:  Postoperative  Cardiac surgery.    TECHNIQUE:  Portable  AP view of the chest was obtained.    FINDINGS: 8/29/2020 available for review.      [A right IJ catheter sheath tip assembly in SVC.]    Mediastinal drains in place.      The lungs show no peripheral airspace consolidation. Enlarged heart  Obscures medial segments of hemithoraces.      The heart is enlarged in transverse diameter. Status post coronary artery bypass graft procedure. Visualized osseous structures are intact.        IMPRESSION:  Status post open heart surgery.  No evidence of active chest pathology.   Catheters in place.                  SPEEDY LEES M.D., ATTENDING RADIOLOGIST  This document has been electronically signed. Aug 30 2020  9:11AM               (08-30-20 @ 05:37)    CURRENT MEDICATIONS  MEDICATIONS  (STANDING):  albuterol/ipratropium for Nebulization 3 milliLiter(s) Nebulizer every 6 hours  aspirin enteric coated 81 milliGRAM(s) Oral daily  atorvastatin 80 milliGRAM(s) Oral at bedtime  clopidogrel Tablet 75 milliGRAM(s) Oral daily  enoxaparin Injectable 40 milliGRAM(s) SubCutaneous every 12 hours  furosemide   Injectable 40 milliGRAM(s) IV Push every 12 hours  gabapentin 300 milliGRAM(s) Oral every 8 hours  insulin lispro (HumaLOG) corrective regimen sliding scale   SubCutaneous Before meals and at bedtime  lidocaine   Patch 2 Patch Transdermal daily  metoprolol tartrate 50 milliGRAM(s) Oral two times a day  pantoprazole    Tablet 40 milliGRAM(s) Oral daily  polyethylene glycol 3350 17 Gram(s) Oral daily  senna 2 Tablet(s) Oral at bedtime    MEDICATIONS  (PRN):  ALPRAZolam 0.25 milliGRAM(s) Oral every 8 hours PRN anxiety  oxycodone    5 mG/acetaminophen 325 mG 1 Tablet(s) Oral every 4 hours PRN Moderate Pain (4 - 6)  oxycodone    5 mG/acetaminophen 325 mG 2 Tablet(s) Oral every 4 hours PRN Severe Pain (7 - 10)

## 2020-08-31 NOTE — PROGRESS NOTE ADULT - ASSESSMENT
35 year old male, PMH of GERD, HTN, active smoker for 12 years (1/2 PPD), family history of MI (father at 34yo), [place] [facility] [place]  [place] presenting with shortness of breath for two days with frothy sputum. Patient had chest pain, with arm pain on his left. CXR showed what looked like pulmonary edema. Patient was given lopressor, lasix, and was started on nitro gtt. ST depressions noted on EKG. Patient taken to the Cath lab and was found to have EF of 30-40%, proximal LAD disease, ostial LAD, and proximal Circ. Patient had a right groin balloon pump inserted. Patient was brought to [place] for further management.   IABP removed the following day after arrival and prior to surgery.  On 8/28 pt underwent C5L, extubated to bipap secondary to acute on chronic hypoxic resp failure, also with acute blood loss anemia which is notable stable

## 2020-08-31 NOTE — CONSULT NOTE ADULT - ASSESSMENT
Assess    Obese  ELSA    Rec:    Weight loss  Nocturnal Bipap while in the hospital  PSG as OP then AutoPap  OP FU post PSG - to be scheduled on discharge Assess    Obese  ELSA - sleeping well without desat on 12/6    Rec:    Weight loss  Nocturnal Bipap while in the hospital  PSG as OP then AutoPap  OP FU post PSG - to be scheduled on discharge

## 2020-08-31 NOTE — CONSULT NOTE ADULT - SUBJECTIVE AND OBJECTIVE BOX
PULMONARY CONSULT NOTE      ROBY GUTIÉRREZRICARDO-[id number]    Patient is a 35y old  Male who presents with a chief complaint of chest pain and shortness of breath (31 Aug 2020 01:31)      HISTORY OF PRESENT ILLNESS:    35 year old, obese, male, PMH of GERD, HTN, active smoker for 12 years (1/2 PPD), family history of MI (father at 34yo), [facility]  after presenting with shortness of breath for two days with frothy sputum. Patient had chest pain, with arm pain on his left. CXR showed what looked like pulmonary edema. Patient was given lopressor, lasix, and was started on nitro gtt. ST depressions noted on EKG. Patient taken to the Cath lab and was found to have EF of 30-40%, proximal LAD disease, ostial LAD, and proximal Circ. Patient had a right groin balloon pump inserted. Patient was brought to [facility] for further management. Now S/P 5V CABG  Witnessed apneas during sleep  EDS, loud snoring, witnessed apneas, Non-restorative sleep, episodes of choking and gasping during sleep  ESS=14  Sleep onset insomnia due to anxiety    MEDICATIONS  (STANDING):  albuterol/ipratropium for Nebulization 3 milliLiter(s) Nebulizer every 6 hours  aspirin enteric coated 81 milliGRAM(s) Oral daily  atorvastatin 80 milliGRAM(s) Oral at bedtime  buMETAnide Injectable 2 milliGRAM(s) IV Push <User Schedule>  clopidogrel Tablet 75 milliGRAM(s) Oral daily  enoxaparin Injectable 40 milliGRAM(s) SubCutaneous every 12 hours  furosemide   Injectable 40 milliGRAM(s) IV Push every 12 hours  gabapentin 300 milliGRAM(s) Oral every 8 hours  insulin lispro (HumaLOG) corrective regimen sliding scale   SubCutaneous Before meals and at bedtime  lidocaine   Patch 2 Patch Transdermal daily  losartan 25 milliGRAM(s) Oral daily  metoprolol tartrate 50 milliGRAM(s) Oral two times a day  pantoprazole    Tablet 40 milliGRAM(s) Oral daily  polyethylene glycol 3350 17 Gram(s) Oral daily  senna 2 Tablet(s) Oral at bedtime      MEDICATIONS  (PRN):  ALPRAZolam 0.25 milliGRAM(s) Oral every 8 hours PRN anxiety  oxycodone    5 mG/acetaminophen 325 mG 1 Tablet(s) Oral every 4 hours PRN Moderate Pain (4 - 6)  oxycodone    5 mG/acetaminophen 325 mG 2 Tablet(s) Oral every 4 hours PRN Severe Pain (7 - 10)      Allergies    No Known Allergies    Intolerances        PAST MEDICAL & SURGICAL HISTORY:  History of ear surgery  Abdominal hernia      FAMILY HISTORY:      SOCIAL HISTORY  Smoking History:     REVIEW OF SYSTEMS:    CONSTITUTIONAL:  No fevers, chills, sweats    HEENT:  Eyes:  No diplopia or blurred vision. ENT:  No earache, sore throat or runny nose.    CARDIOVASCULAR:  No pressure, squeezing, tightness, or heaviness about the chest; no palpitations.    RESPIRATORY:  No cough, shortness of breath, PND or orthopnea. Mild SOBOE    GASTROINTESTINAL:  No abdominal pain, nausea, vomiting or diarrhea.    GENITOURINARY:  No dysuria, frequency or urgency.    NEUROLOGIC:  No paresthesias, fasciculations, seizures or weakness.    PSYCHIATRIC:  No disorder of thought or mood.    Vital Signs Last 24 Hrs  T(C): 36.9 (31 Aug 2020 12:00), Max: 37.9 (30 Aug 2020 16:00)  T(F): 98.5 (31 Aug 2020 12:00), Max: 100.2 (30 Aug 2020 16:00)  HR: 93 (31 Aug 2020 13:00) (85 - 101)  BP: 120/64 (31 Aug 2020 13:00) (118/75 - 178/81)  BP(mean): 84 (31 Aug 2020 13:00) (84 - 117)  RR: 20 (31 Aug 2020 13:00) (16 - 38)  SpO2: 94% (31 Aug 2020 13:34) (88% - 100%)    PHYSICAL EXAMINATION:    GENERAL: The patient is a well-developed, obese male in no apparent distress.     HEENT: Head is normocephalic and atraumatic. Extraocular muscles are intact. Mucous membranes are moist.   Pharynx: Elongated, edematous uvula.  Enlarged tongue.  Mallamphati III    NECK: Supple. 18.5" circumference    LUNGS: Clear to auscultation without wheezing, rales, or rhonchi. Respirations unlabored    HEART: Regular rate and rhythm without murmur.    ABDOMEN: Soft, nontender, and nondistended.  No hepatosplenomegaly is noted.    EXTREMITIES: Without any cyanosis, clubbing, rash, lesions or edema.    NEUROLOGIC: Grossly intact.      LABS:                        8.2    15.57 )-----------( 250      ( 31 Aug 2020 03:47 )             26.3     08-31    135  |  95<L>  |  18.0  ----------------------------<  102<H>  4.2   |  29.0  |  0.71    Ca    8.7      31 Aug 2020 03:47  Mg     2.1     08-31          ABG - ( 29 Aug 2020 23:45 )  pH, Arterial: 7.42  pH, Blood: x     /  pCO2: 42    /  pO2: 102   / HCO3: 27    / Base Excess: 2.5   /  SaO2: 98            RADIOLOGY & ADDITIONAL STUDIES:     EXAM:  XR CHEST PORTABLE ROUTINE 1V                          PROCEDURE DATE:  08/31/2020          INTERPRETATION:  TECHNIQUE: Single portable view of the chest.    COMPARISON: 8/30/2020    CLINICAL HISTORY: s/p CABGs/p CABG    FINDINGS:    Single frontal view of the chest demonstrates mild CHF. Widened mediastinum, unchanged. Mediastinal sternotomy wire. The cardiomediastinal silhouette is enlarged. No acute osseous abnormalities. Overlying EKG leads and wires are noted. Small bilateral pleural effusions.    IMPRESSION: Mild CHF. Cardiomegaly. Widened mediastinum. Small bilateral pleural effusions.    ISRAEL IBARRA M.D., ATTENDING RADIOLOGIST  This document has been electronically signed. Aug 31 2020  2:27PM PULMONARY CONSULT NOTE      ROBY GUTIÉRREZRICARDO-[id number]    Patient is a 35y old  Male who presents with a chief complaint of chest pain and shortness of breath (31 Aug 2020 01:31)      HISTORY OF PRESENT ILLNESS:    35 year old, obese, male, PMH of GERD, HTN, active smoker for 12 years (1/2 PPD), family history of MI (father at 36yo), comes in from [facility] [place]  after presenting with shortness of breath for two days with frothy sputum. Patient had chest pain, with arm pain on his left. CXR showed what looked like pulmonary edema. Patient was given lopressor, lasix, and was started on nitro gtt. ST depressions noted on EKG. Patient taken to the Cath lab and was found to have EF of 30-40%, proximal LAD disease, ostial LAD, and proximal Circ. Patient had a right groin balloon pump inserted. Patient was brought to [place] for further management. Now S/P 5V CABG  Witnessed apneas during sleep  EDS, loud snoring, witnessed apneas, Non-restorative sleep, episodes of choking and gasping during sleep  ESS=14  Sleep onset insomnia due to anxiety    MEDICATIONS  (STANDING):  albuterol/ipratropium for Nebulization 3 milliLiter(s) Nebulizer every 6 hours  aspirin enteric coated 81 milliGRAM(s) Oral daily  atorvastatin 80 milliGRAM(s) Oral at bedtime  buMETAnide Injectable 2 milliGRAM(s) IV Push <User Schedule>  clopidogrel Tablet 75 milliGRAM(s) Oral daily  enoxaparin Injectable 40 milliGRAM(s) SubCutaneous every 12 hours  furosemide   Injectable 40 milliGRAM(s) IV Push every 12 hours  gabapentin 300 milliGRAM(s) Oral every 8 hours  insulin lispro (HumaLOG) corrective regimen sliding scale   SubCutaneous Before meals and at bedtime  lidocaine   Patch 2 Patch Transdermal daily  losartan 25 milliGRAM(s) Oral daily  metoprolol tartrate 50 milliGRAM(s) Oral two times a day  pantoprazole    Tablet 40 milliGRAM(s) Oral daily  polyethylene glycol 3350 17 Gram(s) Oral daily  senna 2 Tablet(s) Oral at bedtime      MEDICATIONS  (PRN):  ALPRAZolam 0.25 milliGRAM(s) Oral every 8 hours PRN anxiety  oxycodone    5 mG/acetaminophen 325 mG 1 Tablet(s) Oral every 4 hours PRN Moderate Pain (4 - 6)  oxycodone    5 mG/acetaminophen 325 mG 2 Tablet(s) Oral every 4 hours PRN Severe Pain (7 - 10)      Allergies    No Known Allergies    Intolerances        PAST MEDICAL & SURGICAL HISTORY:  History of ear surgery  Abdominal hernia      FAMILY HISTORY:      SOCIAL HISTORY  Smoking History:     REVIEW OF SYSTEMS:    CONSTITUTIONAL:  No fevers, chills, sweats    HEENT:  Eyes:  No diplopia or blurred vision. ENT:  No earache, sore throat or runny nose.    CARDIOVASCULAR:  No pressure, squeezing, tightness, or heaviness about the chest; no palpitations.    RESPIRATORY:  No cough, shortness of breath, PND or orthopnea. Mild SOBOE    GASTROINTESTINAL:  No abdominal pain, nausea, vomiting or diarrhea.    GENITOURINARY:  No dysuria, frequency or urgency.    NEUROLOGIC:  No paresthesias, fasciculations, seizures or weakness.    PSYCHIATRIC:  No disorder of thought or mood.    Vital Signs Last 24 Hrs  T(C): 36.9 (31 Aug 2020 12:00), Max: 37.9 (30 Aug 2020 16:00)  T(F): 98.5 (31 Aug 2020 12:00), Max: 100.2 (30 Aug 2020 16:00)  HR: 93 (31 Aug 2020 13:00) (85 - 101)  BP: 120/64 (31 Aug 2020 13:00) (118/75 - 178/81)  BP(mean): 84 (31 Aug 2020 13:00) (84 - 117)  RR: 20 (31 Aug 2020 13:00) (16 - 38)  SpO2: 94% (31 Aug 2020 13:34) (88% - 100%)  BMI=43    PHYSICAL EXAMINATION:    GENERAL: The patient is a well-developed, obese male in no apparent distress.     HEENT: Head is normocephalic and atraumatic. Extraocular muscles are intact. Mucous membranes are moist.   Pharynx: Elongated, edematous uvula.  Enlarged tongue.  Mallamphati III    NECK: Supple. 18.5" circumference    LUNGS: Clear to auscultation without wheezing, rales, or rhonchi. Respirations unlabored    HEART: Regular rate and rhythm without murmur.    ABDOMEN: Soft, nontender, and nondistended.  No hepatosplenomegaly is noted.    EXTREMITIES: Without any cyanosis, clubbing, rash, lesions or edema.    NEUROLOGIC: Grossly intact.      LABS:                        8.2    15.57 )-----------( 250      ( 31 Aug 2020 03:47 )             26.3     08-31    135  |  95<L>  |  18.0  ----------------------------<  102<H>  4.2   |  29.0  |  0.71    Ca    8.7      31 Aug 2020 03:47  Mg     2.1     08-31          ABG - ( 29 Aug 2020 23:45 )  pH, Arterial: 7.42  pH, Blood: x     /  pCO2: 42    /  pO2: 102   / HCO3: 27    / Base Excess: 2.5   /  SaO2: 98            RADIOLOGY & ADDITIONAL STUDIES:     EXAM:  XR CHEST PORTABLE ROUTINE 1V                          PROCEDURE DATE:  08/31/2020          INTERPRETATION:  TECHNIQUE: Single portable view of the chest.    COMPARISON: 8/30/2020    CLINICAL HISTORY: s/p CABGs/p CABG    FINDINGS:    Single frontal view of the chest demonstrates mild CHF. Widened mediastinum, unchanged. Mediastinal sternotomy wire. The cardiomediastinal silhouette is enlarged. No acute osseous abnormalities. Overlying EKG leads and wires are noted. Small bilateral pleural effusions.    IMPRESSION: Mild CHF. Cardiomegaly. Widened mediastinum. Small bilateral pleural effusions.    ISRAEL IBARRA M.D., ATTENDING RADIOLOGIST  This document has been electronically signed. Aug 31 2020  2:27PM PULMONARY CONSULT NOTE      ROBY GUTIÉRREZRICARDO-[id number]    Patient is a 35y old  Male who presents with a chief complaint of chest pain and shortness of breath (31 Aug 2020 01:31)      HISTORY OF PRESENT ILLNESS:    35 year old, obese, male, PMH of GERD, HTN, active smoker for 12 years (1/2 PPD), family history of MI (father at 36yo), comes in from [facility] [place]  after presenting with shortness of breath for two days with frothy sputum. Patient had chest pain, with arm pain on his left. CXR showed what looked like pulmonary edema. Patient was given lopressor, lasix, and was started on nitro gtt. ST depressions noted on EKG. Patient taken to the Cath lab and was found to have EF of 30-40%, proximal LAD disease, ostial LAD, and proximal Circ. Patient had a right groin balloon pump inserted. Patient was brought to [place] for further management. Now S/P 5V CABG  Witnessed apneas during sleep  EDS, loud snoring, witnessed apneas, Non-restorative sleep, episodes of choking and gasping during sleep  ESS=14  Sleep onset insomnia due to anxiety    MEDICATIONS  (STANDING):  albuterol/ipratropium for Nebulization 3 milliLiter(s) Nebulizer every 6 hours  aspirin enteric coated 81 milliGRAM(s) Oral daily  atorvastatin 80 milliGRAM(s) Oral at bedtime  buMETAnide Injectable 2 milliGRAM(s) IV Push <User Schedule>  clopidogrel Tablet 75 milliGRAM(s) Oral daily  enoxaparin Injectable 40 milliGRAM(s) SubCutaneous every 12 hours  furosemide   Injectable 40 milliGRAM(s) IV Push every 12 hours  gabapentin 300 milliGRAM(s) Oral every 8 hours  insulin lispro (HumaLOG) corrective regimen sliding scale   SubCutaneous Before meals and at bedtime  lidocaine   Patch 2 Patch Transdermal daily  losartan 25 milliGRAM(s) Oral daily  metoprolol tartrate 50 milliGRAM(s) Oral two times a day  pantoprazole    Tablet 40 milliGRAM(s) Oral daily  polyethylene glycol 3350 17 Gram(s) Oral daily  senna 2 Tablet(s) Oral at bedtime      MEDICATIONS  (PRN):  ALPRAZolam 0.25 milliGRAM(s) Oral every 8 hours PRN anxiety  oxycodone    5 mG/acetaminophen 325 mG 1 Tablet(s) Oral every 4 hours PRN Moderate Pain (4 - 6)  oxycodone    5 mG/acetaminophen 325 mG 2 Tablet(s) Oral every 4 hours PRN Severe Pain (7 - 10)      Allergies    No Known Allergies    Intolerances        PAST MEDICAL & SURGICAL HISTORY:  History of ear surgery  Abdominal hernia      FAMILY HISTORY:      SOCIAL HISTORY  Smoking History:     REVIEW OF SYSTEMS:    CONSTITUTIONAL:  No fevers, chills, sweats    HEENT:  Eyes:  No diplopia or blurred vision. ENT:  No earache, sore throat or runny nose.    CARDIOVASCULAR:  No pressure, squeezing, tightness, or heaviness about the chest; no palpitations.    RESPIRATORY:  No cough, shortness of breath, PND or orthopnea. Mild SOBOE    GASTROINTESTINAL:  No abdominal pain, nausea, vomiting or diarrhea.    GENITOURINARY:  No dysuria, frequency or urgency.    NEUROLOGIC:  No paresthesias, fasciculations, seizures or weakness.    PSYCHIATRIC:  No disorder of thought or mood.    Vital Signs Last 24 Hrs  T(C): 36.9 (31 Aug 2020 12:00), Max: 37.9 (30 Aug 2020 16:00)  T(F): 98.5 (31 Aug 2020 12:00), Max: 100.2 (30 Aug 2020 16:00)  HR: 93 (31 Aug 2020 13:00) (85 - 101)  BP: 120/64 (31 Aug 2020 13:00) (118/75 - 178/81)  BP(mean): 84 (31 Aug 2020 13:00) (84 - 117)  RR: 20 (31 Aug 2020 13:00) (16 - 38)  SpO2: 94% (31 Aug 2020 13:34) (88% - 100%)  BMI=43    PHYSICAL EXAMINATION:    GENERAL: The patient is a well-developed, obese male in no apparent distress.     HEENT: Head is normocephalic and atraumatic. Extraocular muscles are intact. Mucous membranes are moist.   Pharynx: Elongated, edematous uvula.  Enlarged tongue.  Mallamphati III    NECK: Supple. 18.5" circumference    LUNGS: Clear to auscultation without wheezing, rales, or rhonchi. Respirations unlabored    HEART: Regular rate and rhythm without murmur.    ABDOMEN: Soft, nontender, and nondistended.  No hepatosplenomegaly is noted.    EXTREMITIES: Without any cyanosis, clubbing, rash, lesions or edema.    NEUROLOGIC: Grossly intact.      LABS:                        8.2    15.57 )-----------( 250      ( 31 Aug 2020 03:47 )             26.3     08-31    135  |  95<L>  |  18.0  ----------------------------<  102<H>  4.2   |  29.0  |  0.71    Ca    8.7      31 Aug 2020 03:47  Mg     2.1     08-31          ABG - ( 29 Aug 2020 23:45 )  pH, Arterial: 7.42  pH, Blood: x     /  pCO2: 42    /  pO2: 102   / HCO3: 27    / Base Excess: 2.5   /  SaO2: 98        COVID PCR neg on 8/25/20    RADIOLOGY & ADDITIONAL STUDIES:     EXAM:  XR CHEST PORTABLE ROUTINE 1V                          PROCEDURE DATE:  08/31/2020          INTERPRETATION:  TECHNIQUE: Single portable view of the chest.    COMPARISON: 8/30/2020    CLINICAL HISTORY: s/p CABGs/p CABG    FINDINGS:    Single frontal view of the chest demonstrates mild CHF. Widened mediastinum, unchanged. Mediastinal sternotomy wire. The cardiomediastinal silhouette is enlarged. No acute osseous abnormalities. Overlying EKG leads and wires are noted. Small bilateral pleural effusions.    IMPRESSION: Mild CHF. Cardiomegaly. Widened mediastinum. Small bilateral pleural effusions.    ISRAEL IBARRA M.D., ATTENDING RADIOLOGIST  This document has been electronically signed. Aug 31 2020  2:27PM    Echo on 8/25/20   1. Technically difficult study with poor endocardial visualization.   2. Endocardial visualization was enhanced with intravenous echo contrast.   3. The left ventricle endocardium is not well visualized despite use of IV echo contrast. Left ventricle systolic function appears moderatedly reduced estimated LVEF   40%. Global left ventricle hypokinesis. Mildly increased septal wall thickness. Moderately increased posterior wall thickness.   4. The right ventricle was not well visualized.   5. No aortic stenosis.   6. Pericardium not well visualized.   7. Recommend clinical correlation with the above findings.    Shelbi Langston MD Electronically signed on 8/25/2020 at 3:35:22 PM

## 2020-09-01 ENCOUNTER — NON-APPOINTMENT (OUTPATIENT)
Age: 35
End: 2020-09-01

## 2020-09-01 DIAGNOSIS — Z86.79 PERSONAL HISTORY OF OTHER DISEASES OF THE CIRCULATORY SYSTEM: ICD-10-CM

## 2020-09-01 DIAGNOSIS — G47.33 OBSTRUCTIVE SLEEP APNEA (ADULT) (PEDIATRIC): ICD-10-CM

## 2020-09-01 DIAGNOSIS — I25.10 ATHEROSCLEROTIC HEART DISEASE OF NATIVE CORONARY ARTERY W/OUT ANGINA PECTORIS: ICD-10-CM

## 2020-09-01 LAB
ANION GAP SERPL CALC-SCNC: 14 MMOL/L — SIGNIFICANT CHANGE UP (ref 5–17)
BUN SERPL-MCNC: 15 MG/DL — SIGNIFICANT CHANGE UP (ref 8–20)
CALCIUM SERPL-MCNC: 8.5 MG/DL — LOW (ref 8.6–10.2)
CHLORIDE SERPL-SCNC: 96 MMOL/L — LOW (ref 98–107)
CO2 SERPL-SCNC: 26 MMOL/L — SIGNIFICANT CHANGE UP (ref 22–29)
CREAT SERPL-MCNC: 0.57 MG/DL — SIGNIFICANT CHANGE UP (ref 0.5–1.3)
GLUCOSE BLDC GLUCOMTR-MCNC: 139 MG/DL — HIGH (ref 70–99)
GLUCOSE SERPL-MCNC: 98 MG/DL — SIGNIFICANT CHANGE UP (ref 70–99)
HCT VFR BLD CALC: 27.2 % — LOW (ref 39–50)
HGB BLD-MCNC: 8.5 G/DL — LOW (ref 13–17)
MAGNESIUM SERPL-MCNC: 2.1 MG/DL — SIGNIFICANT CHANGE UP (ref 1.6–2.6)
MCHC RBC-ENTMCNC: 27.7 PG — SIGNIFICANT CHANGE UP (ref 27–34)
MCHC RBC-ENTMCNC: 31.3 GM/DL — LOW (ref 32–36)
MCV RBC AUTO: 88.6 FL — SIGNIFICANT CHANGE UP (ref 80–100)
PLATELET # BLD AUTO: 241 K/UL — SIGNIFICANT CHANGE UP (ref 150–400)
POTASSIUM SERPL-MCNC: 4.6 MMOL/L — SIGNIFICANT CHANGE UP (ref 3.5–5.3)
POTASSIUM SERPL-SCNC: 4.6 MMOL/L — SIGNIFICANT CHANGE UP (ref 3.5–5.3)
RBC # BLD: 3.07 M/UL — LOW (ref 4.2–5.8)
RBC # FLD: 14.4 % — SIGNIFICANT CHANGE UP (ref 10.3–14.5)
SODIUM SERPL-SCNC: 136 MMOL/L — SIGNIFICANT CHANGE UP (ref 135–145)
WBC # BLD: 12.61 K/UL — HIGH (ref 3.8–10.5)
WBC # FLD AUTO: 12.61 K/UL — HIGH (ref 3.8–10.5)

## 2020-09-01 PROCEDURE — 99232 SBSQ HOSP IP/OBS MODERATE 35: CPT

## 2020-09-01 PROCEDURE — 71045 X-RAY EXAM CHEST 1 VIEW: CPT | Mod: 26

## 2020-09-01 RX ORDER — OXYCODONE HYDROCHLORIDE 5 MG/1
10 TABLET ORAL ONCE
Refills: 0 | Status: DISCONTINUED | OUTPATIENT
Start: 2020-09-01 | End: 2020-09-01

## 2020-09-01 RX ORDER — METOPROLOL TARTRATE 50 MG
100 TABLET ORAL EVERY 12 HOURS
Refills: 0 | Status: DISCONTINUED | OUTPATIENT
Start: 2020-09-02 | End: 2020-09-03

## 2020-09-01 RX ORDER — METOPROLOL TARTRATE 50 MG
50 TABLET ORAL ONCE
Refills: 0 | Status: COMPLETED | OUTPATIENT
Start: 2020-09-01 | End: 2020-09-01

## 2020-09-01 RX ORDER — ACETAMINOPHEN 500 MG
1000 TABLET ORAL ONCE
Refills: 0 | Status: COMPLETED | OUTPATIENT
Start: 2020-09-01 | End: 2020-09-01

## 2020-09-01 RX ORDER — ACETAMINOPHEN 500 MG
1000 TABLET ORAL ONCE
Refills: 0 | Status: DISCONTINUED | OUTPATIENT
Start: 2020-09-01 | End: 2020-09-01

## 2020-09-01 RX ADMIN — LIDOCAINE 2 PATCH: 4 CREAM TOPICAL at 19:30

## 2020-09-01 RX ADMIN — LOSARTAN POTASSIUM 25 MILLIGRAM(S): 100 TABLET, FILM COATED ORAL at 05:11

## 2020-09-01 RX ADMIN — Medication 3 MILLILITER(S): at 02:56

## 2020-09-01 RX ADMIN — OXYCODONE AND ACETAMINOPHEN 1 TABLET(S): 5; 325 TABLET ORAL at 05:10

## 2020-09-01 RX ADMIN — OXYCODONE AND ACETAMINOPHEN 1 TABLET(S): 5; 325 TABLET ORAL at 10:10

## 2020-09-01 RX ADMIN — OXYCODONE HYDROCHLORIDE 10 MILLIGRAM(S): 5 TABLET ORAL at 17:15

## 2020-09-01 RX ADMIN — Medication 81 MILLIGRAM(S): at 12:48

## 2020-09-01 RX ADMIN — PANTOPRAZOLE SODIUM 40 MILLIGRAM(S): 20 TABLET, DELAYED RELEASE ORAL at 12:48

## 2020-09-01 RX ADMIN — Medication 3 MILLILITER(S): at 15:57

## 2020-09-01 RX ADMIN — ATORVASTATIN CALCIUM 80 MILLIGRAM(S): 80 TABLET, FILM COATED ORAL at 21:39

## 2020-09-01 RX ADMIN — GABAPENTIN 300 MILLIGRAM(S): 400 CAPSULE ORAL at 05:10

## 2020-09-01 RX ADMIN — BUMETANIDE 2 MILLIGRAM(S): 0.25 INJECTION INTRAMUSCULAR; INTRAVENOUS at 05:11

## 2020-09-01 RX ADMIN — SODIUM CHLORIDE 3 MILLILITER(S): 9 INJECTION INTRAMUSCULAR; INTRAVENOUS; SUBCUTANEOUS at 21:32

## 2020-09-01 RX ADMIN — BUMETANIDE 2 MILLIGRAM(S): 0.25 INJECTION INTRAMUSCULAR; INTRAVENOUS at 17:15

## 2020-09-01 RX ADMIN — Medication 50 MILLIGRAM(S): at 18:40

## 2020-09-01 RX ADMIN — ENOXAPARIN SODIUM 40 MILLIGRAM(S): 100 INJECTION SUBCUTANEOUS at 21:39

## 2020-09-01 RX ADMIN — OXYCODONE HYDROCHLORIDE 10 MILLIGRAM(S): 5 TABLET ORAL at 18:10

## 2020-09-01 RX ADMIN — POLYETHYLENE GLYCOL 3350 17 GRAM(S): 17 POWDER, FOR SOLUTION ORAL at 12:48

## 2020-09-01 RX ADMIN — ENOXAPARIN SODIUM 40 MILLIGRAM(S): 100 INJECTION SUBCUTANEOUS at 09:14

## 2020-09-01 RX ADMIN — Medication 3 MILLILITER(S): at 08:53

## 2020-09-01 RX ADMIN — GABAPENTIN 300 MILLIGRAM(S): 400 CAPSULE ORAL at 12:51

## 2020-09-01 RX ADMIN — CLOPIDOGREL BISULFATE 75 MILLIGRAM(S): 75 TABLET, FILM COATED ORAL at 12:48

## 2020-09-01 RX ADMIN — LIDOCAINE 2 PATCH: 4 CREAM TOPICAL at 00:10

## 2020-09-01 RX ADMIN — GABAPENTIN 300 MILLIGRAM(S): 400 CAPSULE ORAL at 21:39

## 2020-09-01 RX ADMIN — Medication 400 MILLIGRAM(S): at 00:15

## 2020-09-01 RX ADMIN — OXYCODONE AND ACETAMINOPHEN 1 TABLET(S): 5; 325 TABLET ORAL at 09:13

## 2020-09-01 RX ADMIN — LIDOCAINE 2 PATCH: 4 CREAM TOPICAL at 12:47

## 2020-09-01 RX ADMIN — Medication 0.25 MILLIGRAM(S): at 21:46

## 2020-09-01 RX ADMIN — OXYCODONE AND ACETAMINOPHEN 1 TABLET(S): 5; 325 TABLET ORAL at 22:39

## 2020-09-01 RX ADMIN — Medication 50 MILLIGRAM(S): at 17:15

## 2020-09-01 RX ADMIN — SODIUM CHLORIDE 3 MILLILITER(S): 9 INJECTION INTRAMUSCULAR; INTRAVENOUS; SUBCUTANEOUS at 12:48

## 2020-09-01 RX ADMIN — SODIUM CHLORIDE 3 MILLILITER(S): 9 INJECTION INTRAMUSCULAR; INTRAVENOUS; SUBCUTANEOUS at 05:11

## 2020-09-01 RX ADMIN — OXYCODONE AND ACETAMINOPHEN 1 TABLET(S): 5; 325 TABLET ORAL at 21:39

## 2020-09-01 RX ADMIN — Medication 3 MILLILITER(S): at 20:25

## 2020-09-01 RX ADMIN — Medication 50 MILLIGRAM(S): at 05:11

## 2020-09-01 NOTE — PROGRESS NOTE ADULT - ASSESSMENT
35 year old male, PMH of GERD, HTN, active smoker for 12 years (1/2 PPD), family history of MI (father at 34yo), comes in from [facility]  after presenting with shortness of breath for two days with frothy sputum. Patient had chest pain, with arm pain on his left. CXR showed what looked like pulmonary edema. Patient was given lopressor, lasix, and was started on nitro gtt. ST depressions noted on EKG. Patient taken to the Cath lab and was found to have EF of 30-40%, proximal LAD disease, ostial LAD, and proximal Circ. Patient had a right groin balloon pump inserted. Patient was brought to [facility] for further management.   IABP removed the following day after arrival and prior to surgery.  On 8/28 pt underwent C5L, extubated to bipap secondary to acute on chronic hypoxic resp failure, also with acute blood loss anemia which is notable stable

## 2020-09-01 NOTE — PROGRESS NOTE ADULT - SUBJECTIVE AND OBJECTIVE BOX
PULMONARY Progress NOTE      ROBY GUTIÉRREZANNA-[id number]    Patient is a 35y old  Male who presents with a chief complaint of chest pain and shortness of breath (31 Aug 2020 01:31)      HISTORY OF PRESENT ILLNESS:    35 year old, obese, male, PMH of GERD, HTN, active smoker for 12 years (1/2 PPD), family history of MI (father at 36yo), comes in [place] [facility]  after presenting with shortness of breath for two days with frothy sputum. Patient had chest pain, with arm pain on his left. CXR showed what looked like pulmonary edema. Patient was given lopressor, lasix, and was started on nitro gtt. ST depressions noted on EKG. Patient taken to the Cath lab and was found to have EF of 30-40%, proximal LAD disease, ostial LAD, and proximal Circ. Patient had a right groin balloon pump inserted. Patient was brought to [place] for further management. Now S/P 5V CABG  Witnessed apneas during sleep  EDS, loud snoring, witnessed apneas, Non-restorative sleep, episodes of choking and gasping during sleep  ESS=14  Sleep onset insomnia due to anxiety    Interval History:  Progressing well  Slept great on 12/6 - no desat    MEDICATIONS  (STANDING):  albuterol/ipratropium for Nebulization 3 milliLiter(s) Nebulizer every 6 hours  aspirin enteric coated 81 milliGRAM(s) Oral daily  atorvastatin 80 milliGRAM(s) Oral at bedtime  buMETAnide Injectable 2 milliGRAM(s) IV Push <User Schedule>  clopidogrel Tablet 75 milliGRAM(s) Oral daily  enoxaparin Injectable 40 milliGRAM(s) SubCutaneous every 12 hours  furosemide   Injectable 40 milliGRAM(s) IV Push every 12 hours  gabapentin 300 milliGRAM(s) Oral every 8 hours  insulin lispro (HumaLOG) corrective regimen sliding scale   SubCutaneous Before meals and at bedtime  lidocaine   Patch 2 Patch Transdermal daily  losartan 25 milliGRAM(s) Oral daily  metoprolol tartrate 50 milliGRAM(s) Oral two times a day  pantoprazole    Tablet 40 milliGRAM(s) Oral daily  polyethylene glycol 3350 17 Gram(s) Oral daily  senna 2 Tablet(s) Oral at bedtime      MEDICATIONS  (PRN):  ALPRAZolam 0.25 milliGRAM(s) Oral every 8 hours PRN anxiety  oxycodone    5 mG/acetaminophen 325 mG 1 Tablet(s) Oral every 4 hours PRN Moderate Pain (4 - 6)  oxycodone    5 mG/acetaminophen 325 mG 2 Tablet(s) Oral every 4 hours PRN Severe Pain (7 - 10)      Allergies    No Known Allergies    Intolerances        PAST MEDICAL & SURGICAL HISTORY:  History of ear surgery  Abdominal hernia      FAMILY HISTORY:      SOCIAL HISTORY  Smoking History:     REVIEW OF SYSTEMS:    CONSTITUTIONAL:  No fevers, chills, sweats    HEENT:  Eyes:  No diplopia or blurred vision. ENT:  No earache, sore throat or runny nose.    CARDIOVASCULAR:  No pressure, squeezing, tightness, or heaviness about the chest; no palpitations.    RESPIRATORY:  No cough, shortness of breath, PND or orthopnea. Mild SOBOE    GASTROINTESTINAL:  No abdominal pain, nausea, vomiting or diarrhea.    GENITOURINARY:  No dysuria, frequency or urgency.    NEUROLOGIC:  No paresthesias, fasciculations, seizures or weakness.    PSYCHIATRIC:  No disorder of thought or mood.    Vital Signs Last 24 Hrs  T(C): 36.9 (31 Aug 2020 12:00), Max: 37.9 (30 Aug 2020 16:00)  T(F): 98.5 (31 Aug 2020 12:00), Max: 100.2 (30 Aug 2020 16:00)  HR: 93 (31 Aug 2020 13:00) (85 - 101)  BP: 120/64 (31 Aug 2020 13:00) (118/75 - 178/81)  BP(mean): 84 (31 Aug 2020 13:00) (84 - 117)  RR: 20 (31 Aug 2020 13:00) (16 - 38)  SpO2: 94% (31 Aug 2020 13:34) (88% - 100%)  BMI=43    PHYSICAL EXAMINATION:    GENERAL: The patient is a well-developed, obese male in no apparent distress.     HEENT: Head is normocephalic and atraumatic. Extraocular muscles are intact. Mucous membranes are moist.   Pharynx: Elongated, edematous uvula.  Enlarged tongue.  Mallamphati III    NECK: Supple. 18.5" circumference    LUNGS: Clear to auscultation without wheezing, rales, or rhonchi. Respirations unlabored    HEART: Regular rate and rhythm without murmur.    ABDOMEN: Soft, nontender, and nondistended.  No hepatosplenomegaly is noted.    EXTREMITIES: Without any cyanosis, clubbing, rash, lesions or edema.    NEUROLOGIC: Grossly intact.      LABS:                        8.2    15.57 )-----------( 250      ( 31 Aug 2020 03:47 )             26.3     08-31    135  |  95<L>  |  18.0  ----------------------------<  102<H>  4.2   |  29.0  |  0.71    Ca    8.7      31 Aug 2020 03:47  Mg     2.1     08-31          ABG - ( 29 Aug 2020 23:45 )  pH, Arterial: 7.42  pH, Blood: x     /  pCO2: 42    /  pO2: 102   / HCO3: 27    / Base Excess: 2.5   /  SaO2: 98            RADIOLOGY & ADDITIONAL STUDIES:     EXAM:  XR CHEST PORTABLE ROUTINE 1V                          PROCEDURE DATE:  08/31/2020          INTERPRETATION:  TECHNIQUE: Single portable view of the chest.    COMPARISON: 8/30/2020    CLINICAL HISTORY: s/p CABGs/p CABG    FINDINGS:    Single frontal view of the chest demonstrates mild CHF. Widened mediastinum, unchanged. Mediastinal sternotomy wire. The cardiomediastinal silhouette is enlarged. No acute osseous abnormalities. Overlying EKG leads and wires are noted. Small bilateral pleural effusions.    IMPRESSION: Mild CHF. Cardiomegaly. Widened mediastinum. Small bilateral pleural effusions.    ISRAEL IBARRA M.D., ATTENDING RADIOLOGIST  This document has been electronically signed. Aug 31 2020  2:27PM

## 2020-09-01 NOTE — PROGRESS NOTE ADULT - ASSESSMENT
Nursing notes reviewed and accepted.      Jael Love is a 4 month old female who presents for 4 month old well child exam.  Patient presents with mom and 1 older sister.    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  Mom has used a little bit of breast milk in that eye and also did the tear duct massage.  Diet/feeding:  Breastfeeding every 2 hours during the day and she can sleep up to 9 hours at night between feedings.  Elimination patterns:  5-6 small bowel movements a day and 6-8 wet diapers a day  Sleep:  9 hours at night and 2-3 naps a day  :  Mom is home with her during the day    INTERVAL HISTORY:  Illnesses: No    Vision:   Normal  Hearing:  Normal  Reactions to immunizations:  No  mom declines all vaccines    GROWTH AND DEVELOPMENT:  [x] Yes [] No - Head steady - Sitting  [x] Yes [] No - Eyes follow 180 degrees  [x] Yes [] No - Grasps rattle/resists pull   [x] Yes [] No - Babbles/laughs/squeals  [x] Yes [] No - Rolls stomach to back  [x] Yes [] No - No head lag pulled to sitting  [x] Yes [] No - Brings hands together    Developmental concerns:  No concerns    Social History:  She lives at home with mom, dad and 4 older siblings.    Birth history, medical history, surgical history, and family history reviewed and updated.    PHYSICAL EXAM:  Height 26\" (66 cm), weight 7.201 kg, head circumference 44 cm (17.32\").  GROWTH CURVE PARAMETERS: 72 %ile (Z= 0.57) based on WHO (Girls, 0-2 years) weight-for-age data using vitals from 1/8/2020. 90 %ile (Z= 1.27) based on WHO (Girls, 0-2 years) Length-for-age data based on Length recorded on 1/8/2020. 99 %ile (Z= 2.26) based on WHO (Girls, 0-2 years) head circumference-for-age based on Head Circumference recorded on 1/8/2020.  GENERAL:  Well appearing female infant, nontoxic, no acute distress.  Alert and     interactive.  SKIN: Warm, normal turgor.  No cyanosis.  No bruises or  lesions.  HEAD:  Normocephalic, atraumatic.  Anterior fontanel open, soft and flat.  EYES:  Conjunctivae are non-injected, non-icteric.  Right eye has plugged nasal lacrimal duct with some mild discharge.  NOSE:  Nasal mucosa noncongested.  EARS:  Normal pinnae, no preauricular skin tags or pit.  Tympanic membranes are transparent with good landmarks.  THROAT:  Oropharynx with moist mucous membranes and no lesions.  NECK:  Supple, no lymphadenopathy or masses.  HEART:  Regular rate and rhythm.  Quiet precordium.  Normal S1, S2.  No murmurs, rubs, gallops. Equal femoral pulses.  LUNGS:  Clear to auscultation bilaterally.  No wheezes, rales, rhonchi.  Normal work of breathing.  ABDOMEN:  Soft, nontender.  No organomegaly or masses.  GENITOURINARY:  David 1 female  MUSCULOSKELETAL:  Hips within normal range of motion.  Negative Higuera, Ortolani.  Spine straight.  Normal sacrum.  EXTREMITIES:  Warm, dry, without abnormalities.  NEUROLOGIC:  Normal tone, bulk, strength.    ASSESSMENT:  4 month old female well child.    PLAN:   All parental concerns and questions discussed.  Anticipatory guidance provided, handout given.              Development              Sudden Infant Death Syndrome (SIDS) prevention              Accident prevention: Car seat, scalding, falls, small toys, smothering              No bottle in bed              Analgesics/antipyretics              Sun exposure              Tobacco-free home              Dental care              Lead exposure risk: None              Vitamin D supplementation if breast feeding.    Immunizations per orders.  Risks, benefits, and side effects discussed.  Mom declines all vaccines.  Return to clinic for 6 month well child examination or sooner as needed for illness/concerns.       Assess    Obese  ELSA    Rec:    Weight loss  Nocturnal Bipap while in the hospital  S/N sleep study on discharge  OP FU post S/N sleep study - to be scheduled on discharge

## 2020-09-01 NOTE — PROGRESS NOTE ADULT - SUBJECTIVE AND OBJECTIVE BOX
Subjective:  34yo M resting comfortable on BIPAP, no c/o pain      HPI:  35 year old male, PMH of GERD, HTN, active smoker for 12 years (1/2 PPD), family history of MI (father at 34yo), comes in from [facility]  after presenting with shortness of breath for two days with frothy sputum. Patient had chest pain, with arm pain on his left. CXR showed what looked like pulmonary edema. Patient was given lopressor, lasix, and was started on nitro gtt. ST depressions noted on EKG. Patient taken to the Cath lab and was found to have EF of 30-40%, proximal LAD disease, ostial LAD, and proximal Circ. Patient had a right groin balloon pump inserted. Patient was brought to [facility] for further management. (25 Aug 2020 00:01)          PAST MEDICAL & SURGICAL HISTORY:  History of ear surgery  Abdominal hernia          MEDICATIONS  (STANDING):  acetaminophen  IVPB .. 1000 milliGRAM(s) IV Intermittent once  albuterol/ipratropium for Nebulization 3 milliLiter(s) Nebulizer every 6 hours  aspirin enteric coated 81 milliGRAM(s) Oral daily  atorvastatin 80 milliGRAM(s) Oral at bedtime  buMETAnide Injectable 2 milliGRAM(s) IV Push <User Schedule>  clopidogrel Tablet 75 milliGRAM(s) Oral daily  enoxaparin Injectable 40 milliGRAM(s) SubCutaneous every 12 hours  gabapentin 300 milliGRAM(s) Oral every 8 hours  lidocaine   Patch 2 Patch Transdermal daily  losartan 25 milliGRAM(s) Oral daily  metoprolol tartrate 50 milliGRAM(s) Oral two times a day  pantoprazole    Tablet 40 milliGRAM(s) Oral daily  polyethylene glycol 3350 17 Gram(s) Oral daily  senna 2 Tablet(s) Oral at bedtime  sodium chloride 0.9% lock flush 3 milliLiter(s) IV Push every 8 hours    MEDICATIONS  (PRN):  ALPRAZolam 0.25 milliGRAM(s) Oral every 8 hours PRN anxiety  oxycodone    5 mG/acetaminophen 325 mG 1 Tablet(s) Oral every 4 hours PRN Moderate Pain (4 - 6)  oxycodone    5 mG/acetaminophen 325 mG 2 Tablet(s) Oral every 4 hours PRN Severe Pain (7 - 10)          Allergies    No Known Allergies    Intolerances          WEIGHTS:  Daily     Daily Weight in k.7 (31 Aug 2020 05:00)  Admit Wt: Drug Dosing Weight  Height (cm): 177.8 (24 Aug 2020 20:30)  Weight (kg): 138.7 (24 Aug 2020 20:30)  BMI (kg/m2): 43.9 (24 Aug 2020 20:30)  BSA (m2): 2.5 (24 Aug 2020 20:30)  I&O's Summary    30 Aug 2020 07:  -  31 Aug 2020 07:00  --------------------------------------------------------  IN: 1385 mL / OUT: 2775 mL / NET: -1390 mL    31 Aug 2020 07:01  -  01 Sep 2020 01:13  --------------------------------------------------------  IN: 1400 mL / OUT: 3655 mL / NET: -2255 mL        VITAL SIGNS:  ICU Vital Signs Last 24 Hrs  T(C): 37.2 (31 Aug 2020 16:44), Max: 37.2 (31 Aug 2020 16:44)  T(F): 99 (31 Aug 2020 16:44), Max: 99 (31 Aug 2020 16:44)  HR: 96 (01 Sep 2020 00:00) (85 - 105)  BP: 139/78 (01 Sep 2020 00:00) (120/64 - 168/81)  BP(mean): 98 (01 Sep 2020 00:00) (83 - 112)  ABP: --  ABP(mean): --  RR: 18 (01 Sep 2020 00:00) (16 - 27)  SpO2: 100% (01 Sep 2020 00:00) (88% - 100%)        All laboratory results, radiology and medications reviewed.    LABS:      135  |  95<L>  |  18.0  ----------------------------<  102<H>  4.2   |  29.0  |  0.71    Ca    8.7      31 Aug 2020 03:47  Mg     2.1                                        8.2    15.57 )-----------( 250      ( 31 Aug 2020 03:47 )             26.3              ABG - ( 31 Aug 2020 20:08 )  pH, Arterial: 7.51  pH, Blood: x     /  pCO2: 37    /  pO2: 78    / HCO3: 30    / Base Excess: 5.9   /  SaO2: 97                    CAPILLARY BLOOD GLUCOSE      POCT Blood Glucose.: 97 mg/dL (31 Aug 2020 16:13)  POCT Blood Glucose.: 89 mg/dL (31 Aug 2020 11:17)  POCT Blood Glucose.: 124 mg/dL (31 Aug 2020 07:37)             PHYSICAL EXAM:  General:  obese, no acute distress  Neurology:  alert and oriented X 4, nonfocal, no gross deficits  Respiratory:  clear to auscultation bilaterally  CV:  regular rate and rhythm, normal S1 S2  Abdomen:  obese, soft, nontender, nondistended, positive bowel sounds  Extremities:  warm, well perfused, no edema +DP pulses  Incisions:  midline sternal incision, c/d/i, sternum stable

## 2020-09-01 NOTE — CHART NOTE - NSCHARTNOTEFT_GEN_A_CORE
Source: Patient [x ]  Family [ ]   other [ ]    Current Diet: Diet, Consistent Carbohydrate/No Snacks:   DASH/TLC {Sodium & Cholesteral Restricted} (08-30-20 @ 09:59)      PO intake:  < 50% [ ]   50-75%  [ ]   %  [ x]  other :    Source for PO intake [x ] Patient [ ] family [ ] chart [ ] staff [ ] other    Current Weight:   8/31: 138.7kg     % Weight Change: (N/A) No new wt to assess at this time.      Pertinent Medications: MEDICATIONS  (STANDING):  albuterol/ipratropium for Nebulization 3 milliLiter(s) Nebulizer every 6 hours  aspirin enteric coated 81 milliGRAM(s) Oral daily  atorvastatin 80 milliGRAM(s) Oral at bedtime  buMETAnide Injectable 2 milliGRAM(s) IV Push <User Schedule>  clopidogrel Tablet 75 milliGRAM(s) Oral daily  enoxaparin Injectable 40 milliGRAM(s) SubCutaneous every 12 hours  gabapentin 300 milliGRAM(s) Oral every 8 hours  lidocaine   Patch 2 Patch Transdermal daily  losartan 25 milliGRAM(s) Oral daily  metoprolol tartrate 50 milliGRAM(s) Oral two times a day  pantoprazole    Tablet 40 milliGRAM(s) Oral daily  polyethylene glycol 3350 17 Gram(s) Oral daily  senna 2 Tablet(s) Oral at bedtime  sodium chloride 0.9% lock flush 3 milliLiter(s) IV Push every 8 hours    MEDICATIONS  (PRN):  ALPRAZolam 0.25 milliGRAM(s) Oral every 8 hours PRN anxiety  oxycodone    5 mG/acetaminophen 325 mG 1 Tablet(s) Oral every 4 hours PRN Moderate Pain (4 - 6)  oxycodone    5 mG/acetaminophen 325 mG 2 Tablet(s) Oral every 4 hours PRN Severe Pain (7 - 10)    Pertinent Labs: CBC Full  -  ( 01 Sep 2020 04:36 )  WBC Count : 12.61 K/uL  RBC Count : 3.07 M/uL  Hemoglobin : 8.5 g/dL  Hematocrit : 27.2 %  Platelet Count - Automated : 241 K/uL  Mean Cell Volume : 88.6 fl  Mean Cell Hemoglobin : 27.7 pg  Mean Cell Hemoglobin Concentration : 31.3 gm/dL  Auto Neutrophil # : x  Auto Lymphocyte # : x  Auto Monocyte # : x  Auto Eosinophil # : x  Auto Basophil # : x  Auto Neutrophil % : x  Auto Lymphocyte % : x  Auto Monocyte % : x  Auto Eosinophil % : x  Auto Basophil % : x        Skin: Midsternal incision, R and L EVH sites     Nutrition focused physical exam conducted - found signs of malnutrition [x ]absent [ ]present    Subcutaneous fat loss: [ ] Orbital fat pads region, [ ]Buccal fat region, [ ]Triceps region,  [ ]Ribs region    Muscle wasting: [ ]Temples region, [ ]Clavicle region, [ ]Shoulder region, [ ]Scapula region, [ ]Interosseous region,  [ ]thigh region, [ ]Calf region    Estimated Needs:   [x ] no change since previous assessment  [ ] recalculated:     Current Nutrition Diagnosis:  Pt remains at nutrition risk secondary to increased protein energy needs related to increased physiologic demand to promote healing as evidenced by s/p C5L. Pt is now awake and alert, tolerating diet well. Pt reports not following any specific diets at home. Pt receptive to DASH/TLC diet education. Literature provided and reviewed. Pt with fair to good understanding and expected compliance. Recommendations below:     Recommendations:   1. RX: MVI and Vit. C daily   2. Check wt daily to monitor trends  3. Continue with Diet Rx     Monitoring and Evaluation:   [x ] PO intake [x ] Tolerance to diet prescription [X] Weights  [X] Follow up per protocol [X] Labs:

## 2020-09-02 ENCOUNTER — TRANSCRIPTION ENCOUNTER (OUTPATIENT)
Age: 35
End: 2020-09-02

## 2020-09-02 LAB
ANION GAP SERPL CALC-SCNC: 14 MMOL/L — SIGNIFICANT CHANGE UP (ref 5–17)
BUN SERPL-MCNC: 15 MG/DL — SIGNIFICANT CHANGE UP (ref 8–20)
CALCIUM SERPL-MCNC: 9.3 MG/DL — SIGNIFICANT CHANGE UP (ref 8.6–10.2)
CHLORIDE SERPL-SCNC: 91 MMOL/L — LOW (ref 98–107)
CO2 SERPL-SCNC: 30 MMOL/L — HIGH (ref 22–29)
CREAT SERPL-MCNC: 0.64 MG/DL — SIGNIFICANT CHANGE UP (ref 0.5–1.3)
GLUCOSE SERPL-MCNC: 101 MG/DL — HIGH (ref 70–99)
HCT VFR BLD CALC: 31.2 % — LOW (ref 39–50)
HGB BLD-MCNC: 9.9 G/DL — LOW (ref 13–17)
MAGNESIUM SERPL-MCNC: 2.3 MG/DL — SIGNIFICANT CHANGE UP (ref 1.6–2.6)
MCHC RBC-ENTMCNC: 27.7 PG — SIGNIFICANT CHANGE UP (ref 27–34)
MCHC RBC-ENTMCNC: 31.7 GM/DL — LOW (ref 32–36)
MCV RBC AUTO: 87.2 FL — SIGNIFICANT CHANGE UP (ref 80–100)
PLATELET # BLD AUTO: 436 K/UL — HIGH (ref 150–400)
POTASSIUM SERPL-MCNC: 3.6 MMOL/L — SIGNIFICANT CHANGE UP (ref 3.5–5.3)
POTASSIUM SERPL-SCNC: 3.6 MMOL/L — SIGNIFICANT CHANGE UP (ref 3.5–5.3)
RBC # BLD: 3.58 M/UL — LOW (ref 4.2–5.8)
RBC # FLD: 14.4 % — SIGNIFICANT CHANGE UP (ref 10.3–14.5)
SODIUM SERPL-SCNC: 135 MMOL/L — SIGNIFICANT CHANGE UP (ref 135–145)
WBC # BLD: 13.34 K/UL — HIGH (ref 3.8–10.5)
WBC # FLD AUTO: 13.34 K/UL — HIGH (ref 3.8–10.5)

## 2020-09-02 PROCEDURE — 99232 SBSQ HOSP IP/OBS MODERATE 35: CPT

## 2020-09-02 PROCEDURE — 71045 X-RAY EXAM CHEST 1 VIEW: CPT | Mod: 26

## 2020-09-02 RX ORDER — POTASSIUM CHLORIDE 20 MEQ
40 PACKET (EA) ORAL ONCE
Refills: 0 | Status: COMPLETED | OUTPATIENT
Start: 2020-09-02 | End: 2020-09-02

## 2020-09-02 RX ADMIN — OXYCODONE AND ACETAMINOPHEN 1 TABLET(S): 5; 325 TABLET ORAL at 02:29

## 2020-09-02 RX ADMIN — LOSARTAN POTASSIUM 25 MILLIGRAM(S): 100 TABLET, FILM COATED ORAL at 05:43

## 2020-09-02 RX ADMIN — SODIUM CHLORIDE 3 MILLILITER(S): 9 INJECTION INTRAMUSCULAR; INTRAVENOUS; SUBCUTANEOUS at 05:08

## 2020-09-02 RX ADMIN — ENOXAPARIN SODIUM 40 MILLIGRAM(S): 100 INJECTION SUBCUTANEOUS at 09:48

## 2020-09-02 RX ADMIN — SODIUM CHLORIDE 3 MILLILITER(S): 9 INJECTION INTRAMUSCULAR; INTRAVENOUS; SUBCUTANEOUS at 15:57

## 2020-09-02 RX ADMIN — LIDOCAINE 2 PATCH: 4 CREAM TOPICAL at 00:19

## 2020-09-02 RX ADMIN — OXYCODONE AND ACETAMINOPHEN 1 TABLET(S): 5; 325 TABLET ORAL at 01:29

## 2020-09-02 RX ADMIN — ENOXAPARIN SODIUM 40 MILLIGRAM(S): 100 INJECTION SUBCUTANEOUS at 21:12

## 2020-09-02 RX ADMIN — Medication 3 MILLILITER(S): at 04:29

## 2020-09-02 RX ADMIN — GABAPENTIN 300 MILLIGRAM(S): 400 CAPSULE ORAL at 12:27

## 2020-09-02 RX ADMIN — PANTOPRAZOLE SODIUM 40 MILLIGRAM(S): 20 TABLET, DELAYED RELEASE ORAL at 08:11

## 2020-09-02 RX ADMIN — GABAPENTIN 300 MILLIGRAM(S): 400 CAPSULE ORAL at 05:42

## 2020-09-02 RX ADMIN — Medication 3 MILLILITER(S): at 20:36

## 2020-09-02 RX ADMIN — Medication 40 MILLIEQUIVALENT(S): at 05:42

## 2020-09-02 RX ADMIN — CLOPIDOGREL BISULFATE 75 MILLIGRAM(S): 75 TABLET, FILM COATED ORAL at 08:11

## 2020-09-02 RX ADMIN — Medication 3 MILLILITER(S): at 15:06

## 2020-09-02 RX ADMIN — Medication 100 MILLIGRAM(S): at 18:12

## 2020-09-02 RX ADMIN — ATORVASTATIN CALCIUM 80 MILLIGRAM(S): 80 TABLET, FILM COATED ORAL at 21:12

## 2020-09-02 RX ADMIN — Medication 100 MILLIGRAM(S): at 05:42

## 2020-09-02 RX ADMIN — GABAPENTIN 300 MILLIGRAM(S): 400 CAPSULE ORAL at 21:12

## 2020-09-02 RX ADMIN — SODIUM CHLORIDE 3 MILLILITER(S): 9 INJECTION INTRAMUSCULAR; INTRAVENOUS; SUBCUTANEOUS at 21:12

## 2020-09-02 RX ADMIN — BUMETANIDE 2 MILLIGRAM(S): 0.25 INJECTION INTRAMUSCULAR; INTRAVENOUS at 05:43

## 2020-09-02 RX ADMIN — Medication 81 MILLIGRAM(S): at 08:11

## 2020-09-02 NOTE — DISCHARGE NOTE PROVIDER - NSDCFUSCHEDAPPT_GEN_ALL_CORE_FT
ROBY GUTIÉRREZ ; 09/08/2020 ; N[facility] Cardio [address] ROBY Osorio ; 09/16/2020 ; NPP CT Surg [address] [facility] ROBY GUTIÉRREZ ; 09/08/2020 ; NPP Cardio [address] ROBY Osorio ; 09/16/2020 ; NPP CT Surg [address] [facility]

## 2020-09-02 NOTE — DISCHARGE NOTE PROVIDER - CARE PROVIDERS DIRECT ADDRESSES
,DirectAddress_Unknown,[email or web address] ,DirectAddress_Unknown,[email or web address],DirectAddress_Unknown

## 2020-09-02 NOTE — DISCHARGE NOTE PROVIDER - CARE PROVIDER_API CALL
Raheem Miller)  Surgery; Thoracic and Cardiac Surgery  [address] [place] [address]  Phone: [phone]  Fax: [phone]  Follow Up Time:     Billy Higgins)  Cardiovascular Disease; Internal Medicine; Interventional Cardiology  [address] [place] [address]  Phone: [phone]  Fax: [phone]  Follow Up Time:     Ok Stanton  CRITICAL CARE MEDICINE  [address] [facility], [address]  [place] [address]  Phone: [phone]  Fax: [phone]  Follow Up Time: Raheem Miller)  Surgery; Thoracic and Cardiac Surgery  [address] [place] [address]  Phone: [phone]  Fax: [phone]  Follow Up Time:     Billy Higgins)  Cardiovascular Disease; Internal Medicine; Interventional Cardiology  [address] [place] [address]  Phone: [phone]  Fax: [phone]  Follow Up Time:     Ok Stanton  CRITICAL CARE MEDICINE  [address] [facility], [address]  [place] [address]  Phone: [phone]  Fax: [phone]  Follow Up Time:     Chevy Rausch  SURGERY  [address] [place] [address], [address]  [place] [address]  Phone: [phone]  Fax: [phone]  Follow Up Time:

## 2020-09-02 NOTE — CHART NOTE - NSCHARTNOTEFT_GEN_A_CORE
Pt needs sleep study per pulmonary.  Plan is for discharge home tomorrow.  Discussed with Dr. Stanton.  Plan is for patient to be discharged tomorrow and for an outpatient sleep study tomorrow night.  Per Dr. Stanton, the office will be in touch with the patient for scheduling and instructions.  Pt aware of plan. Received patient from CTICU.  VSS.  NAD noted.  Pt sitting up in chair.      Pt does need a sleep study per pulmonary.  Discussed with Dr. Stanton.  Plan for discharge from CTS standpoint is for tomorrow 9/3.  Per Dr. Stanton he will set patient up to have outpatient sleep study tomorrow night after discharge.  Per Dr. Stanton the office will be in touch with the patient for scheduling and instructions.  Pt aware of plan.

## 2020-09-02 NOTE — PROGRESS NOTE ADULT - SUBJECTIVE AND OBJECTIVE BOX
PULMONARY PROGRESS NOTE      ROBY GUTIÉRREZ  MRN-[id number]    Patient is a 35y old  Male who presents with a chief complaint of chest pain and shortness of breath (02 Sep 2020 05:52)        INTERVAL HPI/OVERNIGHT EVENTS:  Pt seen and examined at the bedside.    MEDICATIONS  (STANDING):  albuterol/ipratropium for Nebulization 3 milliLiter(s) Nebulizer every 6 hours  aspirin enteric coated 81 milliGRAM(s) Oral daily  atorvastatin 80 milliGRAM(s) Oral at bedtime  clopidogrel Tablet 75 milliGRAM(s) Oral daily  enoxaparin Injectable 40 milliGRAM(s) SubCutaneous every 12 hours  gabapentin 300 milliGRAM(s) Oral every 8 hours  losartan 25 milliGRAM(s) Oral daily  metoprolol tartrate 100 milliGRAM(s) Oral every 12 hours  pantoprazole    Tablet 40 milliGRAM(s) Oral daily  polyethylene glycol 3350 17 Gram(s) Oral daily  senna 2 Tablet(s) Oral at bedtime  sodium chloride 0.9% lock flush 3 milliLiter(s) IV Push every 8 hours    MEDICATIONS  (PRN):  acetaminophen   Tablet .. 650 milliGRAM(s) Oral every 6 hours PRN Mild Pain (1 - 3)  ALPRAZolam 0.25 milliGRAM(s) Oral every 8 hours PRN anxiety  oxycodone    5 mG/acetaminophen 325 mG 1 Tablet(s) Oral every 4 hours PRN Moderate Pain (4 - 6)  oxycodone    5 mG/acetaminophen 325 mG 2 Tablet(s) Oral every 4 hours PRN Severe Pain (7 - 10)    Allergies    No Known Allergies    Intolerances    OHS (Unknown)    PAST MEDICAL & SURGICAL HISTORY:  History of ear surgery  Abdominal hernia        REVIEW OF SYSTEMS:  Negative except as noted in HPI      Vital Signs Last 24 Hrs  T(C): 36.7 (02 Sep 2020 15:30), Max: 37.1 (01 Sep 2020 21:41)  T(F): 98.1 (02 Sep 2020 15:30), Max: 98.8 (01 Sep 2020 21:41)  HR: 102 (02 Sep 2020 15:30) (88 - 104)  BP: 127/76 (02 Sep 2020 15:30) (119/66 - 155/65)  BP(mean): 93 (02 Sep 2020 05:00) (87 - 102)  RR: 18 (02 Sep 2020 15:30) (18 - 22)  SpO2: 90% (02 Sep 2020 15:30) (90% - 100%)      PHYSICAL EXAMINATION:  GEN: In no apparent distress, sitting up in chair, communicating easily  HEENT: NC/AT  NECK: Supple, non-tender  CV: +S1, S2, RRR  RESPIRATORY: CTA B/L, good inspiratory effort, non-labored breathing  ABDOMEN: Soft, NT/ND, +BS  EXTREMITIES: No rashes, lesions, or pitting edema noted  NEURO: No focal deficits, grossly intact  PSYCH: Normal affect      LABS:                        9.9    13.34 )-----------( 436      ( 02 Sep 2020 02:07 )             31.2     09-02    135  |  91<L>  |  15.0  ----------------------------<  101<H>  3.6   |  30.0<H>  |  0.64    Ca    9.3      02 Sep 2020 02:07  Mg     2.3     09-02          ABG - ( 31 Aug 2020 20:08 )  pH, Arterial: 7.51  pH, Blood: x     /  pCO2: 37    /  pO2: 78    / HCO3: 30    / Base Excess: 5.9   /  SaO2: 97                              MICROBIOLOGY:  COVID-19  Antibody - for prior infection screening (08.25.20 @ 12:30)    COVID-19 IgG Antibody Index: 0.12: Abbott CMIA  Negative Result    <= 1.39 Index  Positive Result      >= 1.40 Index Index    COVID-19 IgG Antibody Interpretation: Negative: This test has not been reviewed by the FDA by the standard review  process. It has been authorized for emergency use by the FDA. [employer] has validated this test to be accurate.  Negative results do not rule out SARS-CoV-2 infection, particularly in  those who have been in recent contact with the virus. Follow-up testing  with a molecular diagnostic test should be considered to rule out  infection in these individuals.  Results from antibody testing should not be used as thesole basis to  diagnose or exclude SARS-CoV-2 infection, or to inform infection status.  Positive results may rarely be due to past or present infection with  non-SARS-CoV-2 coronavirus strains, such as coronavirus HKU1, NL63, OC43,  or 229E. [employer], through extensive validation  testing, has confirmed that this risk is minimal with this test.        RADIOLOGY & ADDITIONAL STUDIES:  < from: Xray Chest 1 View- PORTABLE-Routine (09.02.20 @ 05:11) >  FINDINGS:  The lungs are clear of airspace consolidations or effusions. No pneumothorax.    There is cardiomegaly. Status post coronary artery bypassgraft procedure. Visualized osseous structures are intact.        IMPRESSION:   No significant interval change..    < end of copied text >      ECHO:  < from: TTE Echo Complete w/o Contrast w/ Doppler (08.25.20 @ 09:59) >  Summary:   1. Technically difficult study with poor endocardial visualization.   2. Endocardial visualization was enhanced with intravenous echo contrast.   3. The left ventricle endocardium is not well visualized despite use of IV echo contrast. Left ventricle systolic function appears moderatedly reduced estimated LVEF   40%. Global left ventricle hypokinesis. Mildly increased septal wall thickness. Moderately increased posterior wall thickness.   4. The right ventricle was not well visualized.   5. No aortic stenosis.   6. Pericardium not well visualized.   7. Recommend clinical correlation with the above findings.    Shelbi Langston MD Electronically signed on 8/25/2020 at 3:35:22 PM    < end of copied text >

## 2020-09-02 NOTE — PROGRESS NOTE ADULT - SUBJECTIVE AND OBJECTIVE BOX
Subjective:  36yo M resting comfortable, agrees to BIPAP nightly.      HPI:  35 year old male, PMH of GERD, HTN, active smoker for 12 years (1/2 PPD), family history of MI (father at 34yo), comes in from [facility]  after presenting with shortness of breath for two days with frothy sputum. Patient had chest pain, with arm pain on his left. CXR showed what looked like pulmonary edema. Patient was given lopressor, lasix, and was started on nitro gtt. ST depressions noted on EKG. Patient taken to the Cath lab and was found to have EF of 30-40%, proximal LAD disease, ostial LAD, and proximal Circ. Patient had a right groin balloon pump inserted. Patient was brought to [facility] for further management. (25 Aug 2020 00:01)          PAST MEDICAL & SURGICAL HISTORY:  History of ear surgery  Abdominal hernia          MEDICATIONS  (STANDING):  albuterol/ipratropium for Nebulization 3 milliLiter(s) Nebulizer every 6 hours  aspirin enteric coated 81 milliGRAM(s) Oral daily  atorvastatin 80 milliGRAM(s) Oral at bedtime  buMETAnide Injectable 2 milliGRAM(s) IV Push <User Schedule>  clopidogrel Tablet 75 milliGRAM(s) Oral daily  enoxaparin Injectable 40 milliGRAM(s) SubCutaneous every 12 hours  gabapentin 300 milliGRAM(s) Oral every 8 hours  lidocaine   Patch 2 Patch Transdermal daily  losartan 25 milliGRAM(s) Oral daily  metoprolol tartrate 100 milliGRAM(s) Oral every 12 hours  pantoprazole    Tablet 40 milliGRAM(s) Oral daily  polyethylene glycol 3350 17 Gram(s) Oral daily  senna 2 Tablet(s) Oral at bedtime  sodium chloride 0.9% lock flush 3 milliLiter(s) IV Push every 8 hours    MEDICATIONS  (PRN):  acetaminophen   Tablet .. 650 milliGRAM(s) Oral every 6 hours PRN Mild Pain (1 - 3)  ALPRAZolam 0.25 milliGRAM(s) Oral every 8 hours PRN anxiety  oxycodone    5 mG/acetaminophen 325 mG 1 Tablet(s) Oral every 4 hours PRN Moderate Pain (4 - 6)  oxycodone    5 mG/acetaminophen 325 mG 2 Tablet(s) Oral every 4 hours PRN Severe Pain (7 - 10)          Allergies    No Known Allergies    Intolerances          WEIGHTS:  Daily     Daily   Admit Wt: Drug Dosing Weight  Height (cm): 177.8 (24 Aug 2020 20:30)  Weight (kg): 138.7 (24 Aug 2020 20:30)  BMI (kg/m2): 43.9 (24 Aug 2020 20:30)  BSA (m2): 2.5 (24 Aug 2020 20:30)  I&O's Summary    31 Aug 2020 07:01  -  01 Sep 2020 07:00  --------------------------------------------------------  IN: 1650 mL / OUT: 4380 mL / NET: -2730 mL    01 Sep 2020 07:01  -  02 Sep 2020 01:06  --------------------------------------------------------  IN: 1560 mL / OUT: 1575 mL / NET: -15 mL        VITAL SIGNS:  ICU Vital Signs Last 24 Hrs  T(C): 37.1 (01 Sep 2020 21:41), Max: 37.6 (01 Sep 2020 16:02)  T(F): 98.8 (01 Sep 2020 21:41), Max: 99.7 (01 Sep 2020 16:02)  HR: 88 (02 Sep 2020 00:00) (88 - 103)  BP: 145/63 (02 Sep 2020 00:00) (108/64 - 152/76)  BP(mean): 90 (02 Sep 2020 00:00) (79 - 105)  ABP: --  ABP(mean): --  RR: 20 (02 Sep 2020 00:00) (18 - 22)  SpO2: 100% (02 Sep 2020 00:00) (94% - 100%)        All laboratory results, radiology and medications reviewed.    LABS:  09-01    136  |  96<L>  |  15.0  ----------------------------<  98  4.6   |  26.0  |  0.57    Ca    8.5<L>      01 Sep 2020 03:07  Mg     2.1     09-01                                   8.5    12.61 )-----------( 241      ( 01 Sep 2020 04:36 )             27.2              ABG - ( 31 Aug 2020 20:08 )  pH, Arterial: 7.51  pH, Blood: x     /  pCO2: 37    /  pO2: 78    / HCO3: 30    / Base Excess: 5.9   /  SaO2: 97                    CAPILLARY BLOOD GLUCOSE      POCT Blood Glucose.: 139 mg/dL (01 Sep 2020 07:34)             PHYSICAL EXAM:  General:  obese, no acute distress  Neurology:  alert and oriented X 4, nonfocal, no gross deficits  Respiratory:  clear to auscultation bilaterally  CV:  regular rate and rhythm, normal S1 S2  Abdomen:  obese, soft, nontender, nondistended, positive bowel sounds  Extremities:  warm, well perfused, no edema +DP pulses  Incisions:  midline sternal incision, c/d/i, sternum stable

## 2020-09-02 NOTE — PROGRESS NOTE ADULT - NEGATIVE CARDIOVASCULAR SYMPTOMS
no chest pain/no palpitations/no peripheral edema
no palpitations/no chest pain
no palpitations/no chest pain/no peripheral edema
no palpitations/no chest pain
no chest pain/no palpitations
no palpitations/no chest pain

## 2020-09-02 NOTE — PROGRESS NOTE ADULT - ASSESSMENT
35 year old male, PMH of GERD, HTN, active smoker for 12 years (1/2 PPD), family history of MI (father at 36yo), [place] [facility] [place] with shortness of breath for two days with frothy sputum. Patient had chest pain, with arm pain on his left. CXR showed what looked like pulmonary edema. Patient was given lopressor, lasix, and was started on nitro gtt. ST depressions noted on EKG. Patient taken to the Cath lab and was found to have EF of 30-40%, proximal LAD disease, ostial LAD, and proximal Circ. Patient had a right groin balloon pump inserted. Patient was brought to [place] for further management.   IABP removed the following day after arrival and prior to surgery.  On 8/28 pt underwent C5L, extubated to bipap secondary to acute on chronic hypoxic resp failure, also with acute blood loss anemia which is notable stable.

## 2020-09-02 NOTE — DISCHARGE NOTE PROVIDER - NSDCCPTREATMENT_GEN_ALL_CORE_FT
PRINCIPAL PROCEDURE  Procedure: CABG, with CLARICE  Findings and Treatment: C5 L ( SVG-RCA, SVG-RAMUS. SVG-OM3, SVG- Diag)

## 2020-09-02 NOTE — PROGRESS NOTE ADULT - NEGATIVE RESPIRATORY AND THORAX SYMPTOMS
no cough/no wheezing
no dyspnea/no wheezing
no wheezing/no dyspnea/no cough
no cough/no wheezing
no wheezing/no dyspnea
no cough/no wheezing
no wheezing/no cough

## 2020-09-02 NOTE — PROGRESS NOTE ADULT - ASSESSMENT
Assessment:  -Obesity - morbid  -ELSA    Rec:  -Weight loss  -Nocturnal BPAP while in the hospital  -Pt scheduled for overnight split-night sleep study on 9/3/2020  -No pulmonary contraindication for discharge    Benji Black M.D.  Pulmonary & Critical Care Attending  [facility]  Pulmonary Medicine at [place]  [address]  [place], [place] [address]  T: [phone]  F: [phone]

## 2020-09-02 NOTE — DISCHARGE NOTE PROVIDER - NSDCFUADDINST_GEN_ALL_CORE_FT
Please call the Cardiothoracic Surgery office at [phone] if you are experiencing any shortness of breath, chest pain, fevers or chills, drainage from the incisions, persistent nausea, vomiting or if you have any questions about your medications. If the symptoms are severe, call 911 and go to the nearest hospital. You can also call [phone] for an emergency [facility] ambulance, which will take you to the closest [facility].    If you need any assistance for making any appointments for a new consult or referral in any specialty, please call our [facility] Clinical Coordination Center at [phone].      Efforts to Avoid COVID-19 Infection:    When your body is in a weakened state, such as after major heart or lung surgery, you could be more susceptible to infection and those infections could quickly become severe. Don’t be frightened but do stay home.      1. Take precaution    Currently, they advise at-risk populations to: avoid large crowds, stock up on supplies (especially medications), wash hands often (for a minimum of 20 seconds), stay home as much as possible and practice social distancing (six feet is recommended) when outside the home.    2. Use technology and this includes the phone    Applications like [employer], [employer], texting and even an old-fashioned phone call can keep those at-risk connected to family, friends and replace in-person participation with real-time interaction at family gatherings or social functions, until it is safe to resume in-person activity. It also keeps those at-risk connected to care providers. It’s important to remember that social distancing may be recommended but social isolation is not.    3. Be Knowledgeable    Know the emergency warning signs for COVID-19. If you notice any of the following symptoms: fevers, difficulty breathing or shortness of breath; persistent chest pain or pressure or new confusion – make sure you seek immediate medical attention.    4. Stay Up-to-Date    Pay attention to the latest up-to-date news regarding the situation and what is happening in the community so you can respond accordingly.    Your recovery is as dependent on continuing to move and be active as it is to avoid infection.     We encourage you to go outside and take brief walks, but while we are in a state of emergency and COVID-19 is on the rise, please limit contact with the public.

## 2020-09-02 NOTE — PROGRESS NOTE ADULT - NEGATIVE GASTROINTESTINAL SYMPTOMS
no nausea/no vomiting/no constipation/no diarrhea
no nausea/no vomiting/no constipation/no diarrhea
no vomiting/no nausea/no diarrhea/no constipation
no constipation/no vomiting/no diarrhea/no nausea
no diarrhea/no nausea/no vomiting
no diarrhea/no nausea/no vomiting/no constipation

## 2020-09-02 NOTE — PROGRESS NOTE ADULT - ATTENDING COMMENTS
A total of 25 minutes were spent on the entire encounter. Greater than 50% of the time was spent reviewing labs, notes, orders, radiographic studies, as well as counseling and coordinating care with the relevant multidisciplinary team, including with the primary and consulting providers.
Patient seen and examined by me.  I have discussed my recommendation with the PA which are outlined above.  Will follow.
Patient seen and examined by me.  I have discussed my recommendation with the PA which are outlined above.  Will follow.
I have personally seen, examined, and participated in the care of this patient. I have reviewed all pertinent clinical information, including history, physical exam, plan, and the PA/NP's note and agree except as noted below.    Doing well after CABG and is weaned off of all pressors.  Currently on BiPAP and still has chest tubes.  Agree with management by CT Surgery team.  Patient to follow up with Dr. Mckee for cardiology follow up.    Thank you for this consultation. Cardiology will sign off. Please contact with any questions.    Antony Arreola MD  Clinical Cardiac Electrophysiology

## 2020-09-02 NOTE — DISCHARGE NOTE PROVIDER - PROVIDER TOKENS
PROVIDER:[TOKEN:[[id number]]],PROVIDER:[TOKEN:[[id number]]],PROVIDER:[TOKEN:[[id number]]] PROVIDER:[TOKEN:[[id number]]],PROVIDER:[TOKEN:[[id number]]],PROVIDER:[TOKEN:[[id number]]],PROVIDER:[TOKEN:[[id number]]]

## 2020-09-02 NOTE — DISCHARGE NOTE PROVIDER - NSDCACTIVITY_GEN_ALL_CORE
No heavy lifting/straining/Do not drive or operate machinery/Walking - Outdoors allowed/Do not make important decisions/Stairs allowed/Showering allowed/Walking - Indoors allowed

## 2020-09-02 NOTE — DISCHARGE NOTE PROVIDER - NSDCCPCAREPLAN_GEN_ALL_CORE_FT
PRINCIPAL DISCHARGE DIAGNOSIS  Diagnosis: CAD, multiple vessel  Assessment and Plan of Treatment: 1. Take ALL of your medications as ordered. Fill your prescriptions the day you are discharged and take according to the schedule you were given. Continue to take a stool softener if you are taking narcotic pain medications. AVOID medications such as ibuprofen or naproxen if you have had bypass surgery. If you have any questions or are unable to fill the prescriptions, please call the office right away at [phone].  2. Shower daily. Clean all incisions daily while showering with warm water and mild soap, pat dry with a clean towel and do not cover with any dressings unless instructed to. No bathing, swimming in a pool or the ocean until instructed by MD.  DO NOT use creams or lotions on the wound.  3. We advise that you do not drive until instructed by MD.   4. You may not return to work until instructed by MD.   5. Please eat a low fat, low cholesterol, low salt diet. (No added/extra salt)  6. Weigh yourself every day in the morning and record it in the weight log in your red folder. Notify the office of any weight gain more than 2-3 pounds in 24 hours.  7. Continue breathing exercises several times a day. Continue to use your heart pillow.  8. No heavy lifting nothing greater than 5 pounds until cleared by MD.   9. Call / Notify MD any fever greater than 101.0, any drainage from incisions or if they become red, hot or very tender to the touch.  10. Increase activity as tolerated. Walk indoors and/or outdoors at least 3 times a day.      SECONDARY DISCHARGE DIAGNOSES  Diagnosis: Acute systolic (congestive) heart failure  Assessment and Plan of Treatment: Take all medications as prescribed and listed on your discharge paperwork.  Please follow up with your Cardiologist and Primary Care Physician 2-4 weeks from discharge.    Diagnosis: HTN (hypertension)  Assessment and Plan of Treatment: Take all medications as prescribed and listed on your discharge paperwork.  Please follow up with your Cardiologist and Primary Care Physician 2-4 weeks from discharge.    Diagnosis: Obstructive sleep apnea  Assessment and Plan of Treatment: Patient will see Dr. Stanton for a sleep study on the day of discharge from the hospital.    Diagnosis: Obesity, unspecified classification, unspecified obesity type, unspecified whether serious comorbidity present  Assessment and Plan of Treatment: Please follow up for your sleep study.   Weight loss advised.

## 2020-09-02 NOTE — DISCHARGE NOTE PROVIDER - NSDCHHNEEDSERVICE_GEN_ALL_CORE
Teaching and training/Wound care and assessment/Medication teaching and assessment/Observation and assessment

## 2020-09-02 NOTE — DISCHARGE NOTE PROVIDER - HOSPITAL COURSE
35 year old male patient with a medical history of GERD, HTN, active smoker for 12 years (1/2 PPD), family history of MI (father at 36yo), came in from [facility] [place] [place] after presenting with shortness of breath for two days with frothy sputum. Patient had chest pain, with arm pain on his left. CXR showed what looked like pulmonary edema. Patient was given lopressor, lasix, and was started on nitro gtt. ST depressions noted on EKG. Patient taken to the Cath lab and was found to have EF of 30-40%, proximal LAD disease, ostial LAD, and proximal Circ. Patient had a right groin balloon pump inserted. Patient was brought to [place] for further management. IABP removed the following day after arrival and prior to surgery.  On 8/28 pt underwent CABG X 5 (SVG-RCA, SVG-RAMUS, SVG-OM3, SVG- Diag). Patient was extubated to bipap secondary to acute on chronic hypoxic respiratory failure, also with acute blood loss anemia which is notably stable. Patient is hemodynamically stable at this time and is stable for discharge as per Dr. Miller. 35 year old male patient with a medical history of GERD, HTN, active smoker for 12 years (1/2 PPD), family history of MI (father at 36yo), came in [place] [facility] [place] presenting with shortness of breath for two days with frothy sputum. Patient had chest pain, with arm pain on his left. CXR showed what looked like pulmonary edema. Patient was given lopressor, lasix, and was started on nitro gtt. ST depressions noted on EKG. Patient taken to the Cath lab and was found to have EF of 30-40%, proximal LAD disease, ostial LAD, and proximal Circ. Patient had a right groin balloon pump inserted. Patient was brought to [place] for further management. IABP removed the following day after arrival and prior to surgery.  On 8/28 pt underwent CABG X 5 (SVG-RCA, SVG-RAMUS, SVG-OM3, SVG- Diag). Patient was extubated to bipap secondary to acute on chronic hypoxic respiratory failure, also with acute blood loss anemia which is notably stable. Patient is hemodynamically stable at this time and is stable for discharge as per Dr. iMller.         Exam    Alert Awake NAD    Decreased at bases b/l     RRR S1 S2     soft NT ND + BS     MSI C/D/I + staples     Buttock - Left side + large indurated area + erythema + tenderness + warm + excortication noted  No oozing noted         Plan    General surgery consult appreciated.  Pt to be discharged on Keflex and f/u with Dr Jackson in one week    d/c Sternal hari     Dr Miller aware

## 2020-09-02 NOTE — DISCHARGE NOTE PROVIDER - NSDCMRMEDTOKEN_GEN_ALL_CORE_FT
acetaminophen 325 mg oral tablet: 2 tab(s) orally every 6 hours, As needed, Mild Pain (1 - 3)  aspirin 81 mg oral delayed release tablet: 1 tab(s) orally once a day  atorvastatin 80 mg oral tablet: 1 tab(s) orally once a day (at bedtime)  clopidogrel 75 mg oral tablet: 1 tab(s) orally once a day  gabapentin 300 mg oral capsule: 1 cap(s) orally every 8 hours  Keflex 250 mg oral capsule: 1 cap(s) orally 2 times a day   losartan 25 mg oral tablet: 1 tab(s) orally once a day  metoprolol succinate 100 mg oral tablet, extended release: 1 tab(s) orally 2 times a day  pantoprazole 40 mg oral delayed release tablet: 1 tab(s) orally once a day  potassium chloride 20 mEq oral tablet, extended release: 1 tab(s) orally once a day   senna oral tablet: 2 tab(s) orally once a day (at bedtime)  torsemide 20 mg oral tablet: 1 tab(s) orally once a day

## 2020-09-03 ENCOUNTER — OUTPATIENT (OUTPATIENT)
Dept: OUTPATIENT SERVICES | Facility: HOSPITAL | Age: 35
LOS: 1 days | End: 2020-09-03
Payer: COMMERCIAL

## 2020-09-03 ENCOUNTER — TRANSCRIPTION ENCOUNTER (OUTPATIENT)
Age: 35
End: 2020-09-03

## 2020-09-03 VITALS
RESPIRATION RATE: 18 BRPM | TEMPERATURE: 98 F | HEART RATE: 102 BPM | OXYGEN SATURATION: 98 % | DIASTOLIC BLOOD PRESSURE: 81 MMHG | SYSTOLIC BLOOD PRESSURE: 141 MMHG

## 2020-09-03 DIAGNOSIS — K46.9 UNSPECIFIED ABDOMINAL HERNIA WITHOUT OBSTRUCTION OR GANGRENE: Chronic | ICD-10-CM

## 2020-09-03 DIAGNOSIS — G47.33 OBSTRUCTIVE SLEEP APNEA (ADULT) (PEDIATRIC): ICD-10-CM

## 2020-09-03 PROBLEM — Z98.890 OTHER SPECIFIED POSTPROCEDURAL STATES: Chronic | Status: ACTIVE | Noted: 2020-08-25

## 2020-09-03 LAB
ALBUMIN SERPL ELPH-MCNC: 3.8 G/DL — SIGNIFICANT CHANGE UP (ref 3.3–5.2)
ALP SERPL-CCNC: 66 U/L — SIGNIFICANT CHANGE UP (ref 40–120)
ALT FLD-CCNC: 71 U/L — HIGH
ANION GAP SERPL CALC-SCNC: 17 MMOL/L — SIGNIFICANT CHANGE UP (ref 5–17)
AST SERPL-CCNC: 91 U/L — HIGH
BILIRUB SERPL-MCNC: 0.6 MG/DL — SIGNIFICANT CHANGE UP (ref 0.4–2)
BUN SERPL-MCNC: 12 MG/DL — SIGNIFICANT CHANGE UP (ref 8–20)
CALCIUM SERPL-MCNC: 9.6 MG/DL — SIGNIFICANT CHANGE UP (ref 8.6–10.2)
CHLORIDE SERPL-SCNC: 91 MMOL/L — LOW (ref 98–107)
CO2 SERPL-SCNC: 28 MMOL/L — SIGNIFICANT CHANGE UP (ref 22–29)
CREAT SERPL-MCNC: 0.66 MG/DL — SIGNIFICANT CHANGE UP (ref 0.5–1.3)
GLUCOSE SERPL-MCNC: 109 MG/DL — HIGH (ref 70–99)
HCT VFR BLD CALC: 31.8 % — LOW (ref 39–50)
HGB BLD-MCNC: 10 G/DL — LOW (ref 13–17)
MAGNESIUM SERPL-MCNC: 2 MG/DL — SIGNIFICANT CHANGE UP (ref 1.6–2.6)
MCHC RBC-ENTMCNC: 27.1 PG — SIGNIFICANT CHANGE UP (ref 27–34)
MCHC RBC-ENTMCNC: 31.4 GM/DL — LOW (ref 32–36)
MCV RBC AUTO: 86.2 FL — SIGNIFICANT CHANGE UP (ref 80–100)
PHOSPHATE SERPL-MCNC: 4 MG/DL — SIGNIFICANT CHANGE UP (ref 2.4–4.7)
PLATELET # BLD AUTO: 516 K/UL — HIGH (ref 150–400)
POTASSIUM SERPL-MCNC: 3.3 MMOL/L — LOW (ref 3.5–5.3)
POTASSIUM SERPL-SCNC: 3.3 MMOL/L — LOW (ref 3.5–5.3)
PROT SERPL-MCNC: 7.4 G/DL — SIGNIFICANT CHANGE UP (ref 6.6–8.7)
RBC # BLD: 3.69 M/UL — LOW (ref 4.2–5.8)
RBC # FLD: 14.6 % — HIGH (ref 10.3–14.5)
SARS-COV-2 RNA SPEC QL NAA+PROBE: SIGNIFICANT CHANGE UP
SODIUM SERPL-SCNC: 136 MMOL/L — SIGNIFICANT CHANGE UP (ref 135–145)
WBC # BLD: 15.34 K/UL — HIGH (ref 3.8–10.5)
WBC # FLD AUTO: 15.34 K/UL — HIGH (ref 3.8–10.5)

## 2020-09-03 PROCEDURE — 94010 BREATHING CAPACITY TEST: CPT

## 2020-09-03 PROCEDURE — 86923 COMPATIBILITY TEST ELECTRIC: CPT

## 2020-09-03 PROCEDURE — 93880 EXTRACRANIAL BILAT STUDY: CPT

## 2020-09-03 PROCEDURE — 84295 ASSAY OF SERUM SODIUM: CPT

## 2020-09-03 PROCEDURE — 80053 COMPREHEN METABOLIC PANEL: CPT

## 2020-09-03 PROCEDURE — 84100 ASSAY OF PHOSPHORUS: CPT

## 2020-09-03 PROCEDURE — 97116 GAIT TRAINING THERAPY: CPT

## 2020-09-03 PROCEDURE — 95810 POLYSOM 6/> YRS 4/> PARAM: CPT

## 2020-09-03 PROCEDURE — 94660 CPAP INITIATION&MGMT: CPT

## 2020-09-03 PROCEDURE — 83880 ASSAY OF NATRIURETIC PEPTIDE: CPT

## 2020-09-03 PROCEDURE — 93312 ECHO TRANSESOPHAGEAL: CPT

## 2020-09-03 PROCEDURE — 82803 BLOOD GASES ANY COMBINATION: CPT

## 2020-09-03 PROCEDURE — 85730 THROMBOPLASTIN TIME PARTIAL: CPT

## 2020-09-03 PROCEDURE — 85610 PROTHROMBIN TIME: CPT

## 2020-09-03 PROCEDURE — 87640 STAPH A DNA AMP PROBE: CPT

## 2020-09-03 PROCEDURE — 82947 ASSAY GLUCOSE BLOOD QUANT: CPT

## 2020-09-03 PROCEDURE — 83605 ASSAY OF LACTIC ACID: CPT

## 2020-09-03 PROCEDURE — 87635 SARS-COV-2 COVID-19 AMP PRB: CPT

## 2020-09-03 PROCEDURE — P9037: CPT

## 2020-09-03 PROCEDURE — 93306 TTE W/DOPPLER COMPLETE: CPT

## 2020-09-03 PROCEDURE — 93010 ELECTROCARDIOGRAM REPORT: CPT

## 2020-09-03 PROCEDURE — 94760 N-INVAS EAR/PLS OXIMETRY 1: CPT

## 2020-09-03 PROCEDURE — 86850 RBC ANTIBODY SCREEN: CPT

## 2020-09-03 PROCEDURE — C1889: CPT

## 2020-09-03 PROCEDURE — 84132 ASSAY OF SERUM POTASSIUM: CPT

## 2020-09-03 PROCEDURE — 86900 BLOOD TYPING SEROLOGIC ABO: CPT

## 2020-09-03 PROCEDURE — 94002 VENT MGMT INPAT INIT DAY: CPT

## 2020-09-03 PROCEDURE — 94640 AIRWAY INHALATION TREATMENT: CPT

## 2020-09-03 PROCEDURE — 82962 GLUCOSE BLOOD TEST: CPT

## 2020-09-03 PROCEDURE — 93005 ELECTROCARDIOGRAM TRACING: CPT

## 2020-09-03 PROCEDURE — 85018 HEMOGLOBIN: CPT

## 2020-09-03 PROCEDURE — 81001 URINALYSIS AUTO W/SCOPE: CPT

## 2020-09-03 PROCEDURE — 84484 ASSAY OF TROPONIN QUANT: CPT

## 2020-09-03 PROCEDURE — 82435 ASSAY OF BLOOD CHLORIDE: CPT

## 2020-09-03 PROCEDURE — 83735 ASSAY OF MAGNESIUM: CPT

## 2020-09-03 PROCEDURE — 36430 TRANSFUSION BLD/BLD COMPNT: CPT

## 2020-09-03 PROCEDURE — 82550 ASSAY OF CK (CPK): CPT

## 2020-09-03 PROCEDURE — C1713: CPT

## 2020-09-03 PROCEDURE — 85027 COMPLETE CBC AUTOMATED: CPT

## 2020-09-03 PROCEDURE — 97530 THERAPEUTIC ACTIVITIES: CPT

## 2020-09-03 PROCEDURE — 87641 MR-STAPH DNA AMP PROBE: CPT

## 2020-09-03 PROCEDURE — 83036 HEMOGLOBIN GLYCOSYLATED A1C: CPT

## 2020-09-03 PROCEDURE — 84480 ASSAY TRIIODOTHYRONINE (T3): CPT

## 2020-09-03 PROCEDURE — 86769 SARS-COV-2 COVID-19 ANTIBODY: CPT

## 2020-09-03 PROCEDURE — 84443 ASSAY THYROID STIM HORMONE: CPT

## 2020-09-03 PROCEDURE — 80048 BASIC METABOLIC PNL TOTAL CA: CPT

## 2020-09-03 PROCEDURE — 36600 WITHDRAWAL OF ARTERIAL BLOOD: CPT

## 2020-09-03 PROCEDURE — 85014 HEMATOCRIT: CPT

## 2020-09-03 PROCEDURE — 85576 BLOOD PLATELET AGGREGATION: CPT

## 2020-09-03 PROCEDURE — 86901 BLOOD TYPING SEROLOGIC RH(D): CPT

## 2020-09-03 PROCEDURE — 71045 X-RAY EXAM CHEST 1 VIEW: CPT

## 2020-09-03 PROCEDURE — 82330 ASSAY OF CALCIUM: CPT

## 2020-09-03 PROCEDURE — 84439 ASSAY OF FREE THYROXINE: CPT

## 2020-09-03 PROCEDURE — 36415 COLL VENOUS BLD VENIPUNCTURE: CPT

## 2020-09-03 PROCEDURE — 71045 X-RAY EXAM CHEST 1 VIEW: CPT | Mod: 26

## 2020-09-03 RX ORDER — ATORVASTATIN CALCIUM 80 MG/1
1 TABLET, FILM COATED ORAL
Qty: 30 | Refills: 0
Start: 2020-09-03

## 2020-09-03 RX ORDER — METOPROLOL TARTRATE 50 MG
1 TABLET ORAL
Qty: 60 | Refills: 0
Start: 2020-09-03

## 2020-09-03 RX ORDER — CLOPIDOGREL BISULFATE 75 MG/1
1 TABLET, FILM COATED ORAL
Qty: 30 | Refills: 0
Start: 2020-09-03

## 2020-09-03 RX ORDER — SENNA PLUS 8.6 MG/1
2 TABLET ORAL
Qty: 0 | Refills: 0 | DISCHARGE
Start: 2020-09-03

## 2020-09-03 RX ORDER — LOSARTAN POTASSIUM 100 MG/1
1 TABLET, FILM COATED ORAL
Qty: 30 | Refills: 0
Start: 2020-09-03

## 2020-09-03 RX ORDER — ASPIRIN/CALCIUM CARB/MAGNESIUM 324 MG
1 TABLET ORAL
Qty: 30 | Refills: 0
Start: 2020-09-03

## 2020-09-03 RX ORDER — POTASSIUM CHLORIDE 20 MEQ
1 PACKET (EA) ORAL
Qty: 7 | Refills: 0
Start: 2020-09-03 | End: 2020-09-09

## 2020-09-03 RX ORDER — PANTOPRAZOLE SODIUM 20 MG/1
1 TABLET, DELAYED RELEASE ORAL
Qty: 14 | Refills: 0
Start: 2020-09-03 | End: 2020-09-16

## 2020-09-03 RX ORDER — GABAPENTIN 400 MG/1
1 CAPSULE ORAL
Qty: 90 | Refills: 0
Start: 2020-09-03

## 2020-09-03 RX ORDER — METOPROLOL TARTRATE 50 MG
100 TABLET ORAL
Refills: 0 | Status: DISCONTINUED | OUTPATIENT
Start: 2020-09-03 | End: 2020-09-03

## 2020-09-03 RX ORDER — POTASSIUM CHLORIDE 20 MEQ
40 PACKET (EA) ORAL
Refills: 0 | Status: COMPLETED | OUTPATIENT
Start: 2020-09-03 | End: 2020-09-03

## 2020-09-03 RX ORDER — CEPHALEXIN 500 MG
1 CAPSULE ORAL
Qty: 14 | Refills: 0
Start: 2020-09-03 | End: 2020-09-09

## 2020-09-03 RX ORDER — ACETAMINOPHEN 500 MG
2 TABLET ORAL
Qty: 60 | Refills: 0
Start: 2020-09-03

## 2020-09-03 RX ADMIN — POLYETHYLENE GLYCOL 3350 17 GRAM(S): 17 POWDER, FOR SOLUTION ORAL at 11:33

## 2020-09-03 RX ADMIN — Medication 3 MILLILITER(S): at 03:58

## 2020-09-03 RX ADMIN — Medication 40 MILLIGRAM(S): at 05:17

## 2020-09-03 RX ADMIN — SODIUM CHLORIDE 3 MILLILITER(S): 9 INJECTION INTRAMUSCULAR; INTRAVENOUS; SUBCUTANEOUS at 05:11

## 2020-09-03 RX ADMIN — Medication 81 MILLIGRAM(S): at 11:31

## 2020-09-03 RX ADMIN — CLOPIDOGREL BISULFATE 75 MILLIGRAM(S): 75 TABLET, FILM COATED ORAL at 11:33

## 2020-09-03 RX ADMIN — ENOXAPARIN SODIUM 40 MILLIGRAM(S): 100 INJECTION SUBCUTANEOUS at 09:37

## 2020-09-03 RX ADMIN — LOSARTAN POTASSIUM 25 MILLIGRAM(S): 100 TABLET, FILM COATED ORAL at 05:17

## 2020-09-03 RX ADMIN — Medication 100 MILLIGRAM(S): at 05:17

## 2020-09-03 RX ADMIN — GABAPENTIN 300 MILLIGRAM(S): 400 CAPSULE ORAL at 05:17

## 2020-09-03 RX ADMIN — PANTOPRAZOLE SODIUM 40 MILLIGRAM(S): 20 TABLET, DELAYED RELEASE ORAL at 11:33

## 2020-09-03 RX ADMIN — Medication 40 MILLIEQUIVALENT(S): at 11:31

## 2020-09-03 RX ADMIN — Medication 3 MILLILITER(S): at 08:16

## 2020-09-03 RX ADMIN — Medication 40 MILLIEQUIVALENT(S): at 10:16

## 2020-09-03 NOTE — PROGRESS NOTE ADULT - PROBLEM SELECTOR PLAN 2
Instructed patient on weight loss. Patient may also have undiagnosed sleep apnea given body habitus
Pulmonary consulted, recommend:  Continue nocturnal BIPAP while in hospital.  Weight loss.  S/N sleep study on discharge.  Outpatient follow up post S/N sleep study - to be scheduled on discharge.
Continue Lopressor 50 mg TID  Norvasc 2.5 mg started, titrate as needed for SBP < 130
Nitro gtt at bedside, currently not infusing  Hydralazine 10 mg IV given x 2 for /105, now  controlled at 135/75
Resolved
Resolved
Pulmonary consulted, recommend:  Continue nocturnal BIPAP while in hospital  Weight loss  S/N sleep study on discharge  OP FU post S/N sleep study - to be scheduled on discharge
Pulmonary consulted, thank you  Continue nocturnal BIPAP  Recommend weight loss, will assist pt with outpatient recommendations  Pulmonary will continue to follow up as outpatient, will need formal studies for ELSA
Resolved

## 2020-09-03 NOTE — DISCHARGE NOTE NURSING/CASE MANAGEMENT/SOCIAL WORK - NSDCPEEMAIL_GEN_ALL_CORE
[facility] for Tobacco Control email [email or web address]

## 2020-09-03 NOTE — CONSULT NOTE ADULT - SUBJECTIVE AND OBJECTIVE BOX
36 yo M with HTN, GERD admitted on 8/25 for shortness of breath. Underwent CABGx5 on 8/28. Surgery consulted to evaluate for L buttock pain. Patient states he has had pain for a few days now and initially attributed it to being in bed after his surgery. He denies history of abscesses. Denies drainage, bleeding from the area, hematochezia, melena.     Of note, patient had WBC of 15 today. Given steroids luis-operatively.    PAST MEDICAL & SURGICAL HISTORY:  History of ear surgery  Abdominal hernia    FAMILY HISTORY:  Non-contributory    Social History:  Smoker for 12 years (1/2 PPD)    Allergies    No Known Allergies    Intolerances    OHS (Unknown)    Home Medications:  senna oral tablet: 2 tab(s) orally once a day (at bedtime) (03 Sep 2020 13:18)    Vital Signs Last 24 Hrs  T(C): 36.9 (03 Sep 2020 09:57), Max: 36.9 (03 Sep 2020 09:57)  T(F): 98.5 (03 Sep 2020 09:57), Max: 98.5 (03 Sep 2020 09:57)  HR: 102 (03 Sep 2020 09:57) (91 - 102)  BP: 141/81 (03 Sep 2020 09:57) (119/78 - 141/81)  BP(mean): --  RR: 18 (03 Sep 2020 09:57) (18 - 20)  SpO2: 98% (03 Sep 2020 09:57) (94% - 98%)      Physical Exam:  General: AOx3  : Induration of L buttock region, no fluctuance, no erythema, excoriations from patient scratching area. No signs of abscess.    LABS:                         10.0   15.34 )-----------( 516      ( 03 Sep 2020 06:47 )             31.8     09-03    136  |  91<L>  |  12.0  ----------------------------<  109<H>  3.3<L>   |  28.0  |  0.66    Ca    9.6      03 Sep 2020 06:47  Phos  4.0     09-03  Mg     2.0     09-03    TPro  7.4  /  Alb  3.8  /  TBili  0.6  /  DBili  x   /  AST  91<H>  /  ALT  71<H>  /  AlkPhos  66  09-03

## 2020-09-03 NOTE — PROGRESS NOTE ADULT - REASON FOR ADMISSION
chest pain and shortness of breath

## 2020-09-03 NOTE — PROGRESS NOTE ADULT - PROBLEM SELECTOR PROBLEM 4
Smoking hx
Obesity, unspecified classification, unspecified obesity type, unspecified whether serious comorbidity present
Gastroesophageal reflux disease, esophagitis presence not specified
Obesity, unspecified classification, unspecified obesity type, unspecified whether serious comorbidity present
Gastroesophageal reflux disease, esophagitis presence not specified

## 2020-09-03 NOTE — DISCHARGE NOTE NURSING/CASE MANAGEMENT/SOCIAL WORK - NSDCPEWEB_GEN_ALL_CORE
[facility] for Tobacco Control website --- [email or web address] --- [email or web address]

## 2020-09-03 NOTE — PROGRESS NOTE ADULT - PROBLEM SELECTOR PLAN 4
Encouraged smoking cessation
Instructed patient on weight loss. Patient may also have undiagnosed sleep apnea given body habitus, will complete sleep study upon discharge.
Instructed patient on weight loss. Patient may also have undiagnosed sleep apnea given body habitus
Instructed patient on weight loss. Patient may also have undiagnosed sleep apnea given body habitus
continue PPI
Instructed patient on weight loss. Patient may also have undiagnosed sleep apnea given body habitus
Instructed patient on weight loss. Patient may also have undiagnosed sleep apnea given body habitus
Instructed patient on weight loss. Patient may also have undiagnosed sleep apnea given body habitus, will complete sleep study upon discharge.
continue PPI

## 2020-09-03 NOTE — PROGRESS NOTE ADULT - PROBLEM SELECTOR PLAN 1
s/p balloon pump removal  OR 8/28   type and screen   blood on hold   periop abx   NPO after midnight
S/p C5L on 8/28.  Neurologically intact.   Hemodynamically stable.   Continue Lopressor.  Continue ASA and Plavix for graft patency.  Continue Statin for anti inflammatory properties.  Encourage incentive spirometry hourly while awake, coughing, CDBE.   Continue to nocturnal BIPAP for support. Patient will be discharged to an outpatient sleep study to be evaluated for ELSA/outpatient CPAP use.   Continue to ambulate with PT assist daily.  Pain control PRN.   Bowel regimen PRN.
s/p Non-STEMI on 8/24, Cath with TVD  Plan for CABG 8/28, pre op workup in progress  Trend P2Y12   Pt was loaded with Brilinta during cardiac cath  Continue ASA 81 mg daily  IABP d/c'd  Heparin gtt off
s/p Non-STEMI on 8/24, Cath with TVD  Plan for CABG, pre op workup in progress  P2Y12 15, repeat pending this am  Pt was loaded with Brilinta during cardiac cath  Continue ASA 81 mg daily  IABP @ 1:1, will most likely wean and d/c later this am  Heparin gtt off since midnight.
s/p C5L on 8/28  Inotropic and pressor support weaned off  Lopressor 5 mg IV q 6  ASA and Plavix for graft patency  Statin for anti inflammatory properties  Encourage IS hourly while awake  May continue to need nocturnal bipap for support  Ambulate with PT assist later today
s/p C5L on 8/28  Tolerating Lopressor  ASA and Plavix for graft patency  Statin for anti inflammatory properties  Encourage IS hourly while awake  Continue to nocturnal BIPAP for support  Ambulate with PT assist daily
s/p C5L on 8/28  Tolerating Lopressor  ASA and Plavix for graft patency  Statin for anti inflammatory properties  Encourage IS hourly while awake  Continue to nocturnal BIPAP for support  Ambulate with PT assist daily  Pt may transfer to floor when bed available.
s/p C5L on 8/28  Epi weaning to off  Minimal pressor support with Levophed  Will start Lopressor when off inotropic and pressor support  ASA and Plavix for graft patency  Statin for anti inflammatory properties  Encourage IS hourly while awake  May continue to need nocturnal bipap for support  Ambulate with PT assist later today
s/p C5L on 8/28  tolerating betablocker  ASA and Plavix for graft patency  Statin for anti inflammatory properties  Encourage IS hourly while awake  May continue to need nocturnal bipap for support  Ambulate with PT assist later today

## 2020-09-03 NOTE — CONSULT NOTE ADULT - ASSESSMENT
36 yo M with GERD, HTN admitted for SOB, now post-op CABG. Surgery consulted for L buttock pain. Upon examination, no drainable abscess could be appreciated.    Plan:  Patient educated on using warm compresses  Follow up in one week with Dr. Miracle Valentin for 7 days  No imaging required

## 2020-09-03 NOTE — PROGRESS NOTE ADULT - ASSESSMENT
35 year old male, PMH of GERD, HTN, active smoker for 12 years (1/2 PPD), family history of MI (father at 36yo), comes in from [facility] [place] after presenting with shortness of breath for two days with frothy sputum. Patient had chest pain, with arm pain on his left. CXR showed what looked like pulmonary edema. Patient was given lopressor, lasix, and was started on nitro gtt. ST depressions noted on EKG. Patient taken to the Cath lab and was found to have EF of 30-40%, proximal LAD disease, ostial LAD, and proximal Circ. Patient had a right groin balloon pump inserted. Patient was brought to [place] for further management on 8/25.  IABP removed the following day after arrival and prior to surgery.  On 8/28 pt underwent C5L, extubated to bipap secondary to acute on chronic hypoxic respiratory failure (resolved), also with acute blood loss anemia (stable). Patient required BiPAP at night while in the hospital probably secondary to undiagnosed ELSA. Patient to be discharged with an appointment for an outpatient sleep study later today.

## 2020-09-03 NOTE — PROGRESS NOTE ADULT - PROVIDER SPECIALTY LIST ADULT
CT Surgery
Cardiology
Pulmonology
Pulmonology
CT Surgery

## 2020-09-03 NOTE — PROGRESS NOTE ADULT - PROBLEM SELECTOR PLAN 3
continue PPI
Resolved.  Continue losartan, lopressor, and torsemide.
Patient with right groin balloon pump 1:1 pressure control  Pt diuresed with Lasix aggressively
Resolved
Instructed patient on weight loss. Patient may also have undiagnosed sleep apnea given body habitus
Pt has severe anxiety  Continue xanax q 8, not prn, may need to up titrate dose
Resolved
Resolved
Instructed patient on weight loss. Patient may also have undiagnosed sleep apnea given body habitus

## 2020-09-03 NOTE — PROGRESS NOTE ADULT - PROBLEM SELECTOR PROBLEM 1
CAD (coronary artery disease)
Acute systolic (congestive) heart failure
CAD (coronary artery disease)

## 2020-09-03 NOTE — PROGRESS NOTE ADULT - PROBLEM SELECTOR PLAN 7
Pantoprazole for GI prophylaxis  SCDs, Lovenox BID for DVT prophylaxis
Pantoprazole for GI prophylaxis.  SCDs, Lovenox for DVT prophylaxis.    Dispo: Plan to discharge patient home later today with appointment for outpatient sleep study scheduled for later today with Dr. Hutton.   Plan to be discussed with CT surgery attending / team on AM rounds.
Pantoprazole for GI prophylaxis  SCDs for DVT prophylaxis
Pantoprazole for GI prophylaxis  SCDs, Lovenox for DVT prophylaxis
Pantoprazole for GI prophylaxis  SCDs, Lovenox for DVT prophylaxis
Pantoprazole for GI prophylaxis  SCDs for DVT prophylaxis

## 2020-09-03 NOTE — PROGRESS NOTE ADULT - PROBLEM SELECTOR PROBLEM 6
Smoking hx
Prophylactic measure
Smoking hx
Smoking hx
Prophylactic measure

## 2020-09-03 NOTE — CONSULT NOTE ADULT - REASON FOR ADMISSION
chest pain and shortness of breath

## 2020-09-03 NOTE — PROGRESS NOTE ADULT - SUBJECTIVE AND OBJECTIVE BOX
POD # 6 s/p CABG X 5 (SVG-RCA, SVG-RAMUS, SVG-OM3, SVG- Diag)    Patient is a 35y old  Male who presents with a chief complaint of chest pain and shortness of breath (02 Sep 2020 16:37)    HPI:  35 year old male, PMH of GERD, HTN, active smoker for 12 years (1/2 PPD), family history of MI (father at 34yo), comes in from [facility] after presenting with shortness of breath for two days with frothy sputum. Patient had chest pain, with arm pain on his left. CXR showed what looked like pulmonary edema. Patient was given lopressor, lasix, and was started on nitro gtt. ST depressions noted on EKG. Patient taken to the Cath lab and was found to have EF of 30-40%, proximal LAD disease, ostial LAD, and proximal Circ. Patient had a right groin balloon pump inserted. Patient was brought to [place] for further management. (25 Aug 2020 00:01)    PAST MEDICAL & SURGICAL HISTORY:  History of ear surgery  Abdominal hernia    Significant recent/past 24 hr events: None    Subjective: Patient lying in bed in no acute distress. Used BiPAP overnight. Denies fevers, chills, lightheadedness, dizziness, HA, CP, palpitations, SOB, cough, abdominal pain, N/V, diarrhea, numbness/tingling in extremities, or any other acute complaints. ROS negative x 10 systems except as noted above.    MEDICATIONS  (STANDING):  albuterol/ipratropium for Nebulization 3 milliLiter(s) Nebulizer every 6 hours  aspirin enteric coated 81 milliGRAM(s) Oral daily  atorvastatin 80 milliGRAM(s) Oral at bedtime  clopidogrel Tablet 75 milliGRAM(s) Oral daily  enoxaparin Injectable 40 milliGRAM(s) SubCutaneous every 12 hours  gabapentin 300 milliGRAM(s) Oral every 8 hours  losartan 25 milliGRAM(s) Oral daily  metoprolol tartrate 100 milliGRAM(s) Oral every 12 hours  pantoprazole    Tablet 40 milliGRAM(s) Oral daily  polyethylene glycol 3350 17 Gram(s) Oral daily  senna 2 Tablet(s) Oral at bedtime  sodium chloride 0.9% lock flush 3 milliLiter(s) IV Push every 8 hours  torsemide 40 milliGRAM(s) Oral daily    MEDICATIONS  (PRN):  acetaminophen   Tablet .. 650 milliGRAM(s) Oral every 6 hours PRN Mild Pain (1 - 3)  ALPRAZolam 0.25 milliGRAM(s) Oral every 8 hours PRN anxiety  oxycodone    5 mG/acetaminophen 325 mG 1 Tablet(s) Oral every 4 hours PRN Moderate Pain (4 - 6)  oxycodone    5 mG/acetaminophen 325 mG 2 Tablet(s) Oral every 4 hours PRN Severe Pain (7 - 10)    Allergies: No Known Allergies    Vitals   T(C): 36.4 (02 Sep 2020 21:00), Max: 37.1 (02 Sep 2020 02:00)  T(F): 97.5 (02 Sep 2020 21:00), Max: 98.8 (02 Sep 2020 02:00)  HR: 91 (02 Sep 2020 21:32) (88 - 104)  BP: 119/78 (02 Sep 2020 21:00) (119/78 - 155/65)  BP(mean): 93 (02 Sep 2020 05:00) (93 - 100)  RR: 20 (02 Sep 2020 21:00) (18 - 20)  SpO2: 95% (02 Sep 2020 21:32) (90% - 100%)    I&O's Detail    01 Sep 2020 07:01  -  02 Sep 2020 07:00  --------------------------------------------------------  IN:    Oral Fluid: 1800 mL  Total IN: 1800 mL    OUT:    Voided: 2250 mL  Total OUT: 2250 mL    Total NET: -450 mL      02 Sep 2020 07:01  -  03 Sep 2020 01:37  --------------------------------------------------------  IN:  Total IN: 0 mL    OUT:    Voided: 2000 mL  Total OUT: 2000 mL    Total NET: -2000 mL    Physical Exam  Neuro: A+O x 3, non-focal, speech clear and intact  HEENT:  NCAT, PERRL, EOMI. No conjuctival edema or icterus, no thrush.    Neck:  Supple, trachea midline  Pulm: CTA, good air entry, equal bilaterally, no rales/rhonchi/wheezing, no accessory muscle use noted  CV: regular rate, regular rhythm, +S1S2, no murmur or rub noted  Abd: obese, soft, NT, ND, + BS  Ext: LINDSEY x 4, +1-2 pitting edema b/l, no cyanosis or clubbing, distal motor/neuro/circ intact  Skin: warm, dry, well perfused  Incisions: midsternal incision with +staples c/d/i, open to air, sternum stable, no click appreciated    LABS                        9.9    13.34 )-----------( 436      ( 02 Sep 2020 02:07 )             31.2     09-02    135  |  91<L>  |  15.0  ----------------------------<  101<H>  3.6   |  30.0<H>  |  0.64    Ca    9.3      02 Sep 2020 02:07  Mg     2.3     09-02      Last CXR:  < from: Xray Chest 1 View- PORTABLE-Routine (09.02.20 @ 05:11) >  FINDINGS:  The lungs are clear of airspace consolidations or effusions. No pneumothorax.  There is cardiomegaly. Status post coronary artery bypassgraft procedure. Visualized osseous structures are intact.  IMPRESSION:   No significant interval change..  < end of copied text >

## 2020-09-03 NOTE — DISCHARGE NOTE NURSING/CASE MANAGEMENT/SOCIAL WORK - PATIENT PORTAL LINK FT
You can access the FollowMyHealth Patient Portal offered by [facility] by registering at the following website: [email or web address]. By joining [employer]’s FollowMyHealth portal, you will also be able to view your health information using other applications (apps) compatible with our system.

## 2020-09-03 NOTE — PROGRESS NOTE ADULT - PROBLEM SELECTOR PROBLEM 2
Obesity, unspecified classification, unspecified obesity type, unspecified whether serious comorbidity present
Obstructive sleep apnea
HTN (hypertension)
HTN (hypertension)
Acute systolic (congestive) heart failure
Obstructive sleep apnea
Obstructive sleep apnea
Acute systolic (congestive) heart failure
Acute systolic (congestive) heart failure

## 2020-09-03 NOTE — PROGRESS NOTE ADULT - PROBLEM SELECTOR PROBLEM 5
Prophylactic measure
Gastroesophageal reflux disease, esophagitis presence not specified
Smoking hx
Gastroesophageal reflux disease, esophagitis presence not specified
Smoking hx

## 2020-09-03 NOTE — PROGRESS NOTE ADULT - PROBLEM SELECTOR PROBLEM 3
Gastroesophageal reflux disease, esophagitis presence not specified
Acute systolic (congestive) heart failure
Obesity, unspecified classification, unspecified obesity type, unspecified whether serious comorbidity present
Anxiety
Acute systolic (congestive) heart failure
Acute systolic (congestive) heart failure
Obesity, unspecified classification, unspecified obesity type, unspecified whether serious comorbidity present

## 2020-09-03 NOTE — PROGRESS NOTE ADULT - PROBLEM SELECTOR PLAN 6
Encouraged smoking cessation
Encouraged smoking cessation.  Smoking cessation education to be provided again on discharge.
Encouraged smoking cessation
Pantoprazole for GI prophylaxis  SCDs, Lovenox for DVT prophylaxis
Encouraged smoking cessation
Encouraged smoking cessation
Pantoprazole for GI prophylaxis  SCDs, Lovenox for DVT prophylaxis
Encouraged smoking cessation

## 2020-09-03 NOTE — PROGRESS NOTE ADULT - PROBLEM SELECTOR PLAN 5
GI ppx   Scds  on heparin gtt for balloon pump
Continue PPI.
Continue Pantoprazole
Continue Pantoprazole
Encouraged smoking cessation
Continue Pantoprazole
continue PPI
continue PPI
Encouraged smoking cessation

## 2020-09-03 NOTE — PROGRESS NOTE ADULT - NUTRITIONAL ASSESSMENT
Seen by Dietary, Recommendations:     1. RX: MVI and Vit. C daily   2. Check wt daily to monitor trends  3. Continue with Diet Rx
Seen by Dietary, Recommendations:     1. RX: MVI and Vit. C daily   2. Check wt daily to monitor trends  3. Continue with Diet Rx

## 2020-09-03 NOTE — DISCHARGE NOTE NURSING/CASE MANAGEMENT/SOCIAL WORK - NSDCFUADDAPPT_GEN_ALL_CORE_FT
Please follow up with Dr. Miller on ___________  at [facility].     **Please arrive at [facility] ONE HOUR PRIOR TO APPOINTMENT TIME to get a CHEST XRAY (script in folder). Go directly to the Radiology Department.***    The cardiac surgery office is on the second floor of [facility]. If further directions are needed, please ask for assistance at the .    Please call the CT Surgery office at [phone] if your appointment needs to be rescheduled.    Your Care Navigator Nurse Practitioner will be in touch to see you in your home within a few days from discharge. The Follow Your Heart program can help ensure you understand your medications, discharge instructions and answer any questions you may have at that time. They are also a great source to address concerns during the day and may be reached at [phone].    Please follow up with your Pulmonologist Dr. Stanton for a sleep study straight from being discharged from the hospital.    Please follow up with your Cardiologist and Primary Care Physician 2-4 weeks from discharge.

## 2020-09-08 ENCOUNTER — APPOINTMENT (OUTPATIENT)
Dept: CARE COORDINATION | Facility: HOME HEALTH | Age: 35
End: 2020-09-08
Payer: COMMERCIAL

## 2020-09-08 VITALS — HEIGHT: 70 IN | WEIGHT: 290 LBS | BODY MASS INDEX: 41.52 KG/M2

## 2020-09-08 PROCEDURE — 99024 POSTOP FOLLOW-UP VISIT: CPT

## 2020-09-08 NOTE — ASSESSMENT
[FreeTextEntry1] : Pt recovering well at home s/p OHS. Reviewed all medications and dosages with pt understanding. Pt has all medications in home and is taking as prescribed. Pain controlled with current medication regimen. Pt c/o insomnia related to anxiety and "mind racing" Pt requesting Xanax RX as he had good relief of symptoms inpatient. Advised pt on relaxation techniques, advised he can try Tylenol PM as directed before ordering Xanax. Pt refused a referral to our  program regarding anxiety, he states this has been an ongoing issue prior to surgery. Pt encouraged to reach out to NP should he have no improvement in insomnia. Pt also with numbness in arm and hand with "pins and needles to fingers" Not cold to touch, pink and warm currently. Likely bracial plexus, pt encouraged to continue hand exercises as advised last week. Pt advised he may increase gabapentin to 300 mg  2 tabs at HS to improve symptoms as they tend to be worse in the evening.  No further new symptoms, issues or concerns to report at this time.\par

## 2020-09-08 NOTE — REVIEW OF SYSTEMS
[Heart Rate Is Slow] : the heart rate was not slow [Heart Rate Is Fast] : the heart rate was not fast [Chest Pain] : no chest pain [Palpitations] : no palpitations [Lower Ext Edema] : no extremity edema [Leg Claudication] : no intermittent leg claudication [Shortness Of Breath] : no shortness of breath [Wheezing] : no wheezing [Cough] : no cough [SOB on Exertion] : shortness of breath during exertion [Orthopnea] : no orthopnea [Skin Wound] : skin wound [PND] : no PND [Suicidal] : not suicidal [Sleep Disturbances] : sleep disturbances [Anxiety] : anxiety [Depression] : no depression [Negative] : Musculoskeletal [FreeTextEntry7] : last BM today [de-identified] : left buttock with small blisters that are now dried scabs as per pt and pt wife. Pt states improvement of symptoms. B/L LE SVG harvest sites without erythema, warmth or drainage, edges well approximated

## 2020-09-08 NOTE — REASON FOR VISIT
[Follow-Up: _____] : a [unfilled] follow-up visit [Spouse] : spouse [FreeTextEntry1] : FOLLOW YOUR HEART- Transitional Care Management Program- [facility]\par \par

## 2020-09-08 NOTE — HISTORY OF PRESENT ILLNESS
[Other Location: e.g. Home (Enter Location, City,State)___] : at [unfilled] [Home] : at home, [unfilled] , at the time of the visit. [Spouse] : spouse [Verbal consent obtained from patient] : the patient, [unfilled] [FreeTextEntry4] : Bertha [FreeTextEntry1] : 35 YOM with pmhx including  Morbid obesity, GERD, Active smoker, father had MI at 35\par Hospital Course/Pertinent medications: 8/24 Transfer from [facility], presented with SOB, orthopnea, pink frothy sputum, CP, L arm tingles, ST depressions, +Trops, Pulm edema, admitted to ICU. Cath EF 30-40%, TVD, pt underwent C5L with Dr. Miller on 8/28/20. Post op course included cont'd neuropathic pain, blister on left buttocks, followed by gen surg team, Keflex prescribed. Pt discharged home with support of wife. Pt refused home care services as his insurance does not cover. Initial Counts include 234 beds at the [facility] visit completed via Telehealth. \par CC "I"m doing ok, I just can't sleep"

## 2020-09-08 NOTE — PHYSICAL EXAM
[Neck Appearance] : the appearance of the neck was normal [Sclera] : the sclera and conjunctiva were normal [Exaggerated Use Of Accessory Muscles For Inspiration] : no accessory muscle use [Respiration, Rhythm And Depth] : normal respiratory rhythm and effort [Chest Visual Inspection Thoracic Asymmetry] : no chest asymmetry [FreeTextEntry1] : MSI and CT sites without erythema, drainage or warmth, with edges well approximated. Sternum stable [FreeTextEntry2] : B/L LE without edema, B/L calves soft, NT [Abnormal Walk] : normal gait [] : no rash [Skin Turgor] : normal skin turgor [Skin Color & Pigmentation] : normal skin color and pigmentation [Affect] : the affect was normal [Oriented To Time, Place, And Person] : oriented to person, place, and time [Impaired Insight] : insight and judgment were intact

## 2020-09-15 ENCOUNTER — APPOINTMENT (OUTPATIENT)
Dept: CARDIOLOGY | Facility: CLINIC | Age: 35
End: 2020-09-15
Payer: MEDICARE

## 2020-09-15 ENCOUNTER — NON-APPOINTMENT (OUTPATIENT)
Age: 35
End: 2020-09-15

## 2020-09-15 VITALS
WEIGHT: 294 LBS | DIASTOLIC BLOOD PRESSURE: 78 MMHG | BODY MASS INDEX: 42.09 KG/M2 | HEIGHT: 70 IN | TEMPERATURE: 98.4 F | HEART RATE: 91 BPM | SYSTOLIC BLOOD PRESSURE: 124 MMHG | OXYGEN SATURATION: 97 %

## 2020-09-15 PROCEDURE — 93000 ELECTROCARDIOGRAM COMPLETE: CPT

## 2020-09-15 PROCEDURE — 99215 OFFICE O/P EST HI 40 MIN: CPT

## 2020-09-15 RX ORDER — TORSEMIDE 20 MG/1
20 TABLET ORAL
Refills: 0 | Status: DISCONTINUED | COMMUNITY
End: 2020-09-15

## 2020-09-15 RX ORDER — CEPHALEXIN 250 MG/1
250 CAPSULE ORAL
Refills: 0 | Status: DISCONTINUED | COMMUNITY
End: 2020-09-15

## 2020-09-15 NOTE — DISCUSSION/SUMMARY
[FreeTextEntry1] : 1) Remain on all current meds.\par 2) Echo next month to assess LV function\par 3) F/u visit in 6 weeks

## 2020-09-15 NOTE — REASON FOR VISIT
[Follow-Up - From Hospitalization] : follow-up of a recent hospitalization for [Cardiomyopathy] : cardiomyopathy [Coronary Artery Disease] : coronary artery disease [Hypertension] : hypertension [Hyperlipidemia] : hyperlipidemia [FreeTextEntry1] : I saw this obese 35-year-old man in follow-up consultation on 09/15/20\par 3 weeks ago he presented to the hospital with chest pain and congestive heart failure, was diuresed and underwent cardiac cath revealing triple-vessel disease, got a balloon pump, was transferred to [facility], and had a quadruple bypass procedure.\par He seems to have recovered well, with some tingling of his fingers from a ulnar nerve injury, has a bedsore on his back, and has discomfort in his left calf from where the vein was removed.

## 2020-09-15 NOTE — PHYSICAL EXAM
[General Appearance - Well Developed] : well developed [Normal Appearance] : normal appearance [Well Groomed] : well groomed [No Deformities] : no deformities [General Appearance - Well Nourished] : well nourished [Normal Conjunctiva] : the conjunctiva exhibited no abnormalities [General Appearance - In No Acute Distress] : no acute distress [Eyelids - No Xanthelasma] : the eyelids demonstrated no xanthelasmas [Normal Oral Mucosa] : normal oral mucosa [No Oral Cyanosis] : no oral cyanosis [No Oral Pallor] : no oral pallor [Normal Jugular Venous V Waves Present] : normal jugular venous V waves present [Normal Jugular Venous A Waves Present] : normal jugular venous A waves present [Respiration, Rhythm And Depth] : normal respiratory rhythm and effort [No Jugular Venous De Santiago A Waves] : no jugular venous de santiago A waves [Auscultation Breath Sounds / Voice Sounds] : lungs were clear to auscultation bilaterally [Exaggerated Use Of Accessory Muscles For Inspiration] : no accessory muscle use [Heart Rate And Rhythm] : heart rate and rhythm were normal [Heart Sounds] : normal S1 and S2 [Murmurs] : no murmurs present [Abdomen Soft] : soft [Abdomen Tenderness] : non-tender [Abdomen Mass (___ Cm)] : no abdominal mass palpated [Abnormal Walk] : normal gait [Nail Clubbing] : no clubbing of the fingernails [Gait - Sufficient For Exercise Testing] : the gait was sufficient for exercise testing [Cyanosis, Localized] : no localized cyanosis [Petechial Hemorrhages (___cm)] : no petechial hemorrhages [Skin Color & Pigmentation] : normal skin color and pigmentation [Skin Lesions] : no skin lesions [] : no rash [No Venous Stasis] : no venous stasis [No Xanthoma] : no  xanthoma was observed [No Skin Ulcers] : no skin ulcer [Oriented To Time, Place, And Person] : oriented to person, place, and time [Affect] : the affect was normal [Mood] : the mood was normal [No Anxiety] : not feeling anxious

## 2020-09-16 ENCOUNTER — APPOINTMENT (OUTPATIENT)
Dept: CARDIOTHORACIC SURGERY | Facility: CLINIC | Age: 35
End: 2020-09-16
Payer: COMMERCIAL

## 2020-09-16 ENCOUNTER — RESULT REVIEW (OUTPATIENT)
Age: 35
End: 2020-09-16

## 2020-09-16 ENCOUNTER — OUTPATIENT (OUTPATIENT)
Dept: OUTPATIENT SERVICES | Facility: HOSPITAL | Age: 35
LOS: 1 days | End: 2020-09-16
Payer: SELF-PAY

## 2020-09-16 VITALS
HEIGHT: 70 IN | TEMPERATURE: 98.1 F | RESPIRATION RATE: 15 BRPM | BODY MASS INDEX: 40.8 KG/M2 | SYSTOLIC BLOOD PRESSURE: 142 MMHG | OXYGEN SATURATION: 94 % | WEIGHT: 285 LBS | HEART RATE: 86 BPM | DIASTOLIC BLOOD PRESSURE: 87 MMHG

## 2020-09-16 DIAGNOSIS — Z82.49 FAMILY HISTORY OF ISCHEMIC HEART DISEASE AND OTHER DISEASES OF THE CIRCULATORY SYSTEM: ICD-10-CM

## 2020-09-16 DIAGNOSIS — Z87.891 PERSONAL HISTORY OF NICOTINE DEPENDENCE: ICD-10-CM

## 2020-09-16 DIAGNOSIS — K46.9 UNSPECIFIED ABDOMINAL HERNIA WITHOUT OBSTRUCTION OR GANGRENE: Chronic | ICD-10-CM

## 2020-09-16 DIAGNOSIS — I25.10 ATHEROSCLEROTIC HEART DISEASE OF NATIVE CORONARY ARTERY WITHOUT ANGINA PECTORIS: ICD-10-CM

## 2020-09-16 PROCEDURE — 71046 X-RAY EXAM CHEST 2 VIEWS: CPT

## 2020-09-16 PROCEDURE — 99024 POSTOP FOLLOW-UP VISIT: CPT

## 2020-09-16 PROCEDURE — 71046 X-RAY EXAM CHEST 2 VIEWS: CPT | Mod: 26

## 2020-10-05 ENCOUNTER — TRANSCRIPTION ENCOUNTER (OUTPATIENT)
Age: 35
End: 2020-10-05

## 2020-10-08 ENCOUNTER — EMERGENCY (EMERGENCY)
Facility: HOSPITAL | Age: 35
LOS: 1 days | Discharge: HOME | End: 2020-10-08
Attending: EMERGENCY MEDICINE
Payer: SELF-PAY

## 2020-10-08 ENCOUNTER — APPOINTMENT (OUTPATIENT)
Dept: CARDIOLOGY | Facility: CLINIC | Age: 35
End: 2020-10-08

## 2020-10-08 VITALS
RESPIRATION RATE: 20 BRPM | OXYGEN SATURATION: 97 % | WEIGHT: 285.06 LBS | SYSTOLIC BLOOD PRESSURE: 127 MMHG | HEIGHT: 70 IN | TEMPERATURE: 98 F | HEART RATE: 96 BPM | DIASTOLIC BLOOD PRESSURE: 86 MMHG

## 2020-10-08 DIAGNOSIS — M79.605 PAIN IN LEFT LEG: ICD-10-CM

## 2020-10-08 DIAGNOSIS — Z95.1 PRESENCE OF AORTOCORONARY BYPASS GRAFT: Chronic | ICD-10-CM

## 2020-10-08 DIAGNOSIS — K46.9 UNSPECIFIED ABDOMINAL HERNIA WITHOUT OBSTRUCTION OR GANGRENE: Chronic | ICD-10-CM

## 2020-10-08 PROCEDURE — 99284 EMERGENCY DEPT VISIT MOD MDM: CPT

## 2020-10-08 PROCEDURE — 93971 EXTREMITY STUDY: CPT | Mod: 26,LT

## 2020-10-08 PROCEDURE — 93971 EXTREMITY STUDY: CPT

## 2020-10-08 PROCEDURE — 99284 EMERGENCY DEPT VISIT MOD MDM: CPT | Mod: 25

## 2020-10-08 RX ORDER — METHOCARBAMOL 500 MG/1
2 TABLET, FILM COATED ORAL
Qty: 18 | Refills: 0
Start: 2020-10-08 | End: 2020-10-10

## 2020-10-08 RX ORDER — OXYCODONE AND ACETAMINOPHEN 5; 325 MG/1; MG/1
1 TABLET ORAL ONCE
Refills: 0 | Status: DISCONTINUED | OUTPATIENT
Start: 2020-10-08 | End: 2020-10-08

## 2020-10-08 RX ADMIN — OXYCODONE AND ACETAMINOPHEN 1 TABLET(S): 5; 325 TABLET ORAL at 14:49

## 2020-10-08 NOTE — ED STATDOCS - PATIENT PORTAL LINK FT
You can access the FollowMyHealth Patient Portal offered by [facility] by registering at the following website: [email or web address]. By joining [employer]’s FollowMyHealth portal, you will also be able to view your health information using other applications (apps) compatible with our system.

## 2020-10-08 NOTE — PROGRESS NOTE ADULT - PROBLEM SELECTOR PLAN 1
Doppler ultrasound r/o DVT  Consider uric acid level  Consider foot xray and/or MRI to evaluate for tendinitis, fracture or muscle tear.  Will d/w Dr Miller

## 2020-10-08 NOTE — ED STATDOCS - SKIN, MLM
skin normal color for race, warm, dry and intact. no warmth or erythema of leg: healing incision to proximal/ medial aspect of calf sonya

## 2020-10-08 NOTE — ED STATDOCS - NS_ ATTENDINGSCRIBEDETAILS _ED_A_ED_FT
I, Jony Nuno, performed the initial face to face bedside interview with this patient regarding history of present illness, review of symptoms and relevant past medical, social and family history.  I completed an independent physical examination.  I was the provider who initially evaluated this patient.  The history, relevant review of systems, past medical and surgical history, medical decision making, and physical examination was documented by the scribe in my presence and I attest to the accuracy of the documentation. Follow-up on ordered tests (ie labs, radiologic studies) and re-evaluation of the patient's status has been communicated to the ACP.  Disposition of the patient will be based on test outcome and response to ED interventions.

## 2020-10-08 NOTE — PROGRESS NOTE ADULT - SUBJECTIVE AND OBJECTIVE BOX
Patient well known to service h/o GERD, smoking and CAD s/p C5L on 8/28/20. Patient reports noting a pain on the bottom of his L foot immediately after surgery as if he is "stepping on a ball" when he stands, on the ball of his foot. He noted some pain postoperatively around the site of the saphenous vein harvest, which was removed endoscopically from the L leg from ankle to groin with incisions at ankle and groin (stab) and a small incision below knee. He reports bruising occurring spontaneously after discharge home from mid calf to foot/ankle and then experienced worsening pain and extreme tenderness in the distal shin to medial calf extended proximally along the medial calf and svg harvest site into thigh. It has been progressively worse over past two weeks. He reports most sensitive areas existing around the site of the distal stab incision extended anteriorly and posteriorly. He reports that it has affected his ambulation and can't wear sneakers anymore. He also noticed significant swelling of the LLE foot extending to mid shin, worse at night when foot has been dependent for a while. He reports only taking Tylenol and no longer taking narcotics. He has been compliant with not taking NSAIDs post CT surgery but does admit to taking edibles to treat the pain. He is accompanied by his wife.     VITAL SIGNS  Vital Signs Last 24 Hrs  T(C): 36.8 (10-08-20 @ 11:53), Max: 36.8 (10-08-20 @ 11:53)  T(F): 98.3 (10-08-20 @ 11:53), Max: 98.3 (10-08-20 @ 11:53)  HR: 96 (10-08-20 @ 11:53) (96 - 96)  BP: 127/86 (10-08-20 @ 11:53) (127/86 - 127/86)  RR: 20 (10-08-20 @ 11:53) (20 - 20)  SpO2: 97% (10-08-20 @ 11:53) (97% - 97%)  on RA             MEDICATIONS      PHYSICAL EXAM  General: well nourished, well developed, no acute distress  Neurology: alert and oriented x 3, nonfocal, no gross deficits  Respiratory: clear to auscultation bilaterally  CV: regular rate and rhythm, normal S1, S2  Abdomen: soft, nontender, nondistended, positive bowel sounds   Extremities: warm, well perfused. + DP and PT pulses, good cap refill, slight erythema noted b/l toes, slightly moreso on left. trace-+1 edema left ankle to shin. No edema noted on right. Tenderness noted over left anterior shin extending medially to calf and proximally to medial thigh. (worse around ankle) as well as tenderness of mid-distal plantar region L foot.  Incisions: midline sternal incision, healing well, sternum intact, no click noted. All SVG site incisions well healed. No hematomas, erythema, ecchymosis, dehiscence or drainage.        Weights:  Daily Height in cm: 177.8 (08 Oct 2020 11:53)    Daily   Admit Wt: Drug Dosing Weight  Height (cm): 177.8 (08 Oct 2020 11:53)  Weight (kg): 129.3 (08 Oct 2020 11:53)  BMI (kg/m2): 40.9 (08 Oct 2020 11:53)  BSA (m2): 2.43 (08 Oct 2020 11:53)    All laboratory results, radiology and medications reviewed.    LABS                          CAPILLARY BLOOD GLUCOSE             PAST MEDICAL & SURGICAL HISTORY:  History of ear surgery    Abdominal hernia

## 2020-10-08 NOTE — ED STATDOCS - CLINICAL SUMMARY MEDICAL DECISION MAKING FREE TEXT BOX
Pt with calf tenderness, will do ultra-sound to r/o DVT. chronic left leg pain with dependent edema in setting of recent saphenous harvesting for CABG:  US to eval for possible DVT

## 2020-10-08 NOTE — ED STATDOCS - CARE PROVIDER_API CALL
Benji Pantoja)  Orthopaedic Surgery  [address] [place] [address]  Phone: [phone]  Fax: [phone]  Follow Up Time:

## 2020-10-08 NOTE — ED STATDOCS - NSFOLLOWUPINSTRUCTIONS_ED_ALL_ED_FT
Take robaxin as prescribed for pain    Return to ED if you develop any new or worsening symptoms    Follow up with Dr. Pantoja if symptoms persist

## 2020-10-08 NOTE — ED ADULT NURSE NOTE - OBJECTIVE STATEMENT
A&Ox4, RR even and unlabored. Skin is warm and dry.  C/O left leg pain for the last 2 weeks.  Leg is tender to touch and swollen.  No redness to site.  Recent quadruplet bypass surgery on september 4ths.  Left leg had grafting done.  Denies SOB or chest pain.  safety maintained.

## 2020-10-08 NOTE — ED STATDOCS - MUSCULOSKELETAL, MLM
Right calf tenderness, tenderness to the anterior aspect of the right leg Right calf tenderness, tenderness to the anterior aspect of the right leg with light palpation

## 2020-10-08 NOTE — PROGRESS NOTE ADULT - ASSESSMENT
35M s/p CABG x 5 with LLE saphenous vein harvest presents with increasing left lower leg and foot pain and swelling, worse over past two weeks.

## 2020-10-08 NOTE — ED STATDOCS - PROGRESS NOTE DETAILS
CT ICU PA will see pt Case d/w CTICU PA: will see pt PA NOTE: No acute findings on imaging. Advised pt to follow up with Orthopadic MD if symptoms persist. labs and imaging reviewed with pt. given good instructions when to return to ED and importance of f/u.  all incidental findings were discussed with pt as well. pt verbalized understanding.

## 2020-10-08 NOTE — ED STATDOCS - OBJECTIVE STATEMENT
34 y/o M with PMHx of abdominal hernia presents to the ED c/o worsening LLE pain. Pt states that he had surgery to his bypass surgery and had arteries taking out of his LLE (9/04). Pt was prescribed gabapentin (2-3) pills a day. Pt also reports swelling to the LLE.   Denies CP, SOB, CABG 8/28/20.  Reports progressively worsening left leg pain since procedure with intermittent swelling. taking gabaentin for pain 200-300mg twice daily with little relief.  reports swelling worse in the evening compared to the night.  Leg is very sensitive.  Denies chest pain pleuritic chest discomfort or SOB.

## 2020-10-08 NOTE — ED ADULT NURSE NOTE - NSIMPLEMENTINTERV_GEN_ALL_ED
Implemented All Universal Safety Interventions:  [place] to call system. Call bell, personal items and telephone within reach. Instruct patient to call for assistance. Room bathroom lighting operational. Non-slip footwear when patient is off stretcher. Physically safe environment: no spills, clutter or unnecessary equipment. Stretcher in lowest position, wheels locked, appropriate side rails in place.

## 2020-10-12 ENCOUNTER — APPOINTMENT (OUTPATIENT)
Dept: CARDIOLOGY | Facility: CLINIC | Age: 35
End: 2020-10-12

## 2020-10-12 ENCOUNTER — APPOINTMENT (OUTPATIENT)
Dept: CARDIOLOGY | Facility: CLINIC | Age: 35
End: 2020-10-12
Payer: COMMERCIAL

## 2020-10-12 PROCEDURE — 93308 TTE F-UP OR LMTD: CPT

## 2020-10-15 ENCOUNTER — APPOINTMENT (OUTPATIENT)
Dept: CARDIOLOGY | Facility: CLINIC | Age: 35
End: 2020-10-15
Payer: COMMERCIAL

## 2020-10-15 VITALS
TEMPERATURE: 97.3 F | BODY MASS INDEX: 40.37 KG/M2 | DIASTOLIC BLOOD PRESSURE: 80 MMHG | HEIGHT: 70 IN | HEART RATE: 91 BPM | WEIGHT: 282 LBS | SYSTOLIC BLOOD PRESSURE: 130 MMHG | OXYGEN SATURATION: 96 %

## 2020-10-15 PROBLEM — I25.10 ATHEROSCLEROTIC HEART DISEASE OF NATIVE CORONARY ARTERY WITHOUT ANGINA PECTORIS: Chronic | Status: ACTIVE | Noted: 2020-10-08

## 2020-10-15 PROCEDURE — 99215 OFFICE O/P EST HI 40 MIN: CPT

## 2020-10-15 NOTE — DISCUSSION/SUMMARY
[FreeTextEntry1] : 1) Remain on all current meds.\par 2)Started Indocin 50mg.\par 3) Serum uric acid\par 4)F/u next week to reassess

## 2020-10-15 NOTE — PHYSICAL EXAM
[General Appearance - Well Developed] : well developed [Normal Appearance] : normal appearance [General Appearance - Well Nourished] : well nourished [Well Groomed] : well groomed [No Deformities] : no deformities [General Appearance - In No Acute Distress] : no acute distress [Normal Conjunctiva] : the conjunctiva exhibited no abnormalities [Eyelids - No Xanthelasma] : the eyelids demonstrated no xanthelasmas [No Oral Pallor] : no oral pallor [Normal Oral Mucosa] : normal oral mucosa [No Oral Cyanosis] : no oral cyanosis [Normal Jugular Venous A Waves Present] : normal jugular venous A waves present [No Jugular Venous De Santiago A Waves] : no jugular venous de santiago A waves [Normal Jugular Venous V Waves Present] : normal jugular venous V waves present [Exaggerated Use Of Accessory Muscles For Inspiration] : no accessory muscle use [Respiration, Rhythm And Depth] : normal respiratory rhythm and effort [Auscultation Breath Sounds / Voice Sounds] : lungs were clear to auscultation bilaterally [Heart Rate And Rhythm] : heart rate and rhythm were normal [Heart Sounds] : normal S1 and S2 [Murmurs] : no murmurs present [Abdomen Soft] : soft [Abdomen Tenderness] : non-tender [Abdomen Mass (___ Cm)] : no abdominal mass palpated [Abnormal Walk] : normal gait [Gait - Sufficient For Exercise Testing] : the gait was sufficient for exercise testing [Nail Clubbing] : no clubbing of the fingernails [Cyanosis, Localized] : no localized cyanosis [Petechial Hemorrhages (___cm)] : no petechial hemorrhages [Skin Color & Pigmentation] : normal skin color and pigmentation [] : no rash [No Venous Stasis] : no venous stasis [Skin Lesions] : no skin lesions [No Skin Ulcers] : no skin ulcer [No Xanthoma] : no  xanthoma was observed [Oriented To Time, Place, And Person] : oriented to person, place, and time [Affect] : the affect was normal [Mood] : the mood was normal [No Anxiety] : not feeling anxious

## 2020-10-15 NOTE — REASON FOR VISIT
[Follow-Up - Clinic] : a clinic follow-up of [Cardiomyopathy] : cardiomyopathy [Coronary Artery Disease] : coronary artery disease [Hyperlipidemia] : hyperlipidemia [Hypertension] : hypertension [FreeTextEntry1] : I saw this obese 35-year-old man in follow-up consultation on 10/15/20\par 6 weeks ago he presented to the hospital with chest pain and congestive heart failure, was diuresed and underwent cardiac cath revealing triple-vessel disease, got a balloon pump, was transferred to [facility], and had a quadruple bypass procedure.\par He seems to have recovered well, with some tingling of his 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

## 2020-10-22 ENCOUNTER — APPOINTMENT (OUTPATIENT)
Dept: CARDIOLOGY | Facility: CLINIC | Age: 35
End: 2020-10-22
Payer: COMMERCIAL

## 2020-10-22 VITALS
TEMPERATURE: 97.5 F | SYSTOLIC BLOOD PRESSURE: 124 MMHG | OXYGEN SATURATION: 98 % | HEART RATE: 93 BPM | HEIGHT: 70 IN | WEIGHT: 289 LBS | DIASTOLIC BLOOD PRESSURE: 84 MMHG | BODY MASS INDEX: 41.37 KG/M2

## 2020-10-22 PROCEDURE — 99215 OFFICE O/P EST HI 40 MIN: CPT

## 2020-10-22 NOTE — DISCUSSION/SUMMARY
[FreeTextEntry1] : 1) Remain on all current meds.\par 2)Started Indocin 50mg. increase to BID\par 3) Serum uric acid was > 9.0\par 4)F/u in 3 weeks\par 5) For the gout: reduced diuretic and potassium to alt. day\par     Increased the Indocin to BID\par     Vit. C 4000mg QD\par     Uloric 40mg QD

## 2020-10-22 NOTE — PHYSICAL EXAM
[General Appearance - Well Developed] : well developed [Normal Appearance] : normal appearance [Well Groomed] : well groomed [General Appearance - Well Nourished] : well nourished [No Deformities] : no deformities [General Appearance - In No Acute Distress] : no acute distress [Normal Conjunctiva] : the conjunctiva exhibited no abnormalities [Eyelids - No Xanthelasma] : the eyelids demonstrated no xanthelasmas [Normal Oral Mucosa] : normal oral mucosa [No Oral Pallor] : no oral pallor [No Oral Cyanosis] : no oral cyanosis [Normal Jugular Venous A Waves Present] : normal jugular venous A waves present [Normal Jugular Venous V Waves Present] : normal jugular venous V waves present [No Jugular Venous De Santiago A Waves] : no jugular venous de santiago A waves [Respiration, Rhythm And Depth] : normal respiratory rhythm and effort [Exaggerated Use Of Accessory Muscles For Inspiration] : no accessory muscle use [Auscultation Breath Sounds / Voice Sounds] : lungs were clear to auscultation bilaterally [Heart Rate And Rhythm] : heart rate and rhythm were normal [Heart Sounds] : normal S1 and S2 [Murmurs] : no murmurs present [Abdomen Soft] : soft [Abdomen Tenderness] : non-tender [Abdomen Mass (___ Cm)] : no abdominal mass palpated [Abnormal Walk] : normal gait [Gait - Sufficient For Exercise Testing] : the gait was sufficient for exercise testing [Nail Clubbing] : no clubbing of the fingernails [Cyanosis, Localized] : no localized cyanosis [Petechial Hemorrhages (___cm)] : no petechial hemorrhages [Skin Color & Pigmentation] : normal skin color and pigmentation [] : no rash [No Venous Stasis] : no venous stasis [Skin Lesions] : no skin lesions [No Skin Ulcers] : no skin ulcer [No Xanthoma] : no  xanthoma was observed [Oriented To Time, Place, And Person] : oriented to person, place, and time [Affect] : the affect was normal [Mood] : the mood was normal [No Anxiety] : not feeling anxious

## 2020-10-22 NOTE — REASON FOR VISIT
[Follow-Up - Clinic] : a clinic follow-up of [Cardiomyopathy] : cardiomyopathy [Coronary Artery Disease] : coronary artery disease [Hyperlipidemia] : hyperlipidemia [Hypertension] : hypertension [FreeTextEntry1] : I saw this obese 35-year-old man in follow-up consultation on 10/22/20\par 6 weeks ago he presented to the hospital with chest pain and congestive heart failure, was diuresed and underwent cardiac cath revealing triple-vessel disease, got a balloon pump, was transferred to [facility], and had a quadruple bypass procedure.\par He seems to have recovered well, with some tingling of his fingers from a ulnar nerve injury, has a bedsore on his back, and has discomfort in his left calf from where the vein was removed.\par He comes in today for routine follow-up and complains of constant excruciating pain of his left ankle and foot which seems warm to the touch and may be gout. Blood test showed a uric acid of > 9.0\par Recent echo shows very good improvement of LV function to 55%\par \par

## 2020-11-04 ENCOUNTER — NON-APPOINTMENT (OUTPATIENT)
Age: 35
End: 2020-11-04

## 2020-11-12 ENCOUNTER — APPOINTMENT (OUTPATIENT)
Dept: CARDIOLOGY | Facility: CLINIC | Age: 35
End: 2020-11-12
Payer: COMMERCIAL

## 2020-11-12 VITALS
HEART RATE: 86 BPM | HEIGHT: 70 IN | TEMPERATURE: 97.5 F | WEIGHT: 289 LBS | BODY MASS INDEX: 41.37 KG/M2 | OXYGEN SATURATION: 98 %

## 2020-11-12 VITALS — DIASTOLIC BLOOD PRESSURE: 90 MMHG | SYSTOLIC BLOOD PRESSURE: 126 MMHG

## 2020-11-12 PROCEDURE — 99072 ADDL SUPL MATRL&STAF TM PHE: CPT

## 2020-11-12 PROCEDURE — 99215 OFFICE O/P EST HI 40 MIN: CPT

## 2020-11-12 RX ORDER — INDOMETHACIN 50 MG/1
50 CAPSULE ORAL 3 TIMES DAILY
Qty: 60 | Refills: 3 | Status: DISCONTINUED | COMMUNITY
Start: 2020-10-15 | End: 2020-11-12

## 2020-11-12 RX ORDER — ACETAMINOPHEN 500 MG/1
500 TABLET ORAL EVERY 8 HOURS
Refills: 0 | Status: DISCONTINUED | COMMUNITY
End: 2020-11-12

## 2020-11-12 RX ORDER — TORSEMIDE 20 MG/1
20 TABLET ORAL DAILY
Qty: 90 | Refills: 1 | Status: DISCONTINUED | COMMUNITY
Start: 2020-09-15 | End: 2020-11-12

## 2020-11-12 NOTE — REASON FOR VISIT
[Follow-Up - Clinic] : a clinic follow-up of [Cardiomyopathy] : cardiomyopathy [Coronary Artery Disease] : coronary artery disease [Hyperlipidemia] : hyperlipidemia [Hypertension] : hypertension [FreeTextEntry1] : I saw this obese 35-year-old man in follow-up consultation on 11/12/20\par 9 weeks ago he presented to the hospital with chest pain and congestive heart failure, was diuresed and underwent cardiac cath revealing triple-vessel disease, got a balloon pump, was transferred to [facility] [place], and had a quadruple bypass procedure.\par He seems to have recovered well, with some tingling of his fingers from a ulnar nerve injury, has a bedsore on his back, and has discomfort in his left calf from where the vein was removed.\par He comes in today for routine follow-up and complains of constant excruciating pain of his left ankle and foot which seems warm to the touch and may be gout. Blood test showed a uric acid of > 9.0\par Recent echo shows very good improvement of LV function to 55%\par Despite appropriate got treatment with uric acid lowering and anti-inflammatories, he has no relief of the pain in his left ankle.\par \par

## 2020-11-12 NOTE — DISCUSSION/SUMMARY
[FreeTextEntry1] : 1) Remain on all current meds.\par 2)Stopped Indocin 50mg.\par 3) Serum uric acid was > 9.0\par 4)F/u in 4 months\par 5) For the gout:stopped diuretic and potassium \par stopped  the Indocin \par     Vit. C 4000mg QD\par     Uloric 40mg QD

## 2020-11-17 ENCOUNTER — APPOINTMENT (OUTPATIENT)
Dept: MRI IMAGING | Facility: CLINIC | Age: 35
End: 2020-11-17
Payer: COMMERCIAL

## 2020-11-17 ENCOUNTER — RX CHANGE (OUTPATIENT)
Age: 35
End: 2020-11-17

## 2020-11-17 PROCEDURE — 73718 MRI LOWER EXTREMITY W/O DYE: CPT | Mod: LT

## 2021-01-09 ENCOUNTER — OUTPATIENT (OUTPATIENT)
Dept: OUTPATIENT SERVICES | Facility: HOSPITAL | Age: 36
LOS: 1 days | End: 2021-01-09

## 2021-01-09 DIAGNOSIS — Z95.1 PRESENCE OF AORTOCORONARY BYPASS GRAFT: Chronic | ICD-10-CM

## 2021-01-09 DIAGNOSIS — K46.9 UNSPECIFIED ABDOMINAL HERNIA WITHOUT OBSTRUCTION OR GANGRENE: Chronic | ICD-10-CM

## 2021-02-12 ENCOUNTER — NON-APPOINTMENT (OUTPATIENT)
Age: 36
End: 2021-02-12

## 2021-03-09 ENCOUNTER — NON-APPOINTMENT (OUTPATIENT)
Age: 36
End: 2021-03-09

## 2021-03-09 ENCOUNTER — APPOINTMENT (OUTPATIENT)
Dept: CARDIOLOGY | Facility: CLINIC | Age: 36
End: 2021-03-09
Payer: COMMERCIAL

## 2021-03-09 VITALS
WEIGHT: 315 LBS | HEART RATE: 93 BPM | TEMPERATURE: 98 F | DIASTOLIC BLOOD PRESSURE: 90 MMHG | BODY MASS INDEX: 45.1 KG/M2 | SYSTOLIC BLOOD PRESSURE: 146 MMHG | HEIGHT: 70 IN | OXYGEN SATURATION: 97 %

## 2021-03-09 PROCEDURE — 93000 ELECTROCARDIOGRAM COMPLETE: CPT

## 2021-03-09 PROCEDURE — 99072 ADDL SUPL MATRL&STAF TM PHE: CPT

## 2021-03-09 PROCEDURE — 99215 OFFICE O/P EST HI 40 MIN: CPT

## 2021-03-09 NOTE — DISCUSSION/SUMMARY
[Procedure Low Risk] : the procedure risk is low [Patient Low Risk] : the patient is a low surgical risk [Optimized for Surgery] : the patient is optimized for surgery [As per surgery] : as per surgery [Continue] : Continue medications as currently directed [FreeTextEntry1] : He has gained another 30 pounds since the last time I saw him and his blood pressure is not well controlled.\par I increased his losartan to 50 mg daily for his blood pressure\par I strongly recommend that he undergo bariatric surgery, because he has already had coronary artery bypass surgery and he is only 36 years old.\par He is cleared to undergo bariatric surgery.  He can hold the Plavix for 10 days prior to surgery but  must remain on aspirin 81 mg

## 2021-03-09 NOTE — REASON FOR VISIT
[Follow-Up - Clinic] : a clinic follow-up of [Cardiomyopathy] : cardiomyopathy [Coronary Artery Disease] : coronary artery disease [Hyperlipidemia] : hyperlipidemia [Hypertension] : hypertension [FreeTextEntry1] : I saw this obese 35-year-old man in follow-up consultation on 11/12/20\par 9 weeks ago he presented to the hospital with chest pain and congestive heart failure, was diuresed and underwent cardiac cath revealing triple-vessel disease, got a balloon pump, was transferred to [facility], and had a quadruple bypass procedure.\par He seems to have recovered well, with some tingling of his fingers from a ulnar nerve injury, has a bedsore on his back, and has discomfort in his left calf from where the vein was removed.\par He comes in today for routine follow-up and complains of constant excruciating pain of his left ankle and foot which seems warm to the touch and may be gout. Blood test showed a uric acid of > 9.0\par Recent echo shows very good improvement of LV function to 55%\par Despite appropriate got treatment with uric acid lowering and anti-inflammatories, he has no relief of the pain in his left ankle.\par \par

## 2021-03-09 NOTE — HISTORY OF PRESENT ILLNESS
[Preoperative Visit] : for a medical evaluation prior to surgery [Scheduled Procedure ___] : a [unfilled] [Good] : Good [Poor Exercise Tolerance] : poor exercise tolerance [Cardiovascular Disease] : cardiovascular disease [Anti-Platelet Agents] : anti-platelet agents [Frequent Aspirin Use] : frequent aspirin use [Prior Anesthesia] : Prior anesthesia [Electrocardiogram] : ~T an ECG ~C was performed [Echocardiogram] : ~T an echocardiogram ~C was performed [Cardiac Catheterization  (Diagnostic)] : cardiac catheterization ~T ~C was performed [Fever] : no fever [Chills] : no chills [Fatigue] : no fatigue [Chest Pain] : no chest pain [Cough] : no cough [Dyspnea] : no dyspnea [Dysuria] : no dysuria [Urinary Frequency] : no urinary frequency [Nausea] : no nausea [Vomiting] : no vomiting [Diarrhea] : no diarrhea [Abdominal Pain] : no abdominal pain [Easy Bruising] : no easy bruising [Lower Extremity Swelling] : no lower extremity swelling [Diabetes] : no diabetes [Pulmonary Disease] : no pulmonary disease [Nicotine Dependence] : no nicotine dependence [Alcohol Use] : no  alcohol use [Renal Disease] : no renal disease [GI Disease] : no gastrointestinal disease [Sleep Apnea] : no sleep apnea [Thromboembolic Problems] : no thromboembolic problems [Frequent use of NSAIDs] : no use of NSAIDs [Transfusion Reaction] : no transfusion reaction [Impaired Immunity] : no impaired immunity [Steroid Use in Last 6 Months] : no steroid use in the last six months [Prev Anesthesia Reaction] : no previous anesthesia reaction [Anesthesia Reaction] : no anesthesia reaction [Sudden Death] : no sudden death [Clotting Disorder] : no clotting disorder [Bleeding Disorder] : no bleeding disorder [FreeTextEntry1] : he has no chest pain\par He has no shortness of breath\par He has no palpitations\par He has no syncope\par He is neurologically intact\par He has no edema\par He has no GI symptoms\par

## 2021-04-01 ENCOUNTER — EMERGENCY (EMERGENCY)
Facility: HOSPITAL | Age: 36
LOS: 1 days | End: 2021-04-01
Admitting: EMERGENCY MEDICINE
Payer: COMMERCIAL

## 2021-04-01 DIAGNOSIS — Z95.1 PRESENCE OF AORTOCORONARY BYPASS GRAFT: Chronic | ICD-10-CM

## 2021-04-01 DIAGNOSIS — K46.9 UNSPECIFIED ABDOMINAL HERNIA WITHOUT OBSTRUCTION OR GANGRENE: Chronic | ICD-10-CM

## 2021-04-01 PROCEDURE — 71045 X-RAY EXAM CHEST 1 VIEW: CPT | Mod: 26

## 2021-04-01 PROCEDURE — 93010 ELECTROCARDIOGRAM REPORT: CPT

## 2021-04-01 PROCEDURE — 99285 EMERGENCY DEPT VISIT HI MDM: CPT

## 2021-04-19 ENCOUNTER — RX RENEWAL (OUTPATIENT)
Age: 36
End: 2021-04-19

## 2021-05-04 ENCOUNTER — APPOINTMENT (OUTPATIENT)
Dept: PULMONOLOGY | Facility: CLINIC | Age: 36
End: 2021-05-04
Payer: COMMERCIAL

## 2021-05-04 VITALS — HEART RATE: 98 BPM | DIASTOLIC BLOOD PRESSURE: 82 MMHG | SYSTOLIC BLOOD PRESSURE: 158 MMHG | OXYGEN SATURATION: 97 %

## 2021-05-04 VITALS — BODY MASS INDEX: 42.66 KG/M2 | HEIGHT: 72 IN | WEIGHT: 315 LBS

## 2021-05-04 PROCEDURE — 99204 OFFICE O/P NEW MOD 45 MIN: CPT

## 2021-05-04 PROCEDURE — 99072 ADDL SUPL MATRL&STAF TM PHE: CPT

## 2021-05-04 NOTE — CONSULT LETTER
[Dear  ___] : Dear  [unfilled], [Consult Letter:] : I had the pleasure of evaluating your patient, [unfilled]. [Please see my note below.] : Please see my note below. [Consult Closing:] : Thank you very much for allowing me to participate in the care of this patient.  If you have any questions, please do not hesitate to contact me. [Sincerely,] : Sincerely, [DrKate  ___] : Dr. PARKER

## 2021-05-04 NOTE — HISTORY OF PRESENT ILLNESS
[Obstructive Sleep Apnea] : obstructive sleep apnea [Daytime Somnolence] : daytime somnolence [Nonrestorative Sleep] : nonrestorative sleep [Recent  Weight Gain] : recent weight gain [Snoring] : snoring [Tired while Driving] : tired while driving [Unintentional Sleep while Inactive] : unintentional sleep while inactive [Witnessed Apneas] : witnessed apneas [TextBox_4] : The patient is being seen for a medical evaluation prior to surgery. The procedure scheduled is a Bariatric surgery. Recent health:  Good and poor exercise tolerance, but no fever, no chills, no fatigue, no chest pain, no cough, no dyspnea, no dysuria, no urinary frequency, no nausea, no vomiting, no diarrhea, no abdominal pain, no easy bruising and no lower extremity swelling. Pertinent Medical History: cardiovascular disease, anti-platelet agents, frequent aspirin use and Prior anesthesia, but no diabetes, no pulmonary disease, no nicotine dependence, no alcohol use, no renal disease, no gastrointestinal disease, no sleep apnea, no thromboembolic problems, no clotting disorder, no use of NSAIDs, no bleeding disorder, no transfusion reaction, no impaired immunity, no steroid use in the last six months and no previous anesthesia reaction. \par he has no chest pain\par He has no shortness of breath\par He has no palpitations\par He has no syncope\par He is neurologically intact\par He has no edema\par He has no GI symptoms\par  [ESS] : 13

## 2021-05-04 NOTE — PHYSICAL EXAM
[No Acute Distress] : no acute distress [Elongated Uvula] : elongated uvula [Enlarged Base of the Tongue] : enlarged base of the tongue [III] : Mallampati Class: III [Normal Appearance] : normal appearance [Neck Circumference: ___] : neck circumference: [unfilled] [No Neck Mass] : no neck mass [Normal Rate/Rhythm] : normal rate/rhythm [Normal S1, S2] : normal s1, s2 [No Resp Distress] : no resp distress [Clear to Auscultation Bilaterally] : clear to auscultation bilaterally [No Abnormalities] : no abnormalities [Benign] : benign [Normal Gait] : normal gait [No Clubbing] : no clubbing [No Cyanosis] : no cyanosis [No Edema] : no edema [Normal Color/ Pigmentation] : normal color/ pigmentation [No Focal Deficits] : no focal deficits [Oriented x3] : oriented x3 [Normal Affect] : normal affect [TextBox_2] : obese

## 2021-06-22 ENCOUNTER — APPOINTMENT (OUTPATIENT)
Dept: CARDIOLOGY | Facility: CLINIC | Age: 36
End: 2021-06-22

## 2021-07-13 ENCOUNTER — APPOINTMENT (OUTPATIENT)
Dept: PULMONOLOGY | Facility: CLINIC | Age: 36
End: 2021-07-13
Payer: COMMERCIAL

## 2021-07-13 VITALS
BODY MASS INDEX: 42.66 KG/M2 | HEIGHT: 72 IN | DIASTOLIC BLOOD PRESSURE: 88 MMHG | SYSTOLIC BLOOD PRESSURE: 134 MMHG | OXYGEN SATURATION: 97 % | HEART RATE: 90 BPM | RESPIRATION RATE: 16 BRPM | WEIGHT: 315 LBS

## 2021-07-13 PROCEDURE — 99213 OFFICE O/P EST LOW 20 MIN: CPT

## 2021-07-13 PROCEDURE — 99072 ADDL SUPL MATRL&STAF TM PHE: CPT

## 2021-08-05 ENCOUNTER — APPOINTMENT (OUTPATIENT)
Dept: PULMONOLOGY | Facility: CLINIC | Age: 36
End: 2021-08-05

## 2021-09-09 ENCOUNTER — APPOINTMENT (OUTPATIENT)
Dept: CARDIOLOGY | Facility: CLINIC | Age: 36
End: 2021-09-09

## 2021-09-19 ENCOUNTER — INPATIENT (INPATIENT)
Facility: HOSPITAL | Age: 36
LOS: 0 days | Discharge: HOME | End: 2021-09-20
Attending: FAMILY MEDICINE
Payer: SELF-PAY

## 2021-09-19 DIAGNOSIS — Z95.1 PRESENCE OF AORTOCORONARY BYPASS GRAFT: Chronic | ICD-10-CM

## 2021-09-19 DIAGNOSIS — K46.9 UNSPECIFIED ABDOMINAL HERNIA WITHOUT OBSTRUCTION OR GANGRENE: Chronic | ICD-10-CM

## 2021-09-19 PROCEDURE — 93010 ELECTROCARDIOGRAM REPORT: CPT

## 2021-09-19 PROCEDURE — 71045 X-RAY EXAM CHEST 1 VIEW: CPT | Mod: 26

## 2021-09-19 PROCEDURE — 99285 EMERGENCY DEPT VISIT HI MDM: CPT

## 2021-09-20 PROCEDURE — 99223 1ST HOSP IP/OBS HIGH 75: CPT

## 2021-09-29 ENCOUNTER — APPOINTMENT (OUTPATIENT)
Dept: CARDIOLOGY | Facility: CLINIC | Age: 36
End: 2021-09-29

## 2021-10-11 ENCOUNTER — RX RENEWAL (OUTPATIENT)
Age: 36
End: 2021-10-11

## 2021-11-08 ENCOUNTER — NON-APPOINTMENT (OUTPATIENT)
Age: 36
End: 2021-11-08

## 2021-11-16 ENCOUNTER — APPOINTMENT (OUTPATIENT)
Dept: CARDIOLOGY | Facility: CLINIC | Age: 36
End: 2021-11-16

## 2021-11-21 ENCOUNTER — EMERGENCY (EMERGENCY)
Facility: HOSPITAL | Age: 36
LOS: 1 days | End: 2021-11-21
Admitting: EMERGENCY MEDICINE
Payer: COMMERCIAL

## 2021-11-21 DIAGNOSIS — Z95.1 PRESENCE OF AORTOCORONARY BYPASS GRAFT: Chronic | ICD-10-CM

## 2021-11-21 DIAGNOSIS — K46.9 UNSPECIFIED ABDOMINAL HERNIA WITHOUT OBSTRUCTION OR GANGRENE: Chronic | ICD-10-CM

## 2021-11-21 PROCEDURE — 99053 MED SERV 10PM-8AM 24 HR FAC: CPT

## 2021-11-21 PROCEDURE — 99282 EMERGENCY DEPT VISIT SF MDM: CPT

## 2021-11-23 ENCOUNTER — RX CHANGE (OUTPATIENT)
Age: 36
End: 2021-11-23

## 2021-12-13 ENCOUNTER — OUTPATIENT (OUTPATIENT)
Dept: OUTPATIENT SERVICES | Facility: HOSPITAL | Age: 36
LOS: 1 days | End: 2021-12-13

## 2021-12-13 DIAGNOSIS — Z95.1 PRESENCE OF AORTOCORONARY BYPASS GRAFT: Chronic | ICD-10-CM

## 2021-12-13 DIAGNOSIS — K46.9 UNSPECIFIED ABDOMINAL HERNIA WITHOUT OBSTRUCTION OR GANGRENE: Chronic | ICD-10-CM

## 2021-12-21 ENCOUNTER — APPOINTMENT (OUTPATIENT)
Dept: CARDIOLOGY | Facility: CLINIC | Age: 36
End: 2021-12-21

## 2022-01-09 ENCOUNTER — EMERGENCY (EMERGENCY)
Facility: HOSPITAL | Age: 37
LOS: 1 days | End: 2022-01-09
Admitting: EMERGENCY MEDICINE
Payer: MEDICAID

## 2022-01-09 DIAGNOSIS — K46.9 UNSPECIFIED ABDOMINAL HERNIA WITHOUT OBSTRUCTION OR GANGRENE: Chronic | ICD-10-CM

## 2022-01-09 DIAGNOSIS — Z95.1 PRESENCE OF AORTOCORONARY BYPASS GRAFT: Chronic | ICD-10-CM

## 2022-01-09 PROCEDURE — 99284 EMERGENCY DEPT VISIT MOD MDM: CPT

## 2022-01-09 PROCEDURE — 71045 X-RAY EXAM CHEST 1 VIEW: CPT | Mod: 26

## 2022-01-09 PROCEDURE — 93010 ELECTROCARDIOGRAM REPORT: CPT

## 2022-01-27 ENCOUNTER — APPOINTMENT (OUTPATIENT)
Dept: CARDIOLOGY | Facility: CLINIC | Age: 37
End: 2022-01-27
Payer: COMMERCIAL

## 2022-01-27 VITALS
BODY MASS INDEX: 42.66 KG/M2 | SYSTOLIC BLOOD PRESSURE: 126 MMHG | HEIGHT: 72 IN | HEART RATE: 83 BPM | TEMPERATURE: 97.3 F | WEIGHT: 315 LBS | DIASTOLIC BLOOD PRESSURE: 90 MMHG | OXYGEN SATURATION: 97 %

## 2022-01-27 DIAGNOSIS — U07.1 COVID-19: ICD-10-CM

## 2022-01-27 DIAGNOSIS — R07.89 OTHER CHEST PAIN: ICD-10-CM

## 2022-01-27 DIAGNOSIS — R50.9 FEVER, UNSPECIFIED: ICD-10-CM

## 2022-01-27 DIAGNOSIS — I10 ESSENTIAL (PRIMARY) HYPERTENSION: ICD-10-CM

## 2022-01-27 PROCEDURE — 99215 OFFICE O/P EST HI 40 MIN: CPT

## 2022-01-27 RX ORDER — FEBUXOSTAT 40 MG/1
40 TABLET ORAL DAILY
Qty: 90 | Refills: 3 | Status: DISCONTINUED | COMMUNITY
Start: 2020-10-22 | End: 2022-01-27

## 2022-01-27 NOTE — HISTORY OF PRESENT ILLNESS
[Preoperative Visit] : for a medical evaluation prior to surgery [Scheduled Procedure ___] : a [unfilled] [Surgeon Name ___] : surgeon: [unfilled] [Good] : Good [Poor Exercise Tolerance] : poor exercise tolerance [Cardiovascular Disease] : cardiovascular disease [Anti-Platelet Agents] : anti-platelet agents [Frequent Aspirin Use] : frequent aspirin use [Prior Anesthesia] : Prior anesthesia [Electrocardiogram] : ~T an ECG ~C was performed [Echocardiogram] : ~T an echocardiogram ~C was performed [Cardiac Catheterization  (Diagnostic)] : cardiac catheterization ~T ~C was performed [FreeTextEntry1] : he has no chest pain\par He has no shortness of breath\par He has no palpitations\par He has no syncope\par He is neurologically intact\par He has no edema\par He has no GI symptoms\par  [Fever] : no fever [Chills] : no chills [Fatigue] : no fatigue [Chest Pain] : no chest pain [Cough] : no cough [Dyspnea] : no dyspnea [Dysuria] : no dysuria [Urinary Frequency] : no urinary frequency [Nausea] : no nausea [Vomiting] : no vomiting [Diarrhea] : no diarrhea [Abdominal Pain] : no abdominal pain [Easy Bruising] : no easy bruising [Lower Extremity Swelling] : no lower extremity swelling [Diabetes] : no diabetes [Pulmonary Disease] : no pulmonary disease [Nicotine Dependence] : no nicotine dependence [Alcohol Use] : no  alcohol use [Renal Disease] : no renal disease [GI Disease] : no gastrointestinal disease [Sleep Apnea] : no sleep apnea [Thromboembolic Problems] : no thromboembolic problems [Frequent use of NSAIDs] : no use of NSAIDs [Transfusion Reaction] : no transfusion reaction [Impaired Immunity] : no impaired immunity [Steroid Use in Last 6 Months] : no steroid use in the last six months [Prev Anesthesia Reaction] : no previous anesthesia reaction [Anesthesia Reaction] : no anesthesia reaction [Sudden Death] : no sudden death [Clotting Disorder] : no clotting disorder [Bleeding Disorder] : no bleeding disorder

## 2022-01-27 NOTE — REASON FOR VISIT
[Follow-Up - Clinic] : a clinic follow-up of [Cardiomyopathy] : cardiomyopathy [Coronary Artery Disease] : coronary artery disease [Hyperlipidemia] : hyperlipidemia [Hypertension] : hypertension [FreeTextEntry1] : I saw this obese 36-year-old man in follow-up consultation on 01/27/22\par 08/20  he presented to the hospital with chest pain and congestive heart failure, was diuresed and underwent cardiac cath revealing triple-vessel disease, got a balloon pump, was transferred to [facility], and had a quadruple bypass procedure.\par He seems to have recovered well, with some tingling of his fingers from a ulnar nerve injury, has a bedsore on his back, and has discomfort in his left calf from where the vein was removed.\par He comes in today for routine follow-up and complains of constant excruciating pain of his left ankle and foot which seems warm to the touch and may be gout. Blood test showed a uric acid of > 9.0\par Recent echo shows very good improvement of LV function to 55%\par He is asymptomatic and comes in for routine follow-up and will schedule bariatric surgery to try and reduce his weight\par \par

## 2022-01-27 NOTE — DISCUSSION/SUMMARY
[Procedure Low Risk] : the procedure risk is low [Patient Low Risk] : the patient is a low surgical risk [Optimized for Surgery] : the patient is optimized for surgery [As per surgery] : as per surgery [Continue] : Continue medications as currently directed [FreeTextEntry1] : He has gained another 30 pounds since the last time I saw him and his blood pressure is  well controlled.\par I increased his losartan to 50 mg daily for his blood pressure\par I strongly recommend that he undergo bariatric surgery, because he has already had coronary artery bypass surgery and he is only 36 years old.\par He is cleared to undergo bariatric surgery.  He can hold the Plavix for 10 days prior to surgery but  must remain on aspirin 81 mg

## 2022-01-27 NOTE — LETTER BODY
[To Whom it May Concern:] : To Whom it May Concern: [FreeTextEntry1] : Donovan's documented weights:\par \par 9/8/20: 290lbs\par 9/15/20: 294lbs\par 9/16/20: 285lbs\par 10/15/20: 282lbs\par 10/22/20: 289lbs\par 11/12/20: 289lbs\par 3/9/21: 318lbs\par 5/4/21: 319lbs\par 7/13/21: 319lbs [FreeTextEntry3] : Dr. Billy Higgins

## 2022-04-04 ENCOUNTER — RESULT REVIEW (OUTPATIENT)
Age: 37
End: 2022-04-04

## 2022-04-29 DIAGNOSIS — Z01.811 ENCOUNTER FOR PREPROCEDURAL RESPIRATORY EXAMINATION: ICD-10-CM

## 2022-05-03 LAB — SARS-COV-2 N GENE NPH QL NAA+PROBE: NOT DETECTED

## 2022-05-04 ENCOUNTER — APPOINTMENT (OUTPATIENT)
Dept: PULMONOLOGY | Facility: CLINIC | Age: 37
End: 2022-05-04
Payer: COMMERCIAL

## 2022-05-04 VITALS — HEIGHT: 70 IN | BODY MASS INDEX: 45.1 KG/M2 | WEIGHT: 315 LBS

## 2022-05-04 VITALS
RESPIRATION RATE: 16 BRPM | DIASTOLIC BLOOD PRESSURE: 82 MMHG | SYSTOLIC BLOOD PRESSURE: 130 MMHG | HEART RATE: 89 BPM | OXYGEN SATURATION: 96 %

## 2022-05-04 PROCEDURE — 99213 OFFICE O/P EST LOW 20 MIN: CPT | Mod: 25

## 2022-05-04 PROCEDURE — 94727 GAS DIL/WSHOT DETER LNG VOL: CPT

## 2022-05-04 PROCEDURE — 94729 DIFFUSING CAPACITY: CPT

## 2022-05-04 PROCEDURE — 85018 HEMOGLOBIN: CPT | Mod: QW

## 2022-05-04 PROCEDURE — 94010 BREATHING CAPACITY TEST: CPT

## 2022-05-15 ENCOUNTER — APPOINTMENT (OUTPATIENT)
Age: 37
End: 2022-05-15
Payer: COMMERCIAL

## 2022-05-15 ENCOUNTER — OUTPATIENT (OUTPATIENT)
Dept: OUTPATIENT SERVICES | Facility: HOSPITAL | Age: 37
LOS: 1 days | End: 2022-05-15
Payer: COMMERCIAL

## 2022-05-15 DIAGNOSIS — G47.33 OBSTRUCTIVE SLEEP APNEA (ADULT) (PEDIATRIC): ICD-10-CM

## 2022-05-15 DIAGNOSIS — Z95.1 PRESENCE OF AORTOCORONARY BYPASS GRAFT: Chronic | ICD-10-CM

## 2022-05-15 DIAGNOSIS — K46.9 UNSPECIFIED ABDOMINAL HERNIA WITHOUT OBSTRUCTION OR GANGRENE: Chronic | ICD-10-CM

## 2022-05-15 PROCEDURE — 95811 POLYSOM 6/>YRS CPAP 4/> PARM: CPT

## 2022-05-15 PROCEDURE — 95811 POLYSOM 6/>YRS CPAP 4/> PARM: CPT | Mod: 26

## 2022-05-24 NOTE — HISTORY OF PRESENT ILLNESS
[Obstructive Sleep Apnea] : obstructive sleep apnea [Daytime Somnolence] : daytime somnolence [Nonrestorative Sleep] : nonrestorative sleep [Recent  Weight Gain] : recent weight gain [Snoring] : snoring [Tired while Driving] : tired while driving [Unintentional Sleep while Inactive] : unintentional sleep while inactive [Witnessed Apneas] : witnessed apneas [TextBox_4] : The patient is being seen for a medical evaluation prior to surgery. The procedure scheduled is a Bariatric surgery. Recent health:  Good and poor exercise tolerance, but no fever, no chills, no fatigue, no chest pain, no cough, no dyspnea, no dysuria, no urinary frequency, no nausea, no vomiting, no diarrhea, no abdominal pain, no easy bruising and no lower extremity swelling. Pertinent Medical History: cardiovascular disease, anti-platelet agents, frequent aspirin use and Prior anesthesia, but no diabetes, no pulmonary disease, no nicotine dependence, no alcohol use, no renal disease, no gastrointestinal disease, no sleep apnea, no thromboembolic problems, no clotting disorder, no use of NSAIDs, no bleeding disorder, no transfusion reaction, no impaired immunity, no steroid use in the last six months and no previous anesthesia reaction. \par he has no chest pain\par He has no shortness of breath\par He has no palpitations\par He has no syncope\par He is neurologically intact\par He has no edema\par He has no GI symptoms\par \par 5/4/22\par Bariatric surgery delayed by insurance issues\par Going for surgery at [facility] soon \par  [ESS] : 13

## 2022-05-25 ENCOUNTER — APPOINTMENT (OUTPATIENT)
Dept: PULMONOLOGY | Facility: CLINIC | Age: 37
End: 2022-05-25
Payer: COMMERCIAL

## 2022-05-25 VITALS
WEIGHT: 218 LBS | HEIGHT: 70 IN | BODY MASS INDEX: 31.21 KG/M2 | OXYGEN SATURATION: 96 % | RESPIRATION RATE: 16 BRPM | SYSTOLIC BLOOD PRESSURE: 130 MMHG | HEART RATE: 87 BPM | DIASTOLIC BLOOD PRESSURE: 82 MMHG

## 2022-05-25 DIAGNOSIS — R06.00 DYSPNEA, UNSPECIFIED: ICD-10-CM

## 2022-05-25 PROCEDURE — 99213 OFFICE O/P EST LOW 20 MIN: CPT

## 2022-05-25 NOTE — HISTORY OF PRESENT ILLNESS
[Obstructive Sleep Apnea] : obstructive sleep apnea [Daytime Somnolence] : daytime somnolence [Nonrestorative Sleep] : nonrestorative sleep [Recent  Weight Gain] : recent weight gain [Snoring] : snoring [Tired while Driving] : tired while driving [Unintentional Sleep while Inactive] : unintentional sleep while inactive [Witnessed Apneas] : witnessed apneas [TextBox_4] : The patient is being seen for a medical evaluation prior to surgery. The procedure scheduled is a Bariatric surgery. Recent health:  Good and poor exercise tolerance, but no fever, no chills, no fatigue, no chest pain, no cough, no dyspnea, no dysuria, no urinary frequency, no nausea, no vomiting, no diarrhea, no abdominal pain, no easy bruising and no lower extremity swelling. Pertinent Medical History: cardiovascular disease, anti-platelet agents, frequent aspirin use and Prior anesthesia, but no diabetes, no pulmonary disease, no nicotine dependence, no alcohol use, no renal disease, no gastrointestinal disease, no sleep apnea, no thromboembolic problems, no clotting disorder, no use of NSAIDs, no bleeding disorder, no transfusion reaction, no impaired immunity, no steroid use in the last six months and no previous anesthesia reaction. \par he has no chest pain\par He has no shortness of breath\par He has no palpitations\par He has no syncope\par He is neurologically intact\par He has no edema\par He has no GI symptoms\par \par 5/4/22\par Bariatric surgery delayed by insurance issues\par Going for surgery at [place] soon \par \par 5/25/22\par Compliant with and benefiting from nocturnal Full Face BiPAP via a large Full Face Mask \par \par  [ESS] : 13

## 2022-05-25 NOTE — DISCUSSION/SUMMARY
[FreeTextEntry1] : Morbid obesity\par TRINH\par Severe ELSA \par Compliant with and benefiting from nocturnal BPAP - using his brother-in-law's machine that I programed \par Adequately treated on CPAP=14 cm H20 - which should be provided for the post operative night at the hospital \par Normal lung function \par No significant pulmonary or sleep medicine related contraindication to bariatric surgery under general anesthesia\par FU planned in 4 months

## 2022-05-31 ENCOUNTER — RX RENEWAL (OUTPATIENT)
Age: 37
End: 2022-05-31

## 2022-06-04 ENCOUNTER — TELEPHONE ENCOUNTER (OUTPATIENT)
Dept: URBAN - METROPOLITAN AREA CLINIC 68 | Facility: CLINIC | Age: 37
End: 2022-06-04

## 2022-06-05 ENCOUNTER — RESULT CHARGE (OUTPATIENT)
Age: 37
End: 2022-06-05

## 2022-06-05 ENCOUNTER — TELEPHONE ENCOUNTER (OUTPATIENT)
Dept: URBAN - METROPOLITAN AREA CLINIC 68 | Facility: CLINIC | Age: 37
End: 2022-06-05

## 2022-06-05 RX ORDER — AMOXICILLIN 500 MG/1
TABLET, FILM COATED ORAL
Status: ACTIVE | COMMUNITY
Start: 2008-01-07

## 2022-06-05 RX ORDER — ESOMEPRAZOLE MAGNESIUM 40 MG
CAPSULE,DELAYED RELEASE (ENTERIC COATED) ORAL
Status: ACTIVE | COMMUNITY
Start: 2008-01-07

## 2022-06-06 ENCOUNTER — NON-APPOINTMENT (OUTPATIENT)
Age: 37
End: 2022-06-06

## 2022-06-06 ENCOUNTER — APPOINTMENT (OUTPATIENT)
Dept: CARDIOLOGY | Facility: CLINIC | Age: 37
End: 2022-06-06
Payer: COMMERCIAL

## 2022-06-06 VITALS
DIASTOLIC BLOOD PRESSURE: 80 MMHG | OXYGEN SATURATION: 95 % | TEMPERATURE: 97.8 F | SYSTOLIC BLOOD PRESSURE: 124 MMHG | BODY MASS INDEX: 45.92 KG/M2 | WEIGHT: 315 LBS | HEART RATE: 92 BPM

## 2022-06-06 PROCEDURE — 93000 ELECTROCARDIOGRAM COMPLETE: CPT

## 2022-06-06 PROCEDURE — 99215 OFFICE O/P EST HI 40 MIN: CPT | Mod: 25

## 2022-06-06 NOTE — DISCUSSION/SUMMARY
[FreeTextEntry1] : JOLENE GUTIÉRREZ is a 37 year old M who presents today Jun 06, 2022 with the above history and the following active issues:\par \par CAD/CHF/ICM/s/p IABP/ s/p quadruple bypass: Now presenting for clearance for bariatric sx. Abnormal EKG. Intermittent edema and tingling of hands. Recommend echo and nuclear stress test. Recommend 24 hour holter monitor. He is unsure if he has a family hx of anuerysm, abd u/s has been ordered.\par \par On ASA, Plavix, Atorvastatin, Losartan, and Metoprolol.\par \par HLD: Statin as above. Fasting labs ordered.\par \par ELSA: On CPAP.\par \par Ongoing f/u with PCP.\par \par F/U after testing to review results (echo, abd u/s, nuke, Zio, labs). Testing to be done and patient to be seen in 1 week.\par Discussed red flag symptoms, which would warrant sooner or emergent medical evaluation.\par Any questions and concerns were addressed and resolved.\par \par Sincerely,\par Jyoti Ortiz [facility]\par Patient's history, testing, and plan was reviewed with supervising physician, Dr. Yared Hensley

## 2022-06-06 NOTE — HISTORY OF PRESENT ILLNESS
[FreeTextEntry1] : JOLENE GUTIÉRREZ is a 37 year old male with a past medical history of:\par \par HLD, ICM, ELSA, Gout\par 08/20 he presented to the hospital with chest pain and congestive heart failure, was diuresed and underwent cardiac cath revealing triple-vessel disease, got a balloon pump, was transferred to [facility], and had a quadruple bypass procedure.\par \par Last seen 1/27/22. In the interim there have been no hospitalizations or procedures. Presents today for cardiac clearance prior to bariatric surgery scheduled 6/21/22 at [facility]. Note edema and BL tingling of hands. Denies CP, SOB, palpitations, dizziness, lightheadedness, syncope, near syncope, and claudication. Former smoker. Not on a formal exercise regimen, but active at work.\par \par Testing:\par \par EKG 6/6/22: SR at 83 bpm, IA interval 132 ms, QTc 424 ms, Q waves in lead III, nonspecific ST-T wave abnormalities \par \par Labs 9/2021: WBC 9.09, Hgb 13.6, HCT 43.5, plt 309, Na 137, K 4.2, Cr 0.7, AST 25, ALT 25, Mag 1.8, Trigs 282, Chol 177, HDL 36, LDL 85, TSH 3.03, Trop <0.010, <0.010, <0.010\par \par Echo 10/12/20: EF 55-60%. Mild concentric LVH. Mild segment LV systolic dysfucntion with hypokinesis of the basal inferior walla nd basal inferoseptum. Septal motion c/w CABG.\par \par Carotids 8/2020: No hemodynamically sig stenosis int he visualized vessels.

## 2022-06-07 ENCOUNTER — APPOINTMENT (OUTPATIENT)
Dept: CARDIOLOGY | Facility: CLINIC | Age: 37
End: 2022-06-07
Payer: COMMERCIAL

## 2022-06-07 ENCOUNTER — NON-APPOINTMENT (OUTPATIENT)
Age: 37
End: 2022-06-07

## 2022-06-07 VITALS
WEIGHT: 315 LBS | TEMPERATURE: 97.6 F | HEIGHT: 70 IN | OXYGEN SATURATION: 96 % | HEART RATE: 91 BPM | BODY MASS INDEX: 45.1 KG/M2 | SYSTOLIC BLOOD PRESSURE: 120 MMHG | DIASTOLIC BLOOD PRESSURE: 84 MMHG

## 2022-06-07 PROCEDURE — 99215 OFFICE O/P EST HI 40 MIN: CPT

## 2022-06-08 NOTE — REASON FOR VISIT
[Follow-Up - Clinic] : a clinic follow-up of [Cardiomyopathy] : cardiomyopathy [Coronary Artery Disease] : coronary artery disease [Hyperlipidemia] : hyperlipidemia [Hypertension] : hypertension [FreeTextEntry1] : I saw this obese 36-year-old man in follow-up consultation on 01/27/22\par 08/20  he presented to the hospital with chest pain and congestive heart failure, was diuresed and underwent cardiac cath revealing triple-vessel disease, got a balloon pump, was transferred to [facility], and had a quadruple bypass procedure.\par He seems to have recovered well, with some tingling of his fingers from a ulnar nerve injury, has a bedsore on his back, and has discomfort in his left calf from where the vein was removed.\par He comes in today for routine follow-up and complains of constant excruciating pain of his left ankle and foot which seems warm to the touch and may be gout. Blood test showed a uric acid of > 9.0\par Recent echo shows very good improvement of LV function to 55%\par He is asymptomatic and comes in for routine follow-up and will schedule bariatric surgery to try and reduce his weight\par \par

## 2022-06-08 NOTE — HISTORY OF PRESENT ILLNESS
[Preoperative Visit] : for a medical evaluation prior to surgery [Scheduled Procedure ___] : a [unfilled] [Date of Surgery ___] : on [unfilled] [Surgeon Name ___] : surgeon: [unfilled] [Good] : Good [Poor Exercise Tolerance] : poor exercise tolerance [Cardiovascular Disease] : cardiovascular disease [Anti-Platelet Agents] : anti-platelet agents [Frequent Aspirin Use] : frequent aspirin use [Prior Anesthesia] : Prior anesthesia [Electrocardiogram] : ~T an ECG ~C was performed [Echocardiogram] : ~T an echocardiogram ~C was performed [Cardiac Catheterization  (Diagnostic)] : cardiac catheterization ~T ~C was performed [Fever] : no fever [Chills] : no chills [Fatigue] : no fatigue [Chest Pain] : no chest pain [Cough] : no cough [Dyspnea] : no dyspnea [Dysuria] : no dysuria [Urinary Frequency] : no urinary frequency [Nausea] : no nausea [Vomiting] : no vomiting [Diarrhea] : no diarrhea [Abdominal Pain] : no abdominal pain [Easy Bruising] : no easy bruising [Lower Extremity Swelling] : no lower extremity swelling [Diabetes] : no diabetes [Pulmonary Disease] : no pulmonary disease [Nicotine Dependence] : no nicotine dependence [Alcohol Use] : no  alcohol use [Renal Disease] : no renal disease [GI Disease] : no gastrointestinal disease [Sleep Apnea] : no sleep apnea [Thromboembolic Problems] : no thromboembolic problems [Frequent use of NSAIDs] : no use of NSAIDs [Transfusion Reaction] : no transfusion reaction [Impaired Immunity] : no impaired immunity [Steroid Use in Last 6 Months] : no steroid use in the last six months [Prev Anesthesia Reaction] : no previous anesthesia reaction [Anesthesia Reaction] : no anesthesia reaction [Sudden Death] : no sudden death [Clotting Disorder] : no clotting disorder [Bleeding Disorder] : no bleeding disorder [FreeTextEntry1] : he has no chest pain\par He has no shortness of breath\par He has no palpitations\par He has no syncope\par He is neurologically intact\par He has no edema\par He has no GI symptoms\par

## 2022-06-08 NOTE — DISCUSSION/SUMMARY
[Procedure Low Risk] : the procedure risk is low [Patient Low Risk] : the patient is a low surgical risk [Optimized for Surgery] : the patient is optimized for surgery [As per surgery] : as per surgery [Continue] : Continue medications as currently directed [FreeTextEntry1] : He has gained another 30 pounds since the last time I saw him and his blood pressure is  well controlled.\par I increased his losartan to 50 mg daily for his blood pressure\par I strongly recommend that he undergo bariatric surgery, because he has already had coronary artery bypass surgery and he is only 36 years old.\par He has cardiac clearance  to undergo bariatric surgery.  He can hold the Plavix for 10 days prior to surgery but  must remain on aspirin 81 mg

## 2022-06-13 ENCOUNTER — APPOINTMENT (OUTPATIENT)
Dept: CARDIOLOGY | Facility: CLINIC | Age: 37
End: 2022-06-13

## 2022-06-14 ENCOUNTER — APPOINTMENT (OUTPATIENT)
Dept: CARDIOLOGY | Facility: CLINIC | Age: 37
End: 2022-06-14

## 2022-06-15 ENCOUNTER — APPOINTMENT (OUTPATIENT)
Dept: CARDIOLOGY | Facility: CLINIC | Age: 37
End: 2022-06-15

## 2022-06-21 ENCOUNTER — RESULT REVIEW (OUTPATIENT)
Age: 37
End: 2022-06-21

## 2022-06-25 ENCOUNTER — TELEPHONE ENCOUNTER (OUTPATIENT)
Age: 37
End: 2022-06-25

## 2022-06-26 ENCOUNTER — TELEPHONE ENCOUNTER (OUTPATIENT)
Age: 37
End: 2022-06-26

## 2022-06-26 RX ORDER — CLARITHROMYCIN 500 MG/1
TABLET, FILM COATED, EXTENDED RELEASE ORAL
Status: ACTIVE | COMMUNITY
Start: 2008-01-07

## 2022-06-26 RX ORDER — LANSOPRAZOLE 30 MG/1
PREVACID(    1 PO QD ) ACTIVE -HX ENTRY TABLET, ORALLY DISINTEGRATING, DELAYED RELEASE ORAL
Status: ACTIVE | COMMUNITY
Start: 2008-01-07

## 2022-06-26 RX ORDER — AMOXICILLIN 500 MG/1
TABLET, FILM COATED ORAL
Status: ACTIVE | COMMUNITY
Start: 2008-01-07

## 2022-06-26 RX ORDER — DICYCLOMINE HYDROCHLORIDE 20 MG/2ML
BENTYL(    AS DIRECTED ) ACTIVE -HX ENTRY INJECTION, SOLUTION INTRAMUSCULAR AS DIRECTED
Status: ACTIVE | COMMUNITY
Start: 2008-01-16

## 2022-06-26 RX ORDER — ESOMEPRAZOLE MAGNESIUM 40 MG
CAPSULE,DELAYED RELEASE (ENTERIC COATED) ORAL
Status: ACTIVE | COMMUNITY
Start: 2008-01-07

## 2022-07-26 ENCOUNTER — NON-APPOINTMENT (OUTPATIENT)
Age: 37
End: 2022-07-26

## 2022-08-25 ENCOUNTER — RX RENEWAL (OUTPATIENT)
Age: 37
End: 2022-08-25

## 2022-09-20 ENCOUNTER — APPOINTMENT (OUTPATIENT)
Dept: PULMONOLOGY | Facility: CLINIC | Age: 37
End: 2022-09-20

## 2022-09-27 NOTE — ED STATDOCS - CHPI ED TIMING
constant Mart-1 - Positive Histology Text: MART-1 staining demonstrates areas of higher density and clustering of melanocytes with Pagetoid spread upwards within the epidermis. The surgical margins are positive for tumor cells.

## 2022-10-27 ENCOUNTER — APPOINTMENT (OUTPATIENT)
Dept: CARDIOLOGY | Facility: CLINIC | Age: 37
End: 2022-10-27

## 2022-10-27 VITALS
WEIGHT: 244 LBS | TEMPERATURE: 97.6 F | BODY MASS INDEX: 35.01 KG/M2 | SYSTOLIC BLOOD PRESSURE: 122 MMHG | HEART RATE: 72 BPM | OXYGEN SATURATION: 98 % | DIASTOLIC BLOOD PRESSURE: 80 MMHG

## 2022-10-27 DIAGNOSIS — Z00.00 ENCOUNTER FOR GENERAL ADULT MEDICAL EXAMINATION W/OUT ABNORMAL FINDINGS: ICD-10-CM

## 2022-10-27 PROCEDURE — 99215 OFFICE O/P EST HI 40 MIN: CPT

## 2022-10-27 RX ORDER — CHLORHEXIDINE GLUCONATE 4 %
325 (65 FE) LIQUID (ML) TOPICAL DAILY
Qty: 90 | Refills: 1 | Status: DISCONTINUED | COMMUNITY
Start: 2021-09-29 | End: 2022-10-27

## 2022-10-27 NOTE — DISCUSSION/SUMMARY
[Procedure Low Risk] : the procedure risk is low [Patient Low Risk] : the patient is a low surgical risk [Optimized for Surgery] : the patient is optimized for surgery [As per surgery] : as per surgery [Continue] : Continue medications as currently directed [FreeTextEntry1] : Since losing the 60 pounds he has had periods of lightheadedness so I decreased his beta-blocker from 100 mg twice daily to once a day \par After he loses another 30 pounds he will come back to get his blood pressure checked and readjust his blood pressure medication.\par Recent lipid profile is excellent

## 2022-10-27 NOTE — REASON FOR VISIT
[Follow-Up - Clinic] : a clinic follow-up of [Cardiomyopathy] : cardiomyopathy [Coronary Artery Disease] : coronary artery disease [Hyperlipidemia] : hyperlipidemia [Hypertension] : hypertension [FreeTextEntry1] : I saw this obese 37-year-old man in follow-up consultation on 10/27/22\par Had bariatric surgery and has lost 60 pounds in weight and will continue to lose weight for at least another 30 pounds.\par 08/20  he presented to the hospital with chest pain and congestive heart failure, was diuresed and underwent cardiac cath revealing triple-vessel disease, got a balloon pump, was transferred to [facility], and had a quadruple bypass procedure.\par He seems to have recovered well, with some tingling of his fingers from a ulnar nerve injury, has a bedsore on his back, and has discomfort in his left calf from where the vein was removed.\par He comes in today for routine follow-up and complains of constant excruciating pain of his left ankle and foot which seems warm to the touch and may be gout. Blood test showed a uric acid of > 9.0\par Recent echo shows very good improvement of LV function to 55%\par He is asymptomatic and comes in for routine follow-up

## 2022-10-27 NOTE — HISTORY OF PRESENT ILLNESS
[Good] : Good [Poor Exercise Tolerance] : poor exercise tolerance [Cardiovascular Disease] : cardiovascular disease [Anti-Platelet Agents] : anti-platelet agents [Frequent Aspirin Use] : frequent aspirin use [Prior Anesthesia] : Prior anesthesia [Electrocardiogram] : ~T an ECG ~C was performed [Echocardiogram] : ~T an echocardiogram ~C was performed [Cardiac Catheterization  (Diagnostic)] : cardiac catheterization ~T ~C was performed [FreeTextEntry1] : he has no chest pain\par He has no shortness of breath\par He has no palpitations\par He has no syncope\par He is neurologically intact\par He has no edema\par He has no GI symptoms\par  [Fever] : no fever [Chills] : no chills [Fatigue] : no fatigue [Chest Pain] : no chest pain [Cough] : no cough [Dyspnea] : no dyspnea [Dysuria] : no dysuria [Urinary Frequency] : no urinary frequency [Nausea] : no nausea [Vomiting] : no vomiting [Diarrhea] : no diarrhea [Abdominal Pain] : no abdominal pain [Easy Bruising] : no easy bruising [Lower Extremity Swelling] : no lower extremity swelling [Diabetes] : no diabetes [Pulmonary Disease] : no pulmonary disease [Nicotine Dependence] : no nicotine dependence [Alcohol Use] : no  alcohol use [Renal Disease] : no renal disease [GI Disease] : no gastrointestinal disease [Sleep Apnea] : no sleep apnea [Thromboembolic Problems] : no thromboembolic problems [Frequent use of NSAIDs] : no use of NSAIDs [Transfusion Reaction] : no transfusion reaction [Impaired Immunity] : no impaired immunity [Steroid Use in Last 6 Months] : no steroid use in the last six months [Prev Anesthesia Reaction] : no previous anesthesia reaction [Anesthesia Reaction] : no anesthesia reaction [Sudden Death] : no sudden death [Clotting Disorder] : no clotting disorder [Bleeding Disorder] : no bleeding disorder

## 2023-06-29 ENCOUNTER — APPOINTMENT (OUTPATIENT)
Dept: CARDIOLOGY | Facility: CLINIC | Age: 38
End: 2023-06-29

## 2023-07-19 ENCOUNTER — NON-APPOINTMENT (OUTPATIENT)
Age: 38
End: 2023-07-19

## 2023-07-19 ENCOUNTER — APPOINTMENT (OUTPATIENT)
Dept: FAMILY MEDICINE | Facility: CLINIC | Age: 38
End: 2023-07-19
Payer: COMMERCIAL

## 2023-07-19 VITALS
BODY MASS INDEX: 33.21 KG/M2 | WEIGHT: 232 LBS | OXYGEN SATURATION: 98 % | TEMPERATURE: 98.7 F | DIASTOLIC BLOOD PRESSURE: 78 MMHG | HEIGHT: 70 IN | HEART RATE: 71 BPM | SYSTOLIC BLOOD PRESSURE: 125 MMHG

## 2023-07-19 DIAGNOSIS — M12.812 OTHER SPECIFIC ARTHROPATHIES, NOT ELSEWHERE CLASSIFIED, LEFT SHOULDER: ICD-10-CM

## 2023-07-19 PROCEDURE — 99204 OFFICE O/P NEW MOD 45 MIN: CPT
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
## 2023-07-19 NOTE — PHYSICAL EXAM
[No Acute Distress] : no acute distress [Well Nourished] : well nourished [Well Developed] : well developed [Well-Appearing] : well-appearing [Normal Sclera/Conjunctiva] : normal sclera/conjunctiva [PERRL] : pupils equal round and reactive to light [EOMI] : extraocular movements intact [Normal Outer Ear/Nose] : the outer ears and nose were normal in appearance [Normal Oropharynx] : the oropharynx was normal [No JVD] : no jugular venous distention [No Lymphadenopathy] : no lymphadenopathy [Supple] : supple [Thyroid Normal, No Nodules] : the thyroid was normal and there were no nodules present [No Respiratory Distress] : no respiratory distress  [No Accessory Muscle Use] : no accessory muscle use [Clear to Auscultation] : lungs were clear to auscultation bilaterally [Normal Rate] : normal rate  [Regular Rhythm] : with a regular rhythm [Normal S1, S2] : normal S1 and S2 [No Murmur] : no murmur heard [No Carotid Bruits] : no carotid bruits [No Abdominal Bruit] : a ~M bruit was not heard ~T in the abdomen [No Varicosities] : no varicosities [No Edema] : there was no peripheral edema [Pedal Pulses Present] : the pedal pulses are present [No Palpable Aorta] : no palpable aorta [No Extremity Clubbing/Cyanosis] : no extremity clubbing/cyanosis [Soft] : abdomen soft [Non Tender] : non-tender [Non-distended] : non-distended [No Masses] : no abdominal mass palpated [No HSM] : no HSM [Normal Bowel Sounds] : normal bowel sounds [Normal Posterior Cervical Nodes] : no posterior cervical lymphadenopathy [Normal Anterior Cervical Nodes] : no anterior cervical lymphadenopathy [No CVA Tenderness] : no CVA  tenderness [No Spinal Tenderness] : no spinal tenderness [No Joint Swelling] : no joint swelling [Grossly Normal Strength/Tone] : grossly normal strength/tone [Coordination Grossly Intact] : coordination grossly intact [No Rash] : no rash [No Focal Deficits] : no focal deficits [Normal Gait] : normal gait [Deep Tendon Reflexes (DTR)] : deep tendon reflexes were 2+ and symmetric [Normal Affect] : the affect was normal [Normal Insight/Judgement] : insight and judgment were intact

## 2023-11-09 ENCOUNTER — APPOINTMENT (OUTPATIENT)
Dept: CARDIOLOGY | Facility: CLINIC | Age: 38
End: 2023-11-09
Payer: COMMERCIAL

## 2023-11-09 ENCOUNTER — NON-APPOINTMENT (OUTPATIENT)
Age: 38
End: 2023-11-09

## 2023-11-09 VITALS — SYSTOLIC BLOOD PRESSURE: 142 MMHG | DIASTOLIC BLOOD PRESSURE: 84 MMHG

## 2023-11-09 VITALS
SYSTOLIC BLOOD PRESSURE: 150 MMHG | HEART RATE: 83 BPM | WEIGHT: 233 LBS | BODY MASS INDEX: 43.99 KG/M2 | HEIGHT: 61 IN | DIASTOLIC BLOOD PRESSURE: 82 MMHG | OXYGEN SATURATION: 100 %

## 2023-11-09 DIAGNOSIS — Z01.818 ENCOUNTER FOR OTHER PREPROCEDURAL EXAMINATION: ICD-10-CM

## 2023-11-09 PROCEDURE — 93000 ELECTROCARDIOGRAM COMPLETE: CPT

## 2023-11-09 PROCEDURE — 99215 OFFICE O/P EST HI 40 MIN: CPT | Mod: 25

## 2023-11-09 PROCEDURE — G0404: CPT

## 2023-11-09 RX ORDER — OMEPRAZOLE 20 MG/1
20 TABLET, DELAYED RELEASE ORAL
Refills: 0 | Status: ACTIVE | COMMUNITY

## 2023-11-09 RX ORDER — FOLIC ACID 1 MG/1
1 TABLET ORAL
Refills: 0 | Status: DISCONTINUED | COMMUNITY
End: 2023-11-09

## 2023-11-09 RX ORDER — POTASSIUM CHLORIDE 1500 MG/1
20 TABLET, EXTENDED RELEASE ORAL DAILY
Qty: 90 | Refills: 3 | Status: DISCONTINUED | COMMUNITY
End: 2023-11-09

## 2023-11-09 RX ORDER — GABAPENTIN 100 MG/1
100 CAPSULE ORAL
Refills: 0 | Status: DISCONTINUED | COMMUNITY
End: 2023-11-09

## 2023-11-09 RX ORDER — PANTOPRAZOLE 40 MG/1
40 TABLET, DELAYED RELEASE ORAL
Refills: 0 | Status: DISCONTINUED | COMMUNITY
End: 2023-11-09

## 2023-11-14 ENCOUNTER — RX RENEWAL (OUTPATIENT)
Age: 38
End: 2023-11-14

## 2024-01-08 ENCOUNTER — RX RENEWAL (OUTPATIENT)
Age: 39
End: 2024-01-08

## 2024-01-24 ENCOUNTER — APPOINTMENT (OUTPATIENT)
Dept: FAMILY MEDICINE | Facility: CLINIC | Age: 39
End: 2024-01-24

## 2024-02-14 ENCOUNTER — APPOINTMENT (OUTPATIENT)
Dept: FAMILY MEDICINE | Facility: CLINIC | Age: 39
End: 2024-02-14
Payer: COMMERCIAL

## 2024-02-14 VITALS
HEIGHT: 61 IN | SYSTOLIC BLOOD PRESSURE: 134 MMHG | HEART RATE: 91 BPM | WEIGHT: 235 LBS | RESPIRATION RATE: 16 BRPM | OXYGEN SATURATION: 98 % | BODY MASS INDEX: 44.37 KG/M2 | DIASTOLIC BLOOD PRESSURE: 80 MMHG | TEMPERATURE: 98.1 F

## 2024-02-14 DIAGNOSIS — G47.33 OBSTRUCTIVE SLEEP APNEA (ADULT) (PEDIATRIC): ICD-10-CM

## 2024-02-14 DIAGNOSIS — K21.9 GASTRO-ESOPHAGEAL REFLUX DISEASE W/OUT ESOPHAGITIS: ICD-10-CM

## 2024-02-14 DIAGNOSIS — E66.9 OBESITY, UNSPECIFIED: ICD-10-CM

## 2024-02-14 DIAGNOSIS — R91.1 SOLITARY PULMONARY NODULE: ICD-10-CM

## 2024-02-14 PROCEDURE — 99214 OFFICE O/P EST MOD 30 MIN: CPT

## 2024-02-14 RX ORDER — LOSARTAN POTASSIUM 100 MG/1
100 TABLET, FILM COATED ORAL DAILY
Qty: 90 | Refills: 0 | Status: ACTIVE | COMMUNITY
Start: 1900-01-01 | End: 1900-01-01

## 2024-02-14 RX ORDER — METOPROLOL SUCCINATE 100 MG/1
100 TABLET, EXTENDED RELEASE ORAL
Qty: 90 | Refills: 1 | Status: ACTIVE | COMMUNITY
Start: 2021-10-11 | End: 1900-01-01

## 2024-02-14 RX ORDER — ATORVASTATIN CALCIUM 80 MG/1
80 TABLET, FILM COATED ORAL
Qty: 90 | Refills: 0 | Status: ACTIVE | COMMUNITY
Start: 2020-09-15 | End: 1900-01-01

## 2024-02-14 NOTE — PLAN
[FreeTextEntry1] : 39-year-old male presents for evaluation Hypertension-history of coronary artery bypass graft Metoprolol/Toprol 100 mg daily/losartan 100 mg daily/atorvastatin 80 mg daily for hyperlipidemia/PAC Plavix 75 mg daily Obstructive sleep apnea-erratic with using his sleep monitoring device

## 2024-02-14 NOTE — HEALTH RISK ASSESSMENT
[0] : 2) Feeling down, depressed, or hopeless: Not at all (0) [PHQ-2 Negative - No further assessment needed] : PHQ-2 Negative - No further assessment needed [[id number]] : 0

## 2024-02-14 NOTE — HISTORY OF PRESENT ILLNESS
[FreeTextEntry1] : Medication reconciliation [de-identified] : Coronary artery disease/CABG Gout/GERD/cardiomyopathy/sleep apnea

## 2024-02-15 ENCOUNTER — APPOINTMENT (OUTPATIENT)
Dept: CARDIOLOGY | Facility: CLINIC | Age: 39
End: 2024-02-15
Payer: COMMERCIAL

## 2024-02-15 ENCOUNTER — NON-APPOINTMENT (OUTPATIENT)
Age: 39
End: 2024-02-15

## 2024-02-15 VITALS
HEIGHT: 71 IN | OXYGEN SATURATION: 98 % | DIASTOLIC BLOOD PRESSURE: 90 MMHG | SYSTOLIC BLOOD PRESSURE: 142 MMHG | BODY MASS INDEX: 32.48 KG/M2 | HEART RATE: 72 BPM | WEIGHT: 232 LBS

## 2024-02-15 DIAGNOSIS — I25.5 ISCHEMIC CARDIOMYOPATHY: ICD-10-CM

## 2024-02-15 DIAGNOSIS — M10.9 GOUT, UNSPECIFIED: ICD-10-CM

## 2024-02-15 PROCEDURE — 99213 OFFICE O/P EST LOW 20 MIN: CPT | Mod: 25

## 2024-02-15 PROCEDURE — G0404: CPT

## 2024-02-15 PROCEDURE — 93000 ELECTROCARDIOGRAM COMPLETE: CPT

## 2024-02-15 PROCEDURE — G2211 COMPLEX E/M VISIT ADD ON: CPT | Mod: NC,1L

## 2024-02-15 RX ORDER — CLOPIDOGREL BISULFATE 75 MG/1
75 TABLET, FILM COATED ORAL
Qty: 90 | Refills: 3 | Status: DISCONTINUED | COMMUNITY
Start: 2023-09-13 | End: 2024-02-15

## 2024-04-10 RX ORDER — CLOPIDOGREL BISULFATE 75 MG/1
75 TABLET, FILM COATED ORAL WEEKLY
Refills: 0 | Status: DISCONTINUED | COMMUNITY

## 2024-04-10 RX ORDER — CLOPIDOGREL BISULFATE 75 MG/1
75 TABLET, FILM COATED ORAL
Refills: 0 | Status: DISCONTINUED
End: 2024-04-10

## 2024-04-10 NOTE — REASON FOR VISIT
[Follow-Up - Clinic] : a clinic follow-up of [Cardiomyopathy] : cardiomyopathy [Coronary Artery Disease] : coronary artery disease [Hyperlipidemia] : hyperlipidemia [Hypertension] : hypertension [FreeTextEntry1] : I saw this obese 36-year-old man in follow-up consultation on 01/27/22\par  08/20  he presented to the hospital with chest pain and congestive heart failure, was diuresed and underwent cardiac cath revealing triple-vessel disease, got a balloon pump, was transferred to [facility], and had a quadruple bypass procedure.\par  He seems to have recovered well, with some tingling of his fingers from a ulnar nerve injury, has a bedsore on his back, and has discomfort in his left calf from where the vein was removed.\par  He comes in today for routine follow-up and complains of constant excruciating pain of his left ankle and foot which seems warm to the touch and may be gout. Blood test showed a uric acid of > 9.0\par  Recent echo shows very good improvement of LV function to 55%\par  He is asymptomatic and comes in for routine follow-up and will schedule bariatric surgery to try and reduce his weight\par  \par

## 2024-04-10 NOTE — HISTORY OF PRESENT ILLNESS
[Preoperative Visit] : for a medical evaluation prior to surgery [Date of Surgery ___] : on [unfilled] [Good] : Good [Poor Exercise Tolerance] : poor exercise tolerance [Cardiovascular Disease] : cardiovascular disease [Anti-Platelet Agents] : anti-platelet agents [Frequent Aspirin Use] : frequent aspirin use [Prior Anesthesia] : Prior anesthesia [Electrocardiogram] : ~T an ECG ~C was performed [Echocardiogram] : ~T an echocardiogram ~C was performed [Cardiac Catheterization  (Diagnostic)] : cardiac catheterization ~T ~C was performed [Fever] : no fever [Chills] : no chills [Fatigue] : no fatigue [Chest Pain] : no chest pain [Cough] : no cough [Dyspnea] : no dyspnea [Dysuria] : no dysuria [Urinary Frequency] : no urinary frequency [Nausea] : no nausea [Vomiting] : no vomiting [Diarrhea] : no diarrhea [Abdominal Pain] : no abdominal pain [Easy Bruising] : no easy bruising [Lower Extremity Swelling] : no lower extremity swelling [Diabetes] : no diabetes [Pulmonary Disease] : no pulmonary disease [Nicotine Dependence] : no nicotine dependence [Alcohol Use] : no  alcohol use [Renal Disease] : no renal disease [GI Disease] : no gastrointestinal disease [Sleep Apnea] : no sleep apnea [Thromboembolic Problems] : no thromboembolic problems [Frequent use of NSAIDs] : no use of NSAIDs [Transfusion Reaction] : no transfusion reaction [Impaired Immunity] : no impaired immunity [Steroid Use in Last 6 Months] : no steroid use in the last six months [Prev Anesthesia Reaction] : no previous anesthesia reaction [Anesthesia Reaction] : no anesthesia reaction [Sudden Death] : no sudden death [Clotting Disorder] : no clotting disorder [Bleeding Disorder] : no bleeding disorder [FreeTextEntry1] : he has no chest pain\par  He has no shortness of breath\par  He has no palpitations\par  He has no syncope\par  He is neurologically intact\par  He has no edema\par  He has no GI symptoms\par

## 2024-04-10 NOTE — DISCUSSION/SUMMARY
[Procedure Low Risk] : the procedure risk is low [Patient Low Risk] : the patient is a low surgical risk [Optimized for Surgery] : the patient is optimized for surgery [As per surgery] : as per surgery [Continue] : Continue medications as currently directed [FreeTextEntry1] : He has lost 130 pounds since the gastric sleeve surgery I increased his losartan to 50 mg daily for his blood pressure He is cleared to undergo hand surgery (04/10/24) He can stop the aspirin 81 mg daily  3 days before.  He can stop the Plavix a week before the surgery.

## 2024-04-18 ENCOUNTER — APPOINTMENT (OUTPATIENT)
Dept: CARDIOLOGY | Facility: CLINIC | Age: 39
End: 2024-04-18
Payer: COMMERCIAL

## 2024-04-18 VITALS
WEIGHT: 235 LBS | DIASTOLIC BLOOD PRESSURE: 98 MMHG | OXYGEN SATURATION: 99 % | HEART RATE: 66 BPM | BODY MASS INDEX: 32.9 KG/M2 | HEIGHT: 71 IN | SYSTOLIC BLOOD PRESSURE: 152 MMHG

## 2024-04-18 DIAGNOSIS — Z95.1 PRESENCE OF AORTOCORONARY BYPASS GRAFT: ICD-10-CM

## 2024-04-18 DIAGNOSIS — I25.10 ATHEROSCLEROTIC HEART DISEASE OF NATIVE CORONARY ARTERY W/OUT ANGINA PECTORIS: ICD-10-CM

## 2024-04-18 DIAGNOSIS — E78.5 HYPERLIPIDEMIA, UNSPECIFIED: ICD-10-CM

## 2024-04-18 PROCEDURE — G2211 COMPLEX E/M VISIT ADD ON: CPT

## 2024-04-18 PROCEDURE — 99214 OFFICE O/P EST MOD 30 MIN: CPT
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
## 2024-05-08 NOTE — DISCUSSION/SUMMARY
[FreeTextEntry1] : I reassured him that I did not think the MRI findings had anything to do with his headache.. Recent lipid profile is excellent He will follow-up with me in 6 months. 5/08/24 Cleared for Carpal Tunnel surgery

## 2024-08-14 ENCOUNTER — APPOINTMENT (OUTPATIENT)
Dept: FAMILY MEDICINE | Facility: CLINIC | Age: 39
End: 2024-08-14

## 2024-08-15 ENCOUNTER — NON-APPOINTMENT (OUTPATIENT)
Age: 39
End: 2024-08-15

## 2024-08-15 ENCOUNTER — APPOINTMENT (OUTPATIENT)
Dept: CARDIOLOGY | Facility: CLINIC | Age: 39
End: 2024-08-15
Payer: COMMERCIAL

## 2024-08-15 VITALS
SYSTOLIC BLOOD PRESSURE: 126 MMHG | HEART RATE: 84 BPM | OXYGEN SATURATION: 98 % | BODY MASS INDEX: 32.64 KG/M2 | WEIGHT: 228 LBS | HEIGHT: 70 IN | DIASTOLIC BLOOD PRESSURE: 82 MMHG

## 2024-08-15 DIAGNOSIS — I25.10 ATHEROSCLEROTIC HEART DISEASE OF NATIVE CORONARY ARTERY W/OUT ANGINA PECTORIS: ICD-10-CM

## 2024-08-15 PROCEDURE — G0404: CPT

## 2024-08-15 PROCEDURE — 93000 ELECTROCARDIOGRAM COMPLETE: CPT

## 2024-08-15 PROCEDURE — 99215 OFFICE O/P EST HI 40 MIN: CPT | Mod: 25

## 2024-08-15 PROCEDURE — G2211 COMPLEX E/M VISIT ADD ON: CPT | Mod: NC

## 2024-08-15 NOTE — DISCUSSION/SUMMARY
[Procedure Low Risk] : the procedure risk is low [Patient Low Risk] : the patient is a low surgical risk [As per surgery] : as per surgery [Continue] : Continue medications as currently directed [Additional Diagnostics Recommended] : additional diagnostics recommended [FreeTextEntry1] : He has lost 130 pounds since the gastric sleeve surgery I increased his losartan to 50 mg daily for his blood pressure Because of more prominent T wave inversion across the precordium I will get a nuclear stress test first.

## 2024-08-15 NOTE — REASON FOR VISIT
[Follow-Up - Clinic] : a clinic follow-up of [Cardiomyopathy] : cardiomyopathy [Coronary Artery Disease] : coronary artery disease [Hyperlipidemia] : hyperlipidemia [Hypertension] : hypertension [FreeTextEntry1] : I saw this obese 36-year-old man in follow-up consultation on 01/27/22\par  08/20  he presented to the hospital with chest pain and congestive heart failure, was diuresed and underwent cardiac cath revealing triple-vessel disease, got a balloon pump, was transferred to [facility], and had a quadruple bypass procedure.\par  He seems to have recovered well, with some tingling of his fingers from a ulnar nerve injury, has a bedsore on his back, and has discomfort in his left calf from where the vein was removed.\par  He comes in today for routine follow-up and complains of constant excruciating pain of his left ankle and foot which seems warm to the touch and may be gout. Blood test showed a uric acid of > 9.0\par  Recent echo shows very good improvement of LV function to 55%\par  He is asymptomatic and comes in for routine follow-up and will schedule bariatric surgery to try and reduce his weight\par  \par

## 2024-08-16 ENCOUNTER — APPOINTMENT (OUTPATIENT)
Dept: FAMILY MEDICINE | Facility: CLINIC | Age: 39
End: 2024-08-16
Payer: COMMERCIAL

## 2024-08-16 VITALS
DIASTOLIC BLOOD PRESSURE: 84 MMHG | HEIGHT: 70 IN | BODY MASS INDEX: 33 KG/M2 | SYSTOLIC BLOOD PRESSURE: 124 MMHG | TEMPERATURE: 98.3 F | RESPIRATION RATE: 16 BRPM | WEIGHT: 230.5 LBS | OXYGEN SATURATION: 98 % | HEART RATE: 86 BPM

## 2024-08-16 DIAGNOSIS — R91.1 SOLITARY PULMONARY NODULE: ICD-10-CM

## 2024-08-16 DIAGNOSIS — Z95.1 PRESENCE OF AORTOCORONARY BYPASS GRAFT: ICD-10-CM

## 2024-08-16 DIAGNOSIS — E78.5 HYPERLIPIDEMIA, UNSPECIFIED: ICD-10-CM

## 2024-08-16 DIAGNOSIS — E66.9 OBESITY, UNSPECIFIED: ICD-10-CM

## 2024-08-16 DIAGNOSIS — I25.5 ISCHEMIC CARDIOMYOPATHY: ICD-10-CM

## 2024-08-16 DIAGNOSIS — M10.9 GOUT, UNSPECIFIED: ICD-10-CM

## 2024-08-16 PROCEDURE — 99214 OFFICE O/P EST MOD 30 MIN: CPT

## 2024-08-16 NOTE — PLAN
[FreeTextEntry1] : 39-year-old gentleman presents to renew medication Hyperlipidemia-atorvastatin 80 mg daily Hypertension-124/84-controlled with losartan 100 mg daily and metoprolol 100 mg daily Metabolic syndrome-5 foot six 5 foot 10 inches 230 pound male diet and exercise are discussed as the cornerstone of treatment of the disease is metabolic syndrome

## 2024-08-16 NOTE — HEALTH RISK ASSESSMENT
[0] : 2) Feeling down, depressed, or hopeless: Not at all (0) [PHQ-2 Negative - No further assessment needed] : PHQ-2 Negative - No further assessment needed [[id number]] : 0

## 2024-09-11 ENCOUNTER — APPOINTMENT (OUTPATIENT)
Dept: CARDIOLOGY | Facility: CLINIC | Age: 39
End: 2024-09-11

## 2024-09-19 ENCOUNTER — APPOINTMENT (OUTPATIENT)
Dept: CARDIOLOGY | Facility: CLINIC | Age: 39
End: 2024-09-19

## 2024-10-23 ENCOUNTER — APPOINTMENT (OUTPATIENT)
Dept: CARDIOLOGY | Facility: CLINIC | Age: 39
End: 2024-10-23

## 2024-10-24 ENCOUNTER — APPOINTMENT (OUTPATIENT)
Dept: CARDIOLOGY | Facility: CLINIC | Age: 39
End: 2024-10-24

## 2024-10-25 ENCOUNTER — APPOINTMENT (OUTPATIENT)
Dept: FAMILY MEDICINE | Facility: CLINIC | Age: 39
End: 2024-10-25

## 2024-12-18 ENCOUNTER — NON-APPOINTMENT (OUTPATIENT)
Age: 39
End: 2024-12-18

## 2025-01-20 ENCOUNTER — APPOINTMENT (OUTPATIENT)
Dept: FAMILY MEDICINE | Facility: CLINIC | Age: 40
End: 2025-01-20
Payer: COMMERCIAL

## 2025-01-20 VITALS
HEIGHT: 70 IN | BODY MASS INDEX: 32.93 KG/M2 | WEIGHT: 230 LBS | SYSTOLIC BLOOD PRESSURE: 140 MMHG | TEMPERATURE: 79.8 F | DIASTOLIC BLOOD PRESSURE: 82 MMHG | RESPIRATION RATE: 12 BRPM | HEART RATE: 91 BPM | OXYGEN SATURATION: 98 %
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
## 2025-01-20 DIAGNOSIS — E78.5 HYPERLIPIDEMIA, UNSPECIFIED: ICD-10-CM

## 2025-01-20 DIAGNOSIS — M10.9 GOUT, UNSPECIFIED: ICD-10-CM

## 2025-01-20 DIAGNOSIS — G47.33 OBSTRUCTIVE SLEEP APNEA (ADULT) (PEDIATRIC): ICD-10-CM

## 2025-01-20 DIAGNOSIS — R79.89 OTHER SPECIFIED ABNORMAL FINDINGS OF BLOOD CHEMISTRY: ICD-10-CM

## 2025-01-20 DIAGNOSIS — R91.1 SOLITARY PULMONARY NODULE: ICD-10-CM

## 2025-01-20 DIAGNOSIS — E66.9 OBESITY, UNSPECIFIED: ICD-10-CM

## 2025-01-20 DIAGNOSIS — B35.4 TINEA CORPORIS: ICD-10-CM

## 2025-01-20 DIAGNOSIS — I25.10 ATHEROSCLEROTIC HEART DISEASE OF NATIVE CORONARY ARTERY W/OUT ANGINA PECTORIS: ICD-10-CM

## 2025-01-20 DIAGNOSIS — Z95.1 PRESENCE OF AORTOCORONARY BYPASS GRAFT: ICD-10-CM

## 2025-01-20 PROCEDURE — 99396 PREV VISIT EST AGE 40-64: CPT | Mod: 25

## 2025-01-20 RX ORDER — TERBINAFINE HYDROCHLORIDE 250 MG/1
250 TABLET ORAL DAILY
Qty: 30 | Refills: 0 | Status: ACTIVE | COMMUNITY
Start: 2025-01-20 | End: 1900-01-01

## 2025-01-26 LAB
ALBUMIN SERPL ELPH-MCNC: 4.8 G/DL
ALP BLD-CCNC: 76 U/L
ALT SERPL-CCNC: 13 U/L
ANAPLASMA PHAGOCYTOPHILIA IGG ANTIBODIES: NEGATIVE
ANAPLASMA PHAGOCYTOPHILIA IGM ANTIBODIES: NEGATIVE
ANION GAP SERPL CALC-SCNC: 16 MMOL/L
AST SERPL-CCNC: 24 U/L
B BURGDOR AB SER-IMP: NEGATIVE
B BURGDOR IGG+IGM SER QL: 0.29 INDEX
B MICROTI IGG TITR SER: NORMAL
BABESIA ANTIBODIES, IGM: NORMAL
BASOPHILS # BLD AUTO: 0.08 K/UL
BASOPHILS NFR BLD AUTO: 1.2 %
BILIRUB SERPL-MCNC: 0.2 MG/DL
BUN SERPL-MCNC: 10 MG/DL
CALCIUM SERPL-MCNC: 9.8 MG/DL
CHLORIDE SERPL-SCNC: 97 MMOL/L
CHOLEST SERPL-MCNC: 226 MG/DL
CO2 SERPL-SCNC: 23 MMOL/L
CREAT SERPL-MCNC: 0.79 MG/DL
EGFR: 115 ML/MIN/1.73M2
EHRLICIA CHAFFEENIS IGG ANTIBODIES: NEGATIVE
EHRLICIA CHAFFEENIS IGM ANTIBODIES: NEGATIVE
EOSINOPHIL # BLD AUTO: 0.17 K/UL
EOSINOPHIL NFR BLD AUTO: 2.5 %
ERHLICIA RESULT COMMENT: NORMAL
ESTIMATED AVERAGE GLUCOSE: 117 MG/DL
GLUCOSE SERPL-MCNC: 73 MG/DL
HBA1C MFR BLD HPLC: 5.7 %
HCT VFR BLD CALC: 49.8 %
HDLC SERPL-MCNC: 54 MG/DL
HGB BLD-MCNC: 16.1 G/DL
IMM GRANULOCYTES NFR BLD AUTO: 0.7 %
LDLC SERPL CALC-MCNC: 108 MG/DL
LYMPHOCYTES # BLD AUTO: 1.88 K/UL
LYMPHOCYTES NFR BLD AUTO: 27.9 %
MAN DIFF?: NORMAL
MCHC RBC-ENTMCNC: 28.9 PG
MCHC RBC-ENTMCNC: 32.3 G/DL
MCV RBC AUTO: 89.2 FL
MONOCYTES # BLD AUTO: 0.45 K/UL
MONOCYTES NFR BLD AUTO: 6.7 %
NEUTROPHILS # BLD AUTO: 4.12 K/UL
NEUTROPHILS NFR BLD AUTO: 61 %
NONHDLC SERPL-MCNC: 172 MG/DL
PLATELET # BLD AUTO: 400 K/UL
POTASSIUM SERPL-SCNC: 5.4 MMOL/L
PROT SERPL-MCNC: 8 G/DL
PSA SERPL-MCNC: 1.22 NG/ML
RBC # BLD: 5.58 M/UL
RBC # FLD: 14.6 %
SODIUM SERPL-SCNC: 136 MMOL/L
TRIGL SERPL-MCNC: 371 MG/DL
TSH SERPL-ACNC: 0.96 UIU/ML
WBC # FLD AUTO: 6.75 K/UL

## 2025-04-28 ENCOUNTER — RX RENEWAL (OUTPATIENT)
Age: 40
End: 2025-04-28